# Patient Record
Sex: FEMALE | Race: OTHER | Employment: OTHER | URBAN - METROPOLITAN AREA
[De-identification: names, ages, dates, MRNs, and addresses within clinical notes are randomized per-mention and may not be internally consistent; named-entity substitution may affect disease eponyms.]

---

## 2017-02-14 ENCOUNTER — TELEPHONE (OUTPATIENT)
Dept: OBGYN | Age: 62
End: 2017-02-14

## 2017-02-14 RX ORDER — FLUCONAZOLE 150 MG/1
150 TABLET ORAL ONCE
Qty: 2 TABLET | Refills: 0 | Status: SHIPPED | OUTPATIENT
Start: 2017-02-14 | End: 2017-02-14

## 2017-03-20 ENCOUNTER — OFFICE VISIT (OUTPATIENT)
Dept: OBGYN | Age: 62
End: 2017-03-20

## 2017-03-20 VITALS
HEIGHT: 67 IN | WEIGHT: 243 LBS | BODY MASS INDEX: 38.14 KG/M2 | SYSTOLIC BLOOD PRESSURE: 130 MMHG | DIASTOLIC BLOOD PRESSURE: 86 MMHG

## 2017-03-20 DIAGNOSIS — N76.0 ACUTE VAGINITIS: ICD-10-CM

## 2017-03-20 DIAGNOSIS — N89.8 VAGINAL DISCHARGE: Primary | ICD-10-CM

## 2017-03-20 PROCEDURE — 1036F TOBACCO NON-USER: CPT | Performed by: OBSTETRICS & GYNECOLOGY

## 2017-03-20 PROCEDURE — G8484 FLU IMMUNIZE NO ADMIN: HCPCS | Performed by: OBSTETRICS & GYNECOLOGY

## 2017-03-20 PROCEDURE — 3014F SCREEN MAMMO DOC REV: CPT | Performed by: OBSTETRICS & GYNECOLOGY

## 2017-03-20 PROCEDURE — G8427 DOCREV CUR MEDS BY ELIG CLIN: HCPCS | Performed by: OBSTETRICS & GYNECOLOGY

## 2017-03-20 PROCEDURE — 3017F COLORECTAL CA SCREEN DOC REV: CPT | Performed by: OBSTETRICS & GYNECOLOGY

## 2017-03-20 PROCEDURE — G8417 CALC BMI ABV UP PARAM F/U: HCPCS | Performed by: OBSTETRICS & GYNECOLOGY

## 2017-03-20 PROCEDURE — 99214 OFFICE O/P EST MOD 30 MIN: CPT | Performed by: OBSTETRICS & GYNECOLOGY

## 2017-03-20 RX ORDER — NYSTATIN 100000 [USP'U]/G
POWDER TOPICAL
Qty: 60 G | Refills: 6 | Status: SHIPPED | OUTPATIENT
Start: 2017-03-20 | End: 2017-07-05

## 2017-03-20 RX ORDER — METFORMIN HYDROCHLORIDE 500 MG/1
TABLET, EXTENDED RELEASE ORAL
Refills: 6 | Status: ON HOLD | COMMUNITY
Start: 2017-02-28 | End: 2021-08-19

## 2017-04-17 ENCOUNTER — TELEPHONE (OUTPATIENT)
Dept: OBGYN | Age: 62
End: 2017-04-17

## 2017-04-21 ENCOUNTER — TELEPHONE (OUTPATIENT)
Dept: OBGYN | Age: 62
End: 2017-04-21

## 2017-04-24 ENCOUNTER — TELEPHONE (OUTPATIENT)
Dept: OBGYN | Age: 62
End: 2017-04-24

## 2017-04-24 RX ORDER — CLOBETASOL PROPIONATE 0.5 MG/G
OINTMENT TOPICAL
Qty: 1 TUBE | Refills: 2 | Status: SHIPPED | OUTPATIENT
Start: 2017-04-24 | End: 2017-07-17 | Stop reason: SDUPTHER

## 2017-04-28 ENCOUNTER — TELEPHONE (OUTPATIENT)
Dept: OBGYN | Age: 62
End: 2017-04-28

## 2017-07-05 ENCOUNTER — OFFICE VISIT (OUTPATIENT)
Dept: OBGYN | Age: 62
End: 2017-07-05

## 2017-07-05 VITALS
DIASTOLIC BLOOD PRESSURE: 80 MMHG | SYSTOLIC BLOOD PRESSURE: 132 MMHG | WEIGHT: 240 LBS | BODY MASS INDEX: 37.67 KG/M2 | HEIGHT: 67 IN

## 2017-07-05 DIAGNOSIS — R21 RASH/SKIN ERUPTION: Primary | ICD-10-CM

## 2017-07-05 DIAGNOSIS — B37.2 YEAST INFECTION OF THE SKIN: ICD-10-CM

## 2017-07-05 PROCEDURE — 99214 OFFICE O/P EST MOD 30 MIN: CPT | Performed by: OBSTETRICS & GYNECOLOGY

## 2017-07-05 PROCEDURE — G8417 CALC BMI ABV UP PARAM F/U: HCPCS | Performed by: OBSTETRICS & GYNECOLOGY

## 2017-07-05 PROCEDURE — 1036F TOBACCO NON-USER: CPT | Performed by: OBSTETRICS & GYNECOLOGY

## 2017-07-05 PROCEDURE — 3017F COLORECTAL CA SCREEN DOC REV: CPT | Performed by: OBSTETRICS & GYNECOLOGY

## 2017-07-05 PROCEDURE — G8427 DOCREV CUR MEDS BY ELIG CLIN: HCPCS | Performed by: OBSTETRICS & GYNECOLOGY

## 2017-07-05 PROCEDURE — 3014F SCREEN MAMMO DOC REV: CPT | Performed by: OBSTETRICS & GYNECOLOGY

## 2017-07-17 RX ORDER — CLOBETASOL PROPIONATE 0.5 MG/G
OINTMENT TOPICAL
Qty: 60 G | Refills: 6 | Status: ON HOLD | OUTPATIENT
Start: 2017-07-17 | End: 2021-08-19

## 2017-11-27 ENCOUNTER — TELEPHONE (OUTPATIENT)
Dept: OBGYN | Age: 62
End: 2017-11-27

## 2017-11-27 DIAGNOSIS — Z12.39 BREAST CANCER SCREENING: Primary | ICD-10-CM

## 2017-12-01 ENCOUNTER — HOSPITAL ENCOUNTER (OUTPATIENT)
Dept: WOMENS IMAGING | Age: 62
Discharge: HOME | End: 2017-12-01
Payer: COMMERCIAL

## 2017-12-01 DIAGNOSIS — Z12.39 BREAST CANCER SCREENING: ICD-10-CM

## 2017-12-01 PROCEDURE — G0202 SCR MAMMO BI INCL CAD: HCPCS

## 2018-08-15 ENCOUNTER — TELEPHONE (OUTPATIENT)
Dept: OBGYN CLINIC | Age: 63
End: 2018-08-15

## 2018-08-15 DIAGNOSIS — Z12.39 BREAST CANCER SCREENING: Primary | ICD-10-CM

## 2019-05-22 ENCOUNTER — TELEPHONE (OUTPATIENT)
Dept: OBGYN CLINIC | Age: 64
End: 2019-05-22

## 2019-05-22 DIAGNOSIS — Z12.39 BREAST CANCER SCREENING: Primary | ICD-10-CM

## 2019-05-22 NOTE — TELEPHONE ENCOUNTER
Pt is asking for a mammogram order to be put in her chart  She said she is 70 and was told she does not need paps any longer

## 2019-06-05 ENCOUNTER — HOSPITAL ENCOUNTER (OUTPATIENT)
Dept: WOMENS IMAGING | Age: 64
Discharge: HOME | End: 2019-06-07
Payer: COMMERCIAL

## 2019-06-05 DIAGNOSIS — Z12.39 BREAST CANCER SCREENING: ICD-10-CM

## 2019-06-05 PROCEDURE — 77067 SCR MAMMO BI INCL CAD: CPT

## 2021-03-18 DIAGNOSIS — Z01.818 PRE-OP TESTING: Primary | ICD-10-CM

## 2021-03-19 ENCOUNTER — HOSPITAL ENCOUNTER (OUTPATIENT)
Dept: WOMENS IMAGING | Age: 66
Discharge: HOME | End: 2021-03-21
Payer: MEDICARE

## 2021-03-19 VITALS — BODY MASS INDEX: 37.59 KG/M2 | HEIGHT: 67 IN

## 2021-03-19 DIAGNOSIS — Z12.31 ENCOUNTER FOR SCREENING MAMMOGRAM FOR BREAST CANCER: ICD-10-CM

## 2021-03-19 DIAGNOSIS — Z00.00 ROUTINE GENERAL MEDICAL EXAMINATION AT A HEALTH CARE FACILITY: ICD-10-CM

## 2021-03-19 PROCEDURE — 77067 SCR MAMMO BI INCL CAD: CPT

## 2021-03-31 ENCOUNTER — HOSPITAL ENCOUNTER (OUTPATIENT)
Dept: ULTRASOUND IMAGING | Age: 66
Discharge: HOME | End: 2021-04-02
Payer: MEDICARE

## 2021-03-31 ENCOUNTER — HOSPITAL ENCOUNTER (OUTPATIENT)
Dept: WOMENS IMAGING | Age: 66
Discharge: HOME | End: 2021-04-02
Payer: MEDICARE

## 2021-03-31 DIAGNOSIS — R92.8 ABNORMAL MAMMOGRAM OF BOTH BREASTS: ICD-10-CM

## 2021-03-31 PROCEDURE — G0279 TOMOSYNTHESIS, MAMMO: HCPCS

## 2021-03-31 PROCEDURE — 76642 ULTRASOUND BREAST LIMITED: CPT

## 2021-08-02 RX ORDER — POLYETHYLENE GLYCOL 3350, SODIUM CHLORIDE, SODIUM BICARBONATE, POTASSIUM CHLORIDE 420; 11.2; 5.72; 1.48 G/4L; G/4L; G/4L; G/4L
4000 POWDER, FOR SOLUTION ORAL ONCE
Qty: 4000 ML | Refills: 0 | Status: SHIPPED | OUTPATIENT
Start: 2021-08-02 | End: 2021-08-02

## 2021-08-13 ENCOUNTER — NURSE ONLY (OUTPATIENT)
Dept: GASTROENTEROLOGY | Age: 66
End: 2021-08-13

## 2021-08-13 DIAGNOSIS — Z01.818 PREOP TESTING: Primary | ICD-10-CM

## 2021-08-13 NOTE — PROGRESS NOTES
2021    Criselda Fuller (:  1955) is a 77 y.o. female, here requesting COVID-19 testing    History of Present Illness  none    There were no vitals filed for this visit. ASSESSMENT  Screening for COVID-19/ Viral disease    PLAN  POCT influenza testing Not Tested Here for preop testing for gastro procedure   COVID-19 sample collected and submitted  Patient given detailed CDC instructions contained within After Visit Summary       An  electronic signature was used to authenticate this note.     --Svetlana Norman on 2021 at 10:14 AM

## 2021-08-19 ENCOUNTER — ANCILLARY PROCEDURE (OUTPATIENT)
Dept: ENDOSCOPY | Age: 66
End: 2021-08-19
Attending: SPECIALIST
Payer: MEDICARE

## 2021-08-19 ENCOUNTER — HOSPITAL ENCOUNTER (OUTPATIENT)
Age: 66
Setting detail: OUTPATIENT SURGERY
Discharge: HOME | End: 2021-08-19
Attending: SPECIALIST | Admitting: SPECIALIST
Payer: MEDICARE

## 2021-08-19 ENCOUNTER — ANESTHESIA (OUTPATIENT)
Dept: ENDOSCOPY | Age: 66
End: 2021-08-19
Payer: MEDICARE

## 2021-08-19 ENCOUNTER — ANESTHESIA EVENT (OUTPATIENT)
Dept: ENDOSCOPY | Age: 66
End: 2021-08-19
Payer: MEDICARE

## 2021-08-19 VITALS
RESPIRATION RATE: 16 BRPM | HEIGHT: 67 IN | TEMPERATURE: 97.5 F | DIASTOLIC BLOOD PRESSURE: 59 MMHG | BODY MASS INDEX: 37.35 KG/M2 | SYSTOLIC BLOOD PRESSURE: 123 MMHG | HEART RATE: 69 BPM | WEIGHT: 238 LBS | OXYGEN SATURATION: 98 %

## 2021-08-19 VITALS
SYSTOLIC BLOOD PRESSURE: 108 MMHG | RESPIRATION RATE: 9 BRPM | DIASTOLIC BLOOD PRESSURE: 54 MMHG | OXYGEN SATURATION: 100 %

## 2021-08-19 LAB
GLUCOSE BLD-MCNC: 135 MG/DL (ref 60–115)
PERFORMED ON: ABNORMAL

## 2021-08-19 PROCEDURE — 2580000003 HC RX 258: Performed by: NURSE ANESTHETIST, CERTIFIED REGISTERED

## 2021-08-19 PROCEDURE — 3609027000 HC COLONOSCOPY: Performed by: SPECIALIST

## 2021-08-19 PROCEDURE — 2709999900 HC NON-CHARGEABLE SUPPLY: Performed by: SPECIALIST

## 2021-08-19 PROCEDURE — 6370000000 HC RX 637 (ALT 250 FOR IP): Performed by: SPECIALIST

## 2021-08-19 PROCEDURE — 45385 COLONOSCOPY W/LESION REMOVAL: CPT | Performed by: SPECIALIST

## 2021-08-19 PROCEDURE — 7100000010 HC PHASE II RECOVERY - FIRST 15 MIN: Performed by: SPECIALIST

## 2021-08-19 PROCEDURE — 2580000003 HC RX 258: Performed by: SPECIALIST

## 2021-08-19 PROCEDURE — 45380 COLONOSCOPY AND BIOPSY: CPT | Performed by: SPECIALIST

## 2021-08-19 PROCEDURE — 3700000000 HC ANESTHESIA ATTENDED CARE: Performed by: SPECIALIST

## 2021-08-19 PROCEDURE — 3700000001 HC ADD 15 MINUTES (ANESTHESIA): Performed by: SPECIALIST

## 2021-08-19 PROCEDURE — 88305 TISSUE EXAM BY PATHOLOGIST: CPT

## 2021-08-19 PROCEDURE — 2500000003 HC RX 250 WO HCPCS: Performed by: NURSE ANESTHETIST, CERTIFIED REGISTERED

## 2021-08-19 RX ORDER — MAGNESIUM HYDROXIDE 1200 MG/15ML
LIQUID ORAL PRN
Status: DISCONTINUED | OUTPATIENT
Start: 2021-08-19 | End: 2021-08-19 | Stop reason: ALTCHOICE

## 2021-08-19 RX ORDER — UBIDECARENONE 75 MG
50 CAPSULE ORAL DAILY
COMMUNITY

## 2021-08-19 RX ORDER — PROPOFOL 10 MG/ML
INJECTION, EMULSION INTRAVENOUS CONTINUOUS PRN
Status: DISCONTINUED | OUTPATIENT
Start: 2021-08-19 | End: 2021-08-19 | Stop reason: SDUPTHER

## 2021-08-19 RX ORDER — SODIUM CHLORIDE 9 MG/ML
INJECTION, SOLUTION INTRAVENOUS
Status: COMPLETED
Start: 2021-08-19 | End: 2021-08-19

## 2021-08-19 RX ORDER — SODIUM CHLORIDE 9 MG/ML
INJECTION, SOLUTION INTRAVENOUS CONTINUOUS PRN
Status: DISCONTINUED | OUTPATIENT
Start: 2021-08-19 | End: 2021-08-19 | Stop reason: SDUPTHER

## 2021-08-19 RX ORDER — ONDANSETRON 2 MG/ML
4 INJECTION INTRAMUSCULAR; INTRAVENOUS
Status: DISCONTINUED | OUTPATIENT
Start: 2021-08-19 | End: 2021-08-19 | Stop reason: HOSPADM

## 2021-08-19 RX ORDER — 0.9 % SODIUM CHLORIDE 0.9 %
500 INTRAVENOUS SOLUTION INTRAVENOUS ONCE
Status: COMPLETED | OUTPATIENT
Start: 2021-08-19 | End: 2021-08-19

## 2021-08-19 RX ORDER — LIDOCAINE HYDROCHLORIDE 20 MG/ML
INJECTION, SOLUTION INFILTRATION; PERINEURAL PRN
Status: DISCONTINUED | OUTPATIENT
Start: 2021-08-19 | End: 2021-08-19 | Stop reason: SDUPTHER

## 2021-08-19 RX ADMIN — PROPOFOL 140 MCG/KG/MIN: 10 INJECTION, EMULSION INTRAVENOUS at 11:38

## 2021-08-19 RX ADMIN — SODIUM CHLORIDE 500 ML: 9 INJECTION, SOLUTION INTRAVENOUS at 09:29

## 2021-08-19 RX ADMIN — SODIUM CHLORIDE: 9 INJECTION, SOLUTION INTRAVENOUS at 11:31

## 2021-08-19 RX ADMIN — LIDOCAINE HYDROCHLORIDE 40 MG: 20 INJECTION, SOLUTION INFILTRATION; PERINEURAL at 11:38

## 2021-08-19 ASSESSMENT — PAIN SCALES - GENERAL
PAINLEVEL_OUTOF10: 0
PAINLEVEL_OUTOF10: 0

## 2021-08-19 NOTE — H&P
Patient Name: Criselda Fuller  : 1955  MRN: [id number]  DATE: 21      ENDOSCOPY  History and Physical    Procedure:    [x] Diagnostic Colonoscopy       [] Screening Colonoscopy  [] EGD      [] ERCP      [] EUS       [] Other    [x] Previous office notes/History and Physical reviewed from the patients chart. Please see EMR for further details of HPI. I have examined the patient's status immediately prior to the procedure and:      Indications/HPI:    []Abdominal Pain  []Cancer- GI/Lung  []Fhx of colon CA/polyps  []History of Polyps  []Hernandezs   []Melena  []Abnormal Imaging  []Dysphagia    []Persistent Pneumonia  []Anemia  []Food Impaction  [x]History of Polyps  []GI Bleed  []Pulmonary nodule/Mass  []Change in bowel habits []Heartburn/Reflux  []Rectal Bleed (BRBPR)  []Chest Pain - Non Cardiac []Heme (+) Stoo  l[]Ulcers  []Constipation  []Hemoptysis   []Varices  []Diarrhea  []Hypoxemia  []Nausea/Vomiting  []Screening   []Crohns/Colitis  []Other:     Anesthesia:   [x] MAC [] Moderate Sedation   [] General   [] None     ROS: 12 pt Review of Symptoms was negative unless mentioned above    Medications:   Prior to Admission medications    Medication Sig Start Date End Date Taking? Authorizing Provider   vitamin B-12 (CYANOCOBALAMIN) 100 MCG tablet Take 50 mcg by mouth daily   Yes Historical Provider, MD   vitamin D (CHOLECALCIFEROL) 1000 UNIT TABS tablet Take 1,000 Int'l Units by mouth daily. Yes Historical Provider, MD       Allergies:    Allergies   Allergen Reactions    Amoxicillin     Amoxicillin-Pot Clavulanate     Aspirin     Cephalexin     Codeine     Darvocet [Propoxyphene N-Acetaminophen]     Helidac [Metronid-Tetracyc-Bis Subsal]     Meclizine     Metoclopramide     Nizatidine     Trimox [Amoxicillin Trihydrate]     Zithromax [Azithromycin Dihydrate]         History of allergic reaction to anesthesia:  No    Past Medical History:  Past Medical History:   Diagnosis Date    [facility] (acromioclavicular) joint bone spurs     spine, R ankle    Arthritis     Bulging lumbar disc     Colon polyp     Diabetes mellitus (HCC)     Disc degeneration, lumbar     DJD (degenerative joint disease)     knees    Esophageal polyp 2011    Fibromyalgia     Megaloblastic anemia due to decreased intake of vitamin B12     Ovarian cyst, right     Tendonitis     R ankle    Vitamin D deficiency        Past Surgical History:  Past Surgical History:   Procedure Laterality Date    ABSCESS DRAINAGE      rectum    COLONOSCOPY  8/1/14,8/09    polypectomy tracy    DILATION AND CURETTAGE      EYE SURGERY      EYE SURGERY      FULGURATION MINOR BLADDER LESION (W OR W/O BIOPSY)      FULGURATION MINOR BLADDER LESION (W OR W/O BIOPSY)      UPPER GASTROINTESTINAL ENDOSCOPY  11/11,    bx polypectomy geovannysmick       Social History:  Social History     Tobacco Use    Smoking status: Former Smoker     Types: Cigarettes    Smokeless tobacco: Never Used   Vaping Use    Vaping Use: Never used   Substance Use Topics    Alcohol use: No     Alcohol/week: 0.0 standard drinks    Drug use: No       Vital Signs:   Vitals:    08/19/21 0928   BP: (!) 203/89   Pulse: 79   Resp: 18   Temp: 97.5 °F (36.4 °C)   SpO2: 99%        Physical Exam:  Cardiac:  [x]WNL  []Comments:  Pulmonary:  [x]WNL   []Comments:   Neuro/Mental Status:  [x]WNL  []Comments:  Abdominal:  [x]WNL    []Comments:  Other:   []WNL  []Comments:    Informed Consent:  The risks and benefits of the procedure have been discussed with either the patient or if they cannot consent, their representative. Assessment:  Patient examined and appropriate for planned sedation and procedure. Plan:  Proceed with planned sedation and procedure as above.     Vonda Randle MD  11:31 AM

## 2021-08-19 NOTE — ANESTHESIA POSTPROCEDURE EVALUATION
Department of Anesthesiology  Postprocedure Note    Patient: Yvonne Rhodes  MRN: [id number]  YOB: 1955  Date of evaluation: 8/19/2021  Time:  12:06 PM     Procedure Summary     Date: 08/19/21 Room / Location: [facility] OR 01 / [facility]    Anesthesia Start: 1134 Anesthesia Stop:     Procedure: COLORECTAL CANCER SCREENING, HIGH RISK (N/A ) Diagnosis: (History of colon polyps Z86.010)    Surgeons: Mona Vigil MD Responsible Provider: ALEX Dickinson CRNA    Anesthesia Type: MAC ASA Status: 2          Anesthesia Type: No value filed. Vanessa Phase I: Vanessa Score: 10    Vanessa Phase II:      Last vitals: Reviewed and per EMR flowsheets.        Anesthesia Post Evaluation    Patient location during evaluation: PACU  Patient participation: complete - patient participated  Level of consciousness: awake and alert  Pain score: 0  Airway patency: patent  Nausea & Vomiting: no nausea and no vomiting  Complications: no  Cardiovascular status: blood pressure returned to baseline and hemodynamically stable  Respiratory status: acceptable  Hydration status: euvolemic

## 2021-08-19 NOTE — ANESTHESIA PRE PROCEDURE
MINOR BLADDER LESION (W OR W/O BIOPSY)      UPPER GASTROINTESTINAL ENDOSCOPY  11/11,    bx polypectomy tracy       Social History:    Social History     Tobacco Use    Smoking status: Former Smoker     Types: Cigarettes    Smokeless tobacco: Never Used   Substance Use Topics    Alcohol use: No     Alcohol/week: 0.0 standard drinks                                Counseling given: Not Answered      Vital Signs (Current):   Vitals:    08/19/21 0928   BP: (!) 203/89   Pulse: 79   Resp: 18   Temp: 36.4 °C (97.5 °F)   TempSrc: Temporal   SpO2: 99%   Weight: 238 lb (108 kg)   Height: 5' 7\" (1.702 m)                                              BP Readings from Last 3 Encounters:   08/19/21 (!) 203/89   07/05/17 132/80   03/20/17 130/86       NPO Status: Time of last liquid consumption: 0515                        Time of last solid consumption: 1700                        Date of last liquid consumption: 08/19/21                        Date of last solid food consumption: 08/17/21    BMI:   Wt Readings from Last 3 Encounters:   08/19/21 238 lb (108 kg)   07/05/17 240 lb (108.9 kg)   03/20/17 243 lb (110.2 kg)     Body mass index is 37.28 kg/m².     CBC: No results found for: WBC, RBC, HGB, HCT, MCV, RDW, PLT    CMP: No results found for: NA, K, CL, CO2, BUN, CREATININE, GFRAA, AGRATIO, LABGLOM, GLUCOSE, PROT, CALCIUM, BILITOT, ALKPHOS, AST, ALT    POC Tests:   Recent Labs     08/19/21  0932   POCGLU 135*       Coags: No results found for: PROTIME, INR, APTT    HCG (If Applicable): No results found for: PREGTESTUR, PREGSERUM, HCG, HCGQUANT     ABGs: No results found for: PHART, PO2ART, SJW5QDS, AMV7ZDB, BEART, L1FJSCEV     Type & Screen (If Applicable):  No results found for: LABABO, LABRH    Drug/Infectious Status (If Applicable):  No results found for: HIV, HEPCAB    COVID-19 Screening (If Applicable):   Lab Results   Component Value Date    COVID19 Not Detected 08/13/2021           Anesthesia Evaluation  Patient summary reviewed and Nursing notes reviewed  Airway: Mallampati: II  TM distance: >3 FB   Neck ROM: full  Mouth opening: > = 3 FB Dental: normal exam         Pulmonary:Negative Pulmonary ROS and normal exam                               Cardiovascular:Negative CV ROS                      Neuro/Psych:   (+) neuromuscular disease:,             GI/Hepatic/Renal: Neg GI/Hepatic/Renal ROS            Endo/Other:    (+) Diabetes, . Abdominal:             Vascular: Other Findings:             Anesthesia Plan      MAC     ASA 2             Anesthetic plan and risks discussed with patient. Plan discussed with CRNA.                   ALEX Dougherty - CRNA   8/19/2021

## 2022-08-11 ENCOUNTER — HOSPITAL ENCOUNTER (OUTPATIENT)
Dept: ULTRASOUND IMAGING | Age: 67
Discharge: HOME | End: 2022-08-13
Payer: MEDICARE

## 2022-08-11 ENCOUNTER — HOSPITAL ENCOUNTER (OUTPATIENT)
Dept: WOMENS IMAGING | Age: 67
Discharge: HOME | End: 2022-08-13
Payer: MEDICARE

## 2022-08-11 VITALS — HEIGHT: 67 IN | BODY MASS INDEX: 37.28 KG/M2

## 2022-08-11 DIAGNOSIS — R92.8 ABNORMAL MAMMOGRAM: ICD-10-CM

## 2022-08-11 PROCEDURE — 76642 ULTRASOUND BREAST LIMITED: CPT

## 2022-08-11 PROCEDURE — G0279 TOMOSYNTHESIS, MAMMO: HCPCS

## 2023-02-03 ENCOUNTER — HOSPITAL ENCOUNTER (OUTPATIENT)
Dept: WOMENS IMAGING | Age: 68
End: 2023-02-03
Payer: MEDICARE

## 2023-02-03 ENCOUNTER — HOSPITAL ENCOUNTER (OUTPATIENT)
Dept: ULTRASOUND IMAGING | Age: 68
End: 2023-02-03
Payer: MEDICARE

## 2023-02-03 VITALS — BODY MASS INDEX: 37.28 KG/M2 | HEIGHT: 67 IN

## 2023-02-03 DIAGNOSIS — R92.8 ABNORMAL MAMMOGRAM: ICD-10-CM

## 2023-02-03 PROCEDURE — 76642 ULTRASOUND BREAST LIMITED: CPT

## 2023-02-03 PROCEDURE — G0279 TOMOSYNTHESIS, MAMMO: HCPCS

## 2023-02-13 ENCOUNTER — OFFICE VISIT (OUTPATIENT)
Dept: SURGERY | Age: 68
End: 2023-02-13
Payer: MEDICARE

## 2023-02-13 VITALS
SYSTOLIC BLOOD PRESSURE: 148 MMHG | TEMPERATURE: 97 F | HEART RATE: 95 BPM | OXYGEN SATURATION: 99 % | DIASTOLIC BLOOD PRESSURE: 72 MMHG | WEIGHT: 244 LBS | RESPIRATION RATE: 16 BRPM | BODY MASS INDEX: 38.3 KG/M2 | HEIGHT: 67 IN

## 2023-02-13 DIAGNOSIS — E66.9 OBESITY, CLASS II, BMI 35-39.9: ICD-10-CM

## 2023-02-13 DIAGNOSIS — R92.8 ABNORMAL MAMMOGRAM OF RIGHT BREAST: Primary | ICD-10-CM

## 2023-02-13 DIAGNOSIS — N63.11 MASS OF UPPER OUTER QUADRANT OF RIGHT BREAST: ICD-10-CM

## 2023-02-13 PROBLEM — J30.9 ALLERGIC RHINITIS: Status: ACTIVE | Noted: 2023-02-13

## 2023-02-13 PROBLEM — R73.9 HYPERGLYCEMIA: Status: ACTIVE | Noted: 2023-02-13

## 2023-02-13 PROBLEM — B35.4 TINEA CORPORIS: Status: ACTIVE | Noted: 2023-02-13

## 2023-02-13 PROBLEM — K80.50 BILIARY COLIC: Status: ACTIVE | Noted: 2023-02-13

## 2023-02-13 PROBLEM — E53.8 COBALAMIN DEFICIENCY: Status: ACTIVE | Noted: 2023-02-13

## 2023-02-13 PROBLEM — M79.609 PAIN IN LIMB: Status: ACTIVE | Noted: 2017-03-03

## 2023-02-13 PROBLEM — E55.9 VITAMIN D DEFICIENCY: Status: ACTIVE | Noted: 2023-02-13

## 2023-02-13 PROBLEM — F34.1 PRIMARY DYSTHYMIA: Status: ACTIVE | Noted: 2023-02-13
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
## 2023-02-13 PROBLEM — S46.919A STRAIN OF SHOULDER: Status: ACTIVE | Noted: 2023-02-13

## 2023-02-13 PROBLEM — L20.9 ATOPIC DERMATITIS: Status: ACTIVE | Noted: 2023-02-13

## 2023-02-13 PROBLEM — S69.90XA INJURY OF WRIST: Status: ACTIVE | Noted: 2023-02-13

## 2023-02-13 PROBLEM — M77.30 CALCANEAL SPUR: Status: ACTIVE | Noted: 2017-03-03

## 2023-02-13 PROBLEM — M19.079 PRIMARY LOCALIZED OSTEOARTHROSIS OF ANKLE AND FOOT: Status: ACTIVE | Noted: 2017-03-03

## 2023-02-13 PROBLEM — E11.9 TYPE 2 DIABETES MELLITUS (HCC): Status: ACTIVE | Noted: 2023-02-13

## 2023-02-13 PROBLEM — M85.80 OSTEOPENIA: Status: ACTIVE | Noted: 2023-02-13

## 2023-02-13 PROBLEM — H25.813 COMBINED FORMS OF AGE-RELATED CATARACT OF BOTH EYES: Status: ACTIVE | Noted: 2021-05-12

## 2023-02-13 PROBLEM — E78.5 HYPERLIPIDEMIA: Status: ACTIVE | Noted: 2023-02-13

## 2023-02-13 PROBLEM — R53.1 WEAKNESS: Status: ACTIVE | Noted: 2023-02-13

## 2023-02-13 PROBLEM — E66.812 OBESITY, CLASS II, BMI 35-39.9: Status: ACTIVE | Noted: 2023-02-13

## 2023-02-13 PROBLEM — S00.83XA CONTUSION OF FACE: Status: ACTIVE | Noted: 2023-02-13

## 2023-02-13 PROCEDURE — 19083 BX BREAST 1ST LESION US IMAG: CPT | Performed by: SURGERY

## 2023-02-13 RX ORDER — COVID-19 ANTIGEN TEST
1 KIT MISCELLANEOUS EVERY 12 HOURS PRN
COMMUNITY
Start: 2021-07-22

## 2023-02-13 RX ORDER — PRAVASTATIN SODIUM 10 MG
1 TABLET ORAL DAILY
COMMUNITY
Start: 2022-12-02

## 2023-02-13 RX ORDER — CLOBETASOL PROPIONATE 0.5 MG/G
1 CREAM TOPICAL DAILY PRN
COMMUNITY
Start: 2019-10-28

## 2023-02-13 RX ORDER — MECLIZINE HCL 12.5 MG/1
25 TABLET ORAL 3 TIMES DAILY PRN
COMMUNITY

## 2023-02-13 RX ORDER — CETIRIZINE HYDROCHLORIDE 10 MG/1
1 TABLET ORAL DAILY PRN
COMMUNITY
Start: 2020-06-24

## 2023-02-13 RX ORDER — LIDOCAINE HYDROCHLORIDE AND EPINEPHRINE 15; 5 MG/ML; UG/ML
8 INJECTION, SOLUTION EPIDURAL ONCE
Status: COMPLETED | OUTPATIENT
Start: 2023-02-13 | End: 2023-02-13

## 2023-02-13 RX ADMIN — LIDOCAINE HYDROCHLORIDE AND EPINEPHRINE 8 ML: 15; 5 INJECTION, SOLUTION EPIDURAL at 10:52

## 2023-02-13 RX ADMIN — Medication 2 MEQ: at 10:52

## 2023-02-13 ASSESSMENT — ENCOUNTER SYMPTOMS
CHEST TIGHTNESS: 0
VOMITING: 0
SORE THROAT: 0
SHORTNESS OF BREATH: 0
ABDOMINAL PAIN: 0
COLOR CHANGE: 0
COUGH: 0
NAUSEA: 0

## 2023-02-13 NOTE — PROGRESS NOTES
NEW BREAST PATIENT         SERVICE DATE: 2/13/23  SERVICE TIME:  10:17 AM EST    REFERRED BY:  Reyes Salts, MD    REASON FOR TODAY'S VISIT:    Chief Complaint   Patient presents with    New Patient    Abnormal Mammogram    Abnormal Ultrasound     BIRADS 4 right breast,does self breast exams and cannot feel any changes, denies breast pain, nipple drainage or kin changes bilateral breasts      CHAPERONE WAS OFFERED, PATIENT RESPONDED: no    HISTORY AND CHIEF COMPLAINT:  Deya Fong is a 69 y.o.  female who is here for a New Patient, Abnormal Mammogram, and Abnormal Ultrasound (BIRADS 4 right breast,does self breast exams and cannot feel any changes, denies breast pain, nipple drainage or kin changes bilateral breasts)  She gets regular mammograms, and looking at this area for some time      BREAST HISTORY  Her past breast history (prior to this encounter) is as follows: Abnormal mammogram:   Yes, Right Breast BIRADS 4  Abnormal Breast US:  Yes, Right Breast BIRADS 4  Breast biopsy:    No  Breast cysts:    No  Breast surgery:    No  Breast cancer              No  History of Breastfeeding: No   Currently Breastfeeding: No      RISK FACTORS FOR BREAST CANCER:  Family History of Breast Cancer: Yes, significant for Maternal Grandmother age of DX unknown .   History of ovarian cancer: no  Ashkenazi Ancestry: no  Age at the birth of first child:  2 miscarriages  Age at the onset of menses: 13  Age at menopause: 52   Hormonal therapy: no  Postmenopausal obesity: yes, BMI 38.22    BRA SIZE: 48B    Past Medical History:   Diagnosis Date    AC (acromioclavicular) joint bone spurs     spine, R ankle    Adenomatous polyp of colon     Arthritis     Bulging lumbar disc     Colon polyp     Diabetes mellitus (HCC)     Disc degeneration, lumbar     DJD (degenerative joint disease)     knees    Esophageal polyp 2011    Fibromyalgia     Megaloblastic anemia due to decreased intake of vitamin B12     Ovarian cyst, right     Tendonitis     R ankle    Vitamin D deficiency      Past Surgical History:   Procedure Laterality Date    ABSCESS DRAINAGE      rectum    COLONOSCOPY  8/1/14,8/09    polypectomy tracy    COLONOSCOPY N/A 8/19/2021    COLORECTAL CANCER SCREENING, HIGH RISK with polypectomies  performed by Karyn Lopes MD at [address]       FULGUNORRIS MINOR BLADDER LESION (W OR W/O BIOPSY)      FULGURATION MINOR BLADDER LESION (W OR W/O BIOPSY)      UPPER GASTROINTESTINAL ENDOSCOPY  11/11,    bx polypectomy tracy     Family History   Problem Relation Age of Onset    Cancer Maternal Grandmother         uterine cancer, breast cancer, lung cancer    Breast Cancer Maternal Grandmother     Dementia Father      Social History     Socioeconomic History    Marital status:      Spouse name: Not on file    Number of children: Not on file    Years of education: Not on file    Highest education level: Not on file   Occupational History    Occupation: [employer]    Tobacco Use    Smoking status: Former     Types: Cigarettes    Smokeless tobacco: Never   Vaping Use    Vaping Use: Never used   Substance and Sexual Activity    Alcohol use: Not Currently    Drug use: No    Sexual activity: Not Currently   Other Topics Concern    Not on file   Social History Narrative    Not on file     Social Determinants of Health     Financial Resource Strain: Not on file   Food Insecurity: Not on file   Transportation Needs: Not on file   Physical Activity: Not on file   Stress: Not on file   Social Connections: Not on file   Intimate Partner Violence: Not on file   Housing Stability: Not on file       Review of Systems   Constitutional:  Negative for activity change, appetite change, chills, diaphoresis, fatigue, fever and unexpected weight change. HENT:  Negative for congestion, ear pain, hearing loss, mouth sores, nosebleeds and sore throat.     Respiratory: Negative for cough, chest tightness and shortness of breath. Cardiovascular:  Negative for chest pain, palpitations and leg swelling. Gastrointestinal:  Negative for abdominal pain, nausea and vomiting. Endocrine: Negative for cold intolerance, heat intolerance, polydipsia, polyphagia and polyuria. Genitourinary:  Negative for difficulty urinating, menstrual problem and vaginal bleeding. Musculoskeletal:  Negative for neck pain and neck stiffness. Skin:  Negative for color change, pallor, rash and wound. Allergic/Immunologic: Negative for environmental allergies and immunocompromised state. Neurological:  Negative for weakness. Hematological:  Does not bruise/bleed easily. Psychiatric/Behavioral:  Negative for agitation, confusion, sleep disturbance and suicidal ideas. The patient is not nervous/anxious. Have you ever tested positive for AIDS? no  Have you ever tested positive for Hepatitis? no    ANTICOAGULANT MEDICATIONS:  ASA    SOCIAL HISTORY   Marital status:   Occupation:  retired   Tobacco use: Emiliano Garza  reports that she has quit smoking. Her smoking use included cigarettes. She has never used smokeless tobacco.  Alcohol use: Emiliano Garza  reports that she does not currently use alcohol. Drug use: Emiliano Garza  reports no history of drug use. Caffeine intake: 0 cups of caffeinated coffee per day(s)  Exercise:  not active    Vitals:    02/13/23 1005 02/13/23 1017   BP: (!) 148/74 (!) 148/72   Pulse: 95    Resp: 16    Temp: 97 °F (36.1 °C)    SpO2: 99%    Weight: 244 lb (110.7 kg)    Height: 5' 7\" (1.702 m)         Physical Exam  Vitals reviewed. Constitutional:       Appearance: Normal appearance. She is well-developed. HENT:      Head: Normocephalic and atraumatic. Nose: Nose normal.   Eyes:      Conjunctiva/sclera: Conjunctivae normal.      Right eye: No hemorrhage. Left eye: No hemorrhage. Cardiovascular:      Rate and Rhythm: Normal rate.    Pulmonary: Effort: Pulmonary effort is normal. No respiratory distress. Chest:   Breasts:     Breasts are symmetrical.      Right: Mass (1.5 cm mobile smooth mass) present. No inverted nipple, nipple discharge, skin change or tenderness. Left: No inverted nipple, mass, nipple discharge, skin change or tenderness. Musculoskeletal:         General: Normal range of motion. Cervical back: Normal range of motion and neck supple. Comments: Normal Range of motion in upper and lower extremities. Lymphadenopathy:      Cervical: No cervical adenopathy. Right cervical: No superficial, deep or posterior cervical adenopathy. Left cervical: No superficial, deep or posterior cervical adenopathy. Upper Body:      Right upper body: No supraclavicular, axillary or pectoral adenopathy. Left upper body: No supraclavicular, axillary or pectoral adenopathy. Skin:     General: Skin is warm and dry. Findings: No abrasion, bruising, erythema or lesion. Neurological:      Mental Status: She is alert and oriented to person, place, and time. She is not disoriented. Psychiatric:         Speech: Speech normal.         Behavior: Behavior normal. Behavior is cooperative. Thought Content: Thought content normal.         Judgment: Judgment normal.     RADIOGRAPHIC FINDINGS:    US BREAST LIMITED RIGHT    Result Date: 2/3/2023  EXAMINATION: DIAGNOSTIC DIGITAL RIGHT BREAST MAMMOGRAM WITH TOMOSYNTHESIS; TARGETED ULTRASOUND OF THE RIGHT BREAST 2/3/2023 9:10 am; 2/3/2023 9:59 am TECHNIQUE: Diagnostic mammography of the right breast was performed with tomosynthesis. 2D standard and 3D tomosynthesis combination imaging performed through the right breast.  Computer aided detection was utilized in the interpretation of this exam.; Targeted ultrasound of the right breast was performed.  COMPARISON: Multiple, most recent 2022 HISTORY: 20-year-old asymptomatic female presenting for follow-up of right breast probably benign mass. FINDINGS: Mammogram: Density: The breasts are almost entirely fatty. The mass within the right outer breast is more prominent compared to prior examination. Additionally, the margins do not appear to be circumscribed. This was further evaluated with right breast ultrasound. Ultrasound: There is a round non circumscribed hypoechoic mass in the right breast 9 o'clock 4 cm from the nipple that measures 0.5 x 0.5 x 0.4 cm. Follow-up is recommended with ultrasound-guided biopsy. This was previously labeled at the 7 o'clock axis, but further images were acquired in the 7 o'clock axis and there is no mass identified in that area. There is no right axillary lymphadenopathy. There is a suspicious mass in the right breast.  Follow-up is recommended with ultrasound-guided biopsy peer BIRADS: BIRADS - CATEGORY 4 Suspicious Abnormality. Biopsy should be considered at this time. OVERALL ASSESSMENT - SUSPICIOUS A letter of notification will be sent to the patient regarding the results. My findings and recommendations were discussed with the patient at the time of service. A representative from the radiology department will be contacting your office and assisting the patient in getting appropriate follow-up. [facility] NEIL DIGITAL DIAGNOSTIC UNILATERAL RIGHT    Result Date: 2/3/2023  EXAMINATION: DIAGNOSTIC DIGITAL RIGHT BREAST MAMMOGRAM WITH TOMOSYNTHESIS; TARGETED ULTRASOUND OF THE RIGHT BREAST 2/3/2023 9:10 am; 2/3/2023 9:59 am TECHNIQUE: Diagnostic mammography of the right breast was performed with tomosynthesis. 2D standard and 3D tomosynthesis combination imaging performed through the right breast.  Computer aided detection was utilized in the interpretation of this exam.; Targeted ultrasound of the right breast was performed. COMPARISON: Multiple, most recent 2022 HISTORY: 75-year-old asymptomatic female presenting for follow-up of right breast probably benign mass. FINDINGS: Mammogram: Density:  The breasts are almost entirely fatty. The mass within the right outer breast is more prominent compared to prior examination. Additionally, the margins do not appear to be circumscribed. This was further evaluated with right breast ultrasound. Ultrasound: There is a round non circumscribed hypoechoic mass in the right breast 9 o'clock 4 cm from the nipple that measures 0.5 x 0.5 x 0.4 cm. Follow-up is recommended with ultrasound-guided biopsy. This was previously labeled at the 7 o'clock axis, but further images were acquired in the 7 o'clock axis and there is no mass identified in that area. There is no right axillary lymphadenopathy. There is a suspicious mass in the right breast.  Follow-up is recommended with ultrasound-guided biopsy peer BIRADS: BIRADS - CATEGORY 4 Suspicious Abnormality. Biopsy should be considered at this time. OVERALL ASSESSMENT - SUSPICIOUS A letter of notification will be sent to the patient regarding the results. My findings and recommendations were discussed with the patient at the time of service. A representative from the radiology department will be contacting your office and assisting the patient in getting appropriate follow-up. ASSESSMENT         IMPRESSION :      ICD-10-CM    1. Abnormal mammogram of right breast  R92.8       2. Mass of upper outer quadrant of right breast  N63.11       3. Obesity, Class II, BMI 35-39.9  E66.9            PLAN:    We discussed the clinical exam and the imaging findings. I recommended an ultrasound guided core needle biopsy and possible clip placement. She was told that 95% of the time we can obtain a diagnosis. If the results are non-diagnostic she may require a more extensive biopsy. If more tissue is needed for diagnosis, the lesion might require removal of the lesion in the Operating Room.   The patient agreed to undergo the ultrasound guided fine needle aspiration and biopsy in the office setting understanding the risks of bleeding and infection. See ultrasound report. Ambulatory Procedure Time Out 2023   Correct Patient Yes   Correct Procedure Yes   Correct Site/Side Yes   Correct Site(s) Marked Yes   Informed Consent Signed Yes   Allergies Verified Yes   Staff Present & Credential: bakari gill rn        Ultrasound Guided Right Breast Core Needle Biopsy and Clip Placement    PATIENT:  Estefany Almanza     DATE: 2023    :      1955     INDICATION:  Right Mammogram Abnormality, Ultrasound Abnormality, Breast Mass, and Palpable area of Concern    LOCATION:   9 o'clock 4 cm from nipple    DESCRIPTION:    A timeout was performed immediately prior to the start of the Ultrasound Guided Right Breast Core Needle Biopsy and Clip Placement procedure and included the correct patient (two identifiers), correct procedure and correct site(s). Procedure consent and allergies were also verified. The patient understood the risks and benefits of the procedure and consented on the day of her visit. The ultrasound probe was placed over the suspicious lesion. The skin was prepped with chlorhexidine. 13 cc of lidocaine with epi/bicarb mixture was used to anesthetize the skin and breast.  An 11 scalpel was used to make a small quarter inch incision. 2 14-gauge Achieve core needle biopsies were performed, laterally to medially. The samples were placed in formalin. A clip was placed under ultrasound guidance. Hemostasis was obtained. The wound was cleaned. Steri-strips applied. Five minutes of pressure was held. The patient tolerated the procedure well.        IMPRESSION:  Successful Ultrasound Guided Right Breast Core Needle Biopsy and Clip Placement    Category 4    Dictated by:  Andre Adams MD, FACS 2023       I spent a total of 45 minutes on the date of the service which included preparing to see the patient, face-to-face patient care, completing clinical documentation, obtaining and/or reviewing separately obtained history, performing a medically appropriate examination, counseling and educating the patient/family/caregiver, ordering medications, tests, or procedures, communicating with other HCPs (not separately reported), independently interpreting results (not separately reported), communicating results to the patient/family/caregiver and care coordination (not separately reported). Discussion regarding natural history and potential progression of breast changes, timing of surveillance visits, timing and type of surveillance imaging, life-style modification, exercise, and limiting alcohol intake were performed today. I personally and independently saw and examined patient and reviewed all pertinent laboratory data and imaging studies. I have reviewed and agree with the history and review of systems as documented in the above note. This document is generated, in part, by voice recognition software and thus syntax and grammatical errors are possible. Giulia Daniels MD    CC: Patricia Steven MD    The chief complaint, extensive medical and family history, and review of systems were asked and reviewed with the patient by me. I also reviewed the note in detail. This note was partially generated using Dragon voice recognition system, and there may be some incorrect words, spellings, punctuation that were not noticed in checking the note before saving.

## 2023-02-14 ENCOUNTER — TELEPHONE (OUTPATIENT)
Dept: OBGYN CLINIC | Age: 68
End: 2023-02-14

## 2023-02-14 NOTE — TELEPHONE ENCOUNTER
I called the patient post right breast core biopsy. The patient just wanted to re-review the post biopsy incision instructions. The patient verbalized that she has not had any pain and the dressing remained dry and intact. The patient verbalized understanding of the post biopsy incision care instructions and has no further questions at this time. The patient is aware to call the office with any concerns.

## 2023-02-14 NOTE — TELEPHONE ENCOUNTER
Calling as requested for post-op as requested. She has a couple questions for [employer].

## 2023-02-20 ENCOUNTER — TELEPHONE (OUTPATIENT)
Dept: OBGYN CLINIC | Age: 68
End: 2023-02-20

## 2023-02-27 ENCOUNTER — OFFICE VISIT (OUTPATIENT)
Dept: SURGERY | Age: 68
End: 2023-02-27
Payer: MEDICARE

## 2023-02-27 ENCOUNTER — PREP FOR PROCEDURE (OUTPATIENT)
Dept: SURGERY | Age: 68
End: 2023-02-27

## 2023-02-27 VITALS
DIASTOLIC BLOOD PRESSURE: 80 MMHG | BODY MASS INDEX: 38.77 KG/M2 | RESPIRATION RATE: 16 BRPM | OXYGEN SATURATION: 99 % | HEART RATE: 83 BPM | WEIGHT: 247 LBS | TEMPERATURE: 97.3 F | HEIGHT: 67 IN | SYSTOLIC BLOOD PRESSURE: 136 MMHG

## 2023-02-27 DIAGNOSIS — Z17.0 CARCINOMA OF UPPER-OUTER QUADRANT OF RIGHT BREAST IN FEMALE, ESTROGEN RECEPTOR POSITIVE (HCC): Primary | ICD-10-CM

## 2023-02-27 DIAGNOSIS — C50.411 CARCINOMA OF UPPER-OUTER QUADRANT OF RIGHT BREAST IN FEMALE, ESTROGEN RECEPTOR POSITIVE (HCC): Primary | ICD-10-CM

## 2023-02-27 DIAGNOSIS — E66.9 OBESITY, CLASS II, BMI 35-39.9: ICD-10-CM

## 2023-02-27 DIAGNOSIS — C50.411 CARCINOMA OF UPPER-OUTER QUADRANT OF RIGHT BREAST IN FEMALE, ESTROGEN RECEPTOR POSITIVE (HCC): ICD-10-CM

## 2023-02-27 DIAGNOSIS — Z17.0 CARCINOMA OF UPPER-OUTER QUADRANT OF RIGHT BREAST IN FEMALE, ESTROGEN RECEPTOR POSITIVE (HCC): ICD-10-CM

## 2023-02-27 PROCEDURE — G8399 PT W/DXA RESULTS DOCUMENT: HCPCS | Performed by: SURGERY

## 2023-02-27 PROCEDURE — 1090F PRES/ABSN URINE INCON ASSESS: CPT | Performed by: SURGERY

## 2023-02-27 PROCEDURE — 3017F COLORECTAL CA SCREEN DOC REV: CPT | Performed by: SURGERY

## 2023-02-27 PROCEDURE — G8417 CALC BMI ABV UP PARAM F/U: HCPCS | Performed by: SURGERY

## 2023-02-27 PROCEDURE — G8427 DOCREV CUR MEDS BY ELIG CLIN: HCPCS | Performed by: SURGERY

## 2023-02-27 PROCEDURE — 1123F ACP DISCUSS/DSCN MKR DOCD: CPT | Performed by: SURGERY

## 2023-02-27 PROCEDURE — 1036F TOBACCO NON-USER: CPT | Performed by: SURGERY

## 2023-02-27 PROCEDURE — G8484 FLU IMMUNIZE NO ADMIN: HCPCS | Performed by: SURGERY

## 2023-02-27 PROCEDURE — 99215 OFFICE O/P EST HI 40 MIN: CPT | Performed by: SURGERY

## 2023-02-27 PROCEDURE — G2212 PROLONG OUTPT/OFFICE VIS: HCPCS | Performed by: SURGERY

## 2023-02-27 RX ORDER — SODIUM CHLORIDE 9 MG/ML
INJECTION, SOLUTION INTRAVENOUS PRN
OUTPATIENT
Start: 2023-02-27

## 2023-02-27 RX ORDER — SODIUM CHLORIDE 0.9 % (FLUSH) 0.9 %
5-40 SYRINGE (ML) INJECTION EVERY 12 HOURS SCHEDULED
OUTPATIENT
Start: 2023-02-27

## 2023-02-27 RX ORDER — SODIUM CHLORIDE 0.9 % (FLUSH) 0.9 %
5-40 SYRINGE (ML) INJECTION PRN
OUTPATIENT
Start: 2023-02-27

## 2023-02-27 NOTE — PROGRESS NOTES
CANCER TALK    PATIENT:  Sukhdeep Diaz    DATE:     2/27/23    SURGICAL DISCUSSION:    Vitals:    02/27/23 1150   BP: 136/80   Pulse: 83   Resp: 16   Temp: 97.3 °F (36.3 °C)   SpO2: 99%   Weight: 247 lb (112 kg)   Height: 5' 7\" (1.702 m)        Nba Coates is a 69y.o. year old  female who presents for a discussion right breast cancer. We underwent a description of the pathology of her tumor, the clinical stage, her treatment options of breast conservation versus simple mastectomy and sentinel lymph node biopsy. These were all discussed with her. The patient is aware that by having a sentinel lymph node if the sentinel lymph node is negative on frozen section and the final pathology is positive she will need to return to the Operating Room. She is also aware there is a 5% chance that the sentinel lymph node may not be identified at surgery and that she may need to have a completion axillary dissection. There is a 3-5% chance of a false/negative finding on sentinel lymph node biopsy. The indications for chemotherapy and radiation therapy were also discussed. The patient has undergone explanation of the complications of the procedures which are including but not limited to bleeding, infection, nerve injury and lymphedema. The variation in lymphedema rates between sentinel lymph node biopsy and the completion axillary dissection were discussed. The patient is aware that if she meets all the criteria of having clear margins that the survival and local recurrence rate for mastectomy and breast conservation are the same. The potential risk of local recurrence is 5-6%. She is aware that if her margins return positive on the lumpectomy specimen that she would need to have either a re-excision for margins or a completion mastectomy. Multiple questions were answered and the patient wanted to schedule a right breast lumpectomy and sentinel lymph node biopsy at the next available time.       All treatment recommendations are per NCCN guidelines. We discussed cultural and personal values in her medical treatment options. Barriers to care were discussed. We discussed pain after surgical treatment and options for treatment including OTC medication, ice therapy, and rarely prescription narcotics. Referrals to physical therapy for prehab, NP for presurgical consideration / exercise conversations and  are given to all presurgery. GENETIC COUNSELING RISK ASSESSMENT, DISCUSSION, AND SUGGESTED FOLLOW UP:    We reviewed the natural history and genetic etiology of sporadic, familial and hereditary cancer syndromes. The patient's personal and family history is potentially suggestive of: Hereditary Breast and Ovarian Cancer Syndrome. The patient meets NCCN HBOC testing criteria based on her personal and family history. Her personal history of breast cancer. We discussed that identification of a hereditary cancer syndrome may help her care providers tailor the patients medical management. If a mutation indicating Hereditary Breast and Ovarian Cancer Syndrome is detected in this case, the [address] [place] [address] recommendations would include increased cancer surveillance and prophylactic surgery options. If a mutation is detected, the patient will be referred back to the referring provider and to any additional appropriate care providers to discuss the relevant options. Inheritance of hereditary cancer syndromes was discussed with the patient. If a mutation is not found in the patient, this will decrease the likelihood of Hereditary Breast and Ovarian Cancer Syndrome as the explanation for her personal history. Cancer surveillance options would be discussed for the patient according to the appropriate standard [facility] guidelines, with consideration of their personal and family history risk factors.  In this case, the patient will be referred back to their care providers for discussions of management. The patient was offered [facility] Hereditary Cancer test, BRCA 1 and BRCA 2 with 38 genes for Multi-Cancer. After considering the risks, benefits, and limitations, the patient chose to pursue and provided informed consent for the following testing:  [facility] Hereditary Cancer test, BRCA 1 and BRCA 2 with 38 genes for Multi-Cancer. IMPRESSION:    Cancer Staging  Carcinoma of upper-outer quadrant of right breast in female, estrogen receptor positive ([facility] [address]  Staging form: Breast, AJCC 8th Edition  - Clinical: Stage IA (cT1, cN0, cM0, G2, ER+, WY+, HER2-) - Signed by Malro Funez MD on 2/27/2023        Diagnosis Orders   1. Carcinoma of upper-outer quadrant of right breast in female, estrogen receptor positive ([facility] [address]  [facility] Breast STAT (11+40)           Consultations to    Plastic Surgery Yes, But Patient declined  Radiation/Oncology Yes   Medical/Oncology  Yes  Genetic Counseling Yes  Fertility issues  Not Applicable    Yes  Psychology Yes, But Patient declined    Nutrition counseling given:  Recommend an active lifestyle, healthy diet, limited alcohol intake, achieve and maintain a healthy BMI to optimize breast cancer outcomes    Dictated by:  Sherry Frias MD 2/27/23    Total face to face time was 80 minutes today with greater than 50% spent on counseling the patient or coordinating her care, reviewing records prior to visit, history, physical exam, reviewing imaging. Discussion regarding natural history and potential progression of breast changes, timing of surveillance visits, timing and type of surveillance imaging, life-style modification, exercise, and limiting alcohol intake were performed today.      Cc: Zack Colindres MD     This note was partially generated using Dragon voice recognition system, and there may be some incorrect words, spellings, punctuation that were not noticed in checking the note before saving.

## 2023-02-27 NOTE — PATIENT INSTRUCTIONS
Patient Education        Breast Cancer (BRCA) Gene Testing: Care Instructions  Overview     BRCA1 and BRCA2 are genes that help control normal cell growth. Sometimes, people inherit changes in one of these genes. These changes are called mutations. If you inherit a mutation in a BRCA (say \"Rito\") gene, you have a greater risk of breast and ovarian cancers as well as some other cancers, such as prostate and pancreatic cancers. BRCA gene changes aren't common. If you are concerned that you may have a BRCA gene change, talk with your doctor. You can have genetic testing to find out if you have the BRCA mutation. A test may look just for BRCA gene changes. Or you may have a multigene panel test that also looks for other genes that can raise your cancer risk. Follow-up care is a key part of your treatment and safety. Be sure to make and go to all appointments, and call your doctor if you are having problems. It's also a good idea to know your test results and keep a list of the medicines you take. Why is the test done? A BRCA blood test is done to learn if you have BRCA gene changes. You may feel better if the test shows that you don't have a BRCA mutation. If the test shows that you do have a BRCA mutation, you may be able to make some decisions that could reduce your cancer risk. What happens after a breast cancer gene (BRCA) test?  The results of a BRCA gene test can help you find out how high your cancer risk is. If it is high, you might decide to take steps to lower your risk. There are several things you might do, such as:  Have checkups and tests more often. Have surgery to remove your breasts. Have surgery to remove your ovaries. Take medicines that may help prevent breast cancer. What are the risks of the test?  A negative test may give you a false sense of security. So you may not have the regular tests that help find cancer at an early stage.  But a negative BRCA test does not mean that you will never have breast or ovarian cancer.  A positive test result may cause anxiety or depression. A positive BRCA test does not mean that you will definitely get breast or ovarian cancer.  What can you do to reduce the risk of breast cancer?  Your risk for breast cancer increases as you get older. There is no known way to prevent breast cancer. But with some cancers, finding them early can increase your chances of successful treatment.  Here are some steps you can take to help reduce your risk:  Get familiar with the look and feel of your breasts. This will help you notice any changes. Call your doctor if you notice a change.  Have regular breast exams by your doctor or nurse. Ask your doctor how often you should get them.  Have regular mammograms. A mammogram is a picture of your breast tissue. It can find changes in your breast before you can feel them. Talk to your doctor about when to get this test.  You can also help take care of yourself and reduce your risk of cancer if you:  Stay at a healthy weight.  Eat a healthy, low-fat diet.  Get some exercise every day. If you don't usually exercise, walking is a good way to start.  Don't smoke. If you need help quitting, talk to your doctor about stop-smoking programs and medicines. These can increase your chances of quitting for good.  If you drink alcohol, limit how much you drink. Any amount of alcohol may increase your risk for some types of cancer.  Breastfeed. There is some evidence that breastfeeding may lower the risk of breast cancer. The benefit seems to be greatest in those who have  for longer than 12 months or who  several children.  BRCA gene changes  People who do a gene test and find out that they have a BRCA gene change have some options to manage their cancer risk.  For female breast cancer: If you haven't yet had cancer, you may want to think about starting breast cancer screening at a younger age, taking medicine, and having  preventive surgery. For male breast cancer: You may want to think about doing breast self-exams and having clinical breast exams. (And it's a good idea to have prostate cancer screenings too.)  If you have a BRCA gene change, talk with your doctor about how you can manage your cancer risk. Where can you learn more? Go to http://www.woods.com/ and enter U252 to learn more about \"Breast Cancer (BRCA) Gene Testing: Care Instructions. \"  Current as of: August 2, 2022               Content Version: 13.5  © 7859-7609 [employer]. Care instructions adapted under license by [facility] ([facility]). If you have questions about a medical condition or this instruction, always ask your healthcare professional. [address] any warranty or liability for your use of this information. Patient Education        Preventing Lymphedema After Treatment for Breast Cancer: Care Instructions  Your Care Instructions     Lymphedema is a buildup of fluid in the soft tissues of the body. It can happen in the arm after breast cancer surgery to remove lymph nodes. If there are few or no lymph nodes, fluid can build up in the arm. It can also happen if the lymph system in an arm has been damaged. Infection, tumors, and scar tissue from radiation therapy to the armpit area also can cause fluid to build up. You may be able to avoid lymphedema or keep it under control by following the tips below. Make sure that you take good care of the skin on your arm and hand. Your skin acts as a barrier to keep out bacteria and prevent infection. It is also important not to overuse the muscles in the arm. And don't expose your arm to very hot or cold temperatures. Lymphedema can happen soon after breast cancer treatment. Or it may happen many years later. It may affect only part of your arm or hand. In some cases, it affects all of the arm. Make sure to follow these precautions even after you finish treatment. Do not ignore tightness or swelling in or around your arm or hand. You are less likely to have long-term problems if you get these symptoms treated right away. Follow-up care is a key part of your treatment and safety. Be sure to make and go to all appointments, and call your doctor if you are having problems. It's also a good idea to know your test results and keep a list of the medicines you take. How can you care for yourself at home? Skin care    Keep your arm, hand, and armpit clean. Use a mild soap that does not dry out your skin. Moisturize your skin often. Take good care of the skin around your fingernails. Do not bite or cut your cuticles. Ask your doctor how to handle any cuts, scratches, insect bites, or other injuries you may get. Use sunscreen and insect repellent outdoors to protect your skin from sunburn and insect bites. Use an electric razor if you shave your armpit. It's less likely to cut or irritate your skin. Activity    Don't wear clothing or jewelry that is tight on your arm or hand. Your doctor may advise you not to wear a watch or rings on the affected hand. Wear gloves when you do activities that could hurt the skin on your fingers or hand. Wear them when you garden, do yard work, wash dishes, and clean with chemicals. Use oven mitts when you handle hot food. Do not have blood drawn from the arm on the side of the lymph node surgery. Do not get injections (shots) or have an IV put in the affected arm. Do not allow a blood pressure cuff to be placed on that arm. If you are in the hospital, make sure you tell your nurse and other hospital staff about your condition. Do not expose your arm to very hot or very cold temperatures. For example, don't use hot tubs, saunas, or steam rooms. Don't use a heating pad or cold pack on that arm or shoulder. Rest your arm often when you do repeated movements, such as vacuum, scrub, or mop.      Try to use your other arm to carry heavy things, such as grocery bags. If you carry a briefcase or a purse, avoid shoulder straps and carry it on your good arm. Ask your doctor about wearing a compression sleeve and glove (gauntlet). Your doctor may want you to wear these when you exercise or when you fly in an airplane. They can help keep fluid from pooling in your arm and hand. Exercise    Ask your doctor about exercises for your arm and hand. Your doctor may recommend that you see a physical therapist. This person can teach you how to do self-massage to move fluid out of your arm. Check with your doctor before you start exercises that use the arm. This includes tennis, rowing, or weight lifting. Your doctor can help you find an activity level that's right for you. When should you call for help? Call your doctor now or seek immediate medical care if:    You have signs of infection, such as: Increased pain, swelling, warmth, or redness. Red streaks leading from the area. Pus draining from the area. A fever. Watch closely for changes in your health, and be sure to contact your doctor if:    You have new or worse symptoms from lymphedema. You do not get better as expected. Where can you learn more? Go to http://www.woods.com/ and enter G546 to learn more about \"Preventing Lymphedema After Treatment for Breast Cancer: Care Instructions. \"  Current as of: May 4, 2022               Content Version: 13.5  © 2006-2022 [employer]. Care instructions adapted under license by [facility] ([facility]). If you have questions about a medical condition or this instruction, always ask your healthcare professional. [place] [address] any warranty or liability for your use of this information. Patient Education        Learning About Breast Cancer Treatments  Your Care Instructions     Breast cancer means abnormal cells grow out of control in one or both breasts.  These cancer cells can spread from the breast to nearby lymph nodes and other tissues. They can also spread to other parts of the body. The type and stage of cancer you have is based on:  Where in the breast the cancer started. The genetics of those cancer cells. How far cancer has spread within the breast, to nearby tissues, or to other organs. What the cancer cells look like under a microscope. Whether there is cancer in the nearby lymph nodes. Tests that help find the stage of your cancer can help choose the right treatment for you. These tests can include a breast biopsy, lymph node biopsy, blood tests, and X-rays. You may need other tests as well, such as a bone, CT, or MRI scan. Whether you have more tests depends on your symptoms and the stage of the cancer. When you find out that you have cancer, you may feel many emotions and may need some help coping. Seek out family, friends, and counselors for support. You also can do things at home to make yourself feel better while you go through treatment. Call the [employer] ([phone]) or visit its website at [employer] for more information. How is breast cancer treated? Your doctor may combine treatments. This is a common way to treat breast cancer. Treatment depends on what type and stage of cancer you have. You may have:  Surgery to remove the cancer. Radiation. This uses high-dose X-rays to kill cancer cells and shrink tumors. Chemotherapy. This uses medicine to kill cancer cells. Hormone therapy. This uses medicines such as tamoxifen. It limits the effect of the hormone estrogen. This hormone can help some types of breast cancer cells to grow. Targeted therapy. This uses medicines such as trastuzumab (Herceptin) to help keep cancer from growing or spreading. Immunotherapy. This uses medicines like pembrolizumab to help your immune system fight cancer. What surgeries are done for breast cancer?   Surgery is a key part of treatment for breast cancer. The main types of surgeries are:  Breast-conserving surgery, such as lumpectomy. It removes the cancer in the breast and just enough tissue to make sure that all of the cancer was removed. Surgery to remove the breast. This includes: Total mastectomy. This removes the whole breast.  Nipple-sparing mastectomy. This removes the whole breast but leaves the nipple. Modified radical mastectomy. This removes the whole breast and the lymph nodes under the arm (axillary lymph nodes). Radical mastectomy. This removes the whole breast, the chest muscles, and all the lymph nodes under the arm. If lymph nodes under the arm are removed, they are looked at under the microscope to check for cancer. There are two types of lymph node removal:  [place] lymph node biopsy removes the lymph node that is the first to receive lymph fluid from the tumor. If cancer has spread from the breast to the lymph nodes, cancer cells most often show up in the sentinel node first.  Axillary lymph node dissection removes most of the lymph nodes in the armpit. The type of surgery you have depends on the size, location, and type of the cancer. It also depends on your overall health and personal preferences. Even if your doctor removes all the cancer that can be seen at the time of your surgery, you may still need more treatment. You may get radiation, chemotherapy, or hormone therapy after surgery to try to kill any cancer cells that may be left. No matter what kind of surgery you have, you will get information about why you are having it, what its risks are, how to prepare, and what to do after surgery. Follow-up care is a key part of your treatment and safety. Be sure to make and go to all appointments, and call your doctor if you are having problems. It's also a good idea to know your test results and keep a list of the medicines you take. Where can you learn more?   Go to http://www.woods.com/ and enter X810 to learn more about \"Learning About Breast Cancer Treatments. \"  Current as of: May 4, 2022               Content Version: 13.5  © 2006-2022 [employer]. Care instructions adapted under license by [facility] ([facility]). If you have questions about a medical condition or this instruction, always ask your healthcare professional. [address] any warranty or liability for your use of this information. Patient Education        [place] Node Biopsy for Breast Cancer: Before Your Procedure  What is a sentinel node biopsy for breast cancer? A sentinel node biopsy is a type of procedure. It checks to see if breast cancer has spread to certain lymph nodes in your armpit. These are called sentinel lymph nodes. First the doctor injects a blue dye or a radioactive material into your breast. You may get both. These flow through the lymph system to help the doctor find the correct lymph nodes. Then the doctor makes a small cut to remove your sentinel lymph nodes. Sometimes the doctor removes other lymph nodes too, if it looks like the cancer has spread. The cut is called an incision. It leaves a scar that usually fades with time. The dye leaves a blue tyrone on your breast. It will fade in a few weeks. If the test shows that your cancer has spread to your lymph nodes, you and your doctor will discuss what you can do. Your doctor may remove more lymph nodes. Or you may decide to use chemotherapy or radiation. You will probably go home the same day. Most people can go back to work and their usual routine in 2 to 7 days. This type of biopsy often is done at the same time as other breast surgeries. If this is the case, you will get information about those too. How do you prepare for the procedure? Procedures can be stressful. This information will help you understand what you can expect. And it will help you safely prepare for your procedure.   Preparing for the procedure    Be sure you have someone to take you home. Anesthesia and pain medicine will make it unsafe for you to drive or get home on your own. Understand exactly what procedure is planned, along with the risks, benefits, and other options. Tell your doctor ALL the medicines, vitamins, supplements, and herbal remedies you take. Some may increase the risk of problems during your procedure. Your doctor will tell you if you should stop taking any of them before the procedure and how soon to do it. If you take a medicine that prevents blood clots, your doctor may tell you to stop taking it before your procedure. Or your doctor may tell you to keep taking it. (These medicines include aspirin and other blood thinners.) Make sure that you understand exactly what your doctor wants you to do. Make sure your doctor and the hospital have a copy of your advance directive. If you don't have one, you may want to prepare one. It lets others know your health care wishes. It's a good thing to have before any type of surgery or procedure. What happens on the day of the procedure? Follow the instructions exactly about when to stop eating and drinking. If you don't, your procedure may be canceled. If your doctor told you to take your medicines on the day of the procedure, take them with only a sip of water. Take a bath or shower before you come in for your procedure. Do not apply lotions, perfumes, deodorants, or nail polish. Take off all jewelry and piercings. And take out contact lenses, if you wear them. At the hospital or surgery center   Bring a picture ID. You will be kept comfortable and safe by your anesthesia provider. The anesthesia may make you sleep. Or you may get medicine that relaxes you or puts you in a light sleep. The area being worked on will be numb. The procedure will take about 1 hour. When should you call your doctor? You have questions or concerns. You don't understand how to prepare for your procedure. You become ill before the procedure (such as fever, flu, or a cold). You need to reschedule or have changed your mind about having the procedure. Where can you learn more? Go to http://www.woods.com/ and enter U700 to learn more about \"[place] Node Biopsy for Breast Cancer: Before Your Procedure. \"  Current as of: January 20, 2022               Content Version: 13.5  © 2006-2022 [employer]. Care instructions adapted under license by [facility] ([facility]). If you have questions about a medical condition or this instruction, always ask your healthcare professional. [place] [address] any warranty or liability for your use of this information. Patient Education        Lumpectomy: Before Your Surgery  What is a lumpectomy? Breast-conserving surgery (lumpectomy) removes cancer from the breast. Your doctor will make a small cut (incision) and take out the cancer. The whole breast won't be removed. The doctor will try to also take a small amount of normal tissue around the cancer. This is known as \"getting clear margins. \" Some people will need another surgery to be sure the margins are clear. The doctor may also check the nearby lymph nodes during the surgery. After surgery, you will probably go home the same day. Most people can go back to work or their normal routine in 1 to 3 weeks. This depends on how you feel. It also depends on the type of work you do and whether you need more treatment. This may include radiation or chemotherapy. Most people who have this surgery for cancer also get radiation treatment. The surgery will leave scars. Sometimes it leaves a dent in the breast too. Most women will look normal in a bra. But your breasts may not match in size or shape after surgery. This depends on the size of your breasts. It also depends on how much tissue was removed.   When you find out that you have cancer, you may feel many emotions and may need some help coping. Seek out family, friends, and counselors for support. You also can do things at home to make yourself feel better while you go through treatment. Call the [facility] ([phone]) or visit its website at [employer] [employer] for more information. How do you prepare for surgery? Surgery can be stressful. This information will help you understand what you can expect. And it will help you safely prepare for surgery. Preparing for surgery    Be sure you have someone to take you home. Anesthesia and pain medicine will make it unsafe for you to drive or get home on your own. Understand exactly what surgery is planned, along with the risks, benefits, and other options. If you take a medicine that prevents blood clots, your doctor may tell you to stop taking it before your surgery. Or your doctor may tell you to keep taking it. (These medicines include aspirin and other blood thinners.) Make sure that you understand exactly what your doctor wants you to do. Tell your doctor ALL the medicines, vitamins, supplements, and herbal remedies you take. Some may increase the risk of problems during your surgery. Your doctor will tell you if you should stop taking any of them before the surgery and how soon to do it. Make sure your doctor and the hospital have a copy of your advance directive. If you don't have one, you may want to prepare one. It lets others know your health care wishes. It's a good thing to have before any type of surgery or procedure. What happens on the day of surgery? Follow the instructions exactly about when to stop eating and drinking. If you don't, your surgery may be canceled. If your doctor told you to take your medicines on the day of surgery, take them with only a sip of water. Take a bath or shower before you come in for your surgery. Do not apply lotions, perfumes, deodorants, or nail polish. Do not shave the surgical site yourself.      Take off all jewelry and piercings. And take out contact lenses, if you wear them. Bring a comfortable, supportive bra with you. You will need to wear this all the time, even during the night, for the first week after surgery. At the hospital or surgery center   Bring a picture ID. The area for surgery is often marked to make sure there are no errors. If your doctor can't feel the lump, a needle can be put in the suspicious area. This may be done during a mammogram just before surgery. The needle will guide your doctor. You will be kept comfortable and safe by your anesthesia provider. The anesthesia may make you sleep. Or it may just numb the area being worked on. The surgery will take about 1 hour or longer, depending on the size of the lump. When should you call your doctor? You have questions or concerns. You don't understand how to prepare for your surgery. You become ill before the surgery (such as fever, flu, or a cold). You need to reschedule or have changed your mind about having the surgery. Where can you learn more? Go to http://www.woods.com/ and enter Z252 to learn more about \"Lumpectomy: Before Your Surgery. \"  Current as of: May 4, 2022               Content Version: 13.5  © 2006-2022 [employer]. Care instructions adapted under license by [facility] ([facility]). If you have questions about a medical condition or this instruction, always ask your healthcare professional. [place] [address] any warranty or liability for your use of this information.

## 2023-03-07 ENCOUNTER — SOCIAL WORK (OUTPATIENT)
Dept: RADIATION ONCOLOGY | Age: 68
End: 2023-03-07

## 2023-03-07 ENCOUNTER — HOSPITAL ENCOUNTER (OUTPATIENT)
Dept: OCCUPATIONAL THERAPY | Age: 68
Setting detail: THERAPIES SERIES
Discharge: HOME | End: 2023-03-07
Payer: MEDICARE

## 2023-03-07 ENCOUNTER — NURSE ONLY (OUTPATIENT)
Dept: RADIATION ONCOLOGY | Age: 68
End: 2023-03-07

## 2023-03-07 PROCEDURE — 97165 OT EVAL LOW COMPLEX 30 MIN: CPT

## 2023-03-07 NOTE — PROGRESS NOTES
SW met with pt as she was seen for pre-Breast surgery education and support. Pt states that she is to have her surgery on 3/16/2023. Pt reports that she lives at home with her . She smiles while describing him, stating that he is her primary support, and he will care for her well in her convalescence. Pt states the couple have no children, as she had two miscarriages early in their marriage. In speaking of support, pt identified that she has experienced considerable and significant losses recently. She states that her mother  of dementia at the end of . She then listed multiple close friends and family who have recently , particularly two friends with cancer, and this leaves her feeling sad, but coping. Pt states both she and her  are retired. She states she had been a  position at [employer] before she left that job to care for her mother. Supportive services at the cancer center were described, and pt was provided with information for the [employer] through [facility]\"". SW contact information was provided, and pt was invited to seek support should she feel need.

## 2023-03-07 NOTE — PROGRESS NOTES
Occupational Therapy Lymphedema Prehab Screen    Date: 3/7/2023  Patient Name: Lenora Coyne        MRN: [id number]  Account: [de-identified]   : 1955  (69 y.o.)    [x]   Patient's date of birth was confirmed    Chart Review:  Diagnosis: There were no encounter diagnoses. R lumpectomy  Past Medical History:   Diagnosis Date    AC (acromioclavicular) joint bone spurs     spine, R ankle    Adenomatous polyp of colon     Arthritis     Bulging lumbar disc     Colon polyp     Diabetes mellitus (HCC)     Disc degeneration, lumbar     DJD (degenerative joint disease)     knees    Esophageal polyp     Fibromyalgia     Megaloblastic anemia due to decreased intake of vitamin B12     Ovarian cyst, right     Tendonitis     R ankle    Vitamin D deficiency      Past Surgical History:   Procedure Laterality Date    ABSCESS DRAINAGE      rectum    COLONOSCOPY  14,    polypectomy jarmosmick    COLONOSCOPY N/A 2021    COLORECTAL CANCER SCREENING, HIGH RISK with polypectomies  performed by Darnell Booker MD at [address] [place] [address] Dr      FULGURATION MINOR BLADDER LESION (W OR W/O BIOPSY)      FULGURATION MINOR BLADDER LESION (W OR W/O BIOPSY)      UPPER GASTROINTESTINAL ENDOSCOPY  ,    bx polypectomy tracy       Strength:   WFL BUES    ROM:    WNL BUES    Observation:   Mild pain R shoulder. Hx of falls on that side. Patient is R hand dominant. Circumferential Measurements    Location Rt UE (cm)   Date: 3/7/2023 Lt UE (cm)   Date:   3/7/2023   3rd DIP/ PIP 5.1/6.2 5/6   10 cm from distal 3rd finger 23 21   15 cm 23.5 21   20 cm 17 27   30 cm 24.5 23.5   40 cm 30 29   50 cm 31 30.5   60 cm 39 39                Brief Fatigue Inventory   Throughout our lives, most of us have times when we feel very tired or fatigued. Have you felt unusually tired or fatigued in the last week?  No   Scale: 0 (No Fatigue)  <<<<>>>>  10 (As Bad as You Can Imagine)   1. How would you rate your fatigue (weariness, tiredness) right NOW? 0   2. How would you rate your USUAL level of fatigue during the past 24 hours? 0   3. How would you rate your WORST level of fatigue during the past 24 hours? 0   Scale: 0 (Does Not Interfere) <<<<>>>> 10 (Completely Interferes)   4. How would you rate how your fatigue has interfered with your GENERAL ACTIVITY in the past 24 hours? 0   5. How would you rate how your fatigue has interfered with your MOOD in the past 24 hours? 0   6. How would you rate how your fatigue has interfered with your WALKING ABILITY in the past 24 hours? 0   7. How would you rate how your fatigue has interfered with your NORMAL WORK (both outside and inside the home) in the past 24 hours? 0   8. How would you rate how your fatigue has interfered with your RELATIONS WITH OTHER PEOPLE in the past 24 hours? 0   9. How would you rate how your fatigue has interfered with your ENJOYMENT OF LIFE in the past 24 hours?  0   Total Score (sum of points for all 9 items/9): 0   Levels of Fatigue:  0 = None  1 -3 = Mild  4 - 6 = Moderate  7 - 10 = Severe None   *Adapted from MD Bergman 41 Fatigue Inventory 1997*   QuickDASH  Open a tight or new jar: No Difficulty  Do heavy household chores (e.g., wash walls, floors): No Difficulty  Leeanne a shopping bag or briefcase: No Difficulty  Wash your back: No Difficulty  Use a knife to cut food: No Difficulty  Recreational activities in which you take some force or impact through your arm, shoulder, or hand (e.g., golf, hammering, tennis, etc.): No Difficulty  During the past week, to what extent has your arm, shoulder or hand problem interfered with your normal social activities with family, friends, neighbors or groups?: Not At All  During the past week, were you limited in your work or other regular daily activities as a result of your arm, shoulder or hand problem?: Not Limited At All  Arm, shoulder or hand pain: Mild  Tingling (pins and needles) in your arm, shoulder or hand: None  During the past week, how much difficulty have you had sleeping because of the pain in your arm, shoulder or hand?: No Difficulty  QuickDASH Total Score: 12  QuickDASH Disability/Symptom Score : 2.27 %  QUICKDASH Disability Index: 0%  QUICKDASH CMS Modifier: 509 [address]    Instructed pt. in lymphedema signs and symptoms and provided written hand out. Yes    Instructed pt. in ROM HEP for prevention and reduction of lymphedema symptoms and provided written handout. Yes    Pt. demo good understanding of information provided. YES___X__  NO_____ Comments:    Plan:  Follow up with physician per physician orders. Therapy Time:   OT Individual Minutes  Time In: 1410  Time Out: 1665  Minutes: 32    Electronically signed by:     JACI Anna/KENNY,

## 2023-03-07 NOTE — PROGRESS NOTES
Patient teaching completed for their scheduled right breast lumpectomy and sentinel lymph node biopsy . I reviewed Surgical instructions with the Patient, including the following information, you will receive a phone call from the surgery schedulers the day prior to the surgery, usually between 3-5 pm, to let you know what time to report to surgery. A map of [facility] was given to the Patient with instructions on where to park and go the day of surgery. The Patient was instructed to Not Eat or drink anything after midnight, before their surgery. Approved medications, which you have discussed with your Doctor, can be taken the morning of surgery, with sips of water. All blood thinners, oral anticoagulants, Aspirin, NSAIDS, should be stopped at least ten days prior to surgery. Please follow your Doctors instructions. Do not chew gum, take cough drops or eat hard candy the morning of your surgery. Do not wear any jewelry. Remove all body piercings prior to arriving for surgery. Leave all valuable items at home. Avoid hairspray, make-up, deodorant, lotions, nail polish or acrylic nails. Bring all personal care items, toiletries, and a loose fitting shirt that buttons or zips in the front to wear when discharged. Dont forget to bring your cell phone . Bring your cases for dentures and glasses, do not wear contact lenses. You will need an adult to drive you home after your surgery or when you are discharged from the hospital.No driving for one week after Lumpectomy. The Surgeon will call you with your pathology results in about 7-10 days. Do not lift greater than 10 pounds or use the affected arm to reach upwards after surgery, until the Doctor clears you post operatively. You may be asked to wear a surgical bra. If it is recommended that you wear one,it will be given to you after your surgery. Wear the surgical bra  or ace wrap for the first 48 hours after surgery.  After surgery, you will need to take care of your incision as it heals. Either stitches, staples,tape strips ( steri-strips),or surgical glue were used to close your incision. You will need to keep the area clean, change the dressing according to the wound care instructions that were given to you and observe for s/s of infection at each dressing change. You may notice soreness, tenderness, tingling, numbness and itching around your incision. There may also be mild oozing and bruising, and a small lump may form. This is normal and no cause for concern. Placing an ice bag on the site,it may help with any swelling or discomfort. After 48 hours you may, remove the outer gauze bandage. You can take a shower. Allow the soap and water to just run over the incision, do not scrub the incision, gently pat the area dry. If steri strips or surgical glue are covering the incision,do not remove, the surgeon will address the steri strips or glue at your post operative appointment. Redress the wound with gauze/Band-Aid as needed. Take Tylenol or Motrin for pain. If you notice any of the following signs of an infection, such as, a yellow or green discharge that is increasing, a change in the odor of the discharge/drainage, a change in the size of the incision,redness or hardening of the area surrounding the incision or if it is  hot to the touch, a fever, or excessive bleeding that has soaked through the dressing, notify the surgeon as soon as possible. I educated the Patient on MONICA drains . I explained that they may have an external drainage device after surgery. I reviewed how to empty and record the drainage output on the log sheet they were given. The Patient is aware to call Dr. Dillon Chow, if they develop a fever over 101? F, increased drainage (more than 240 cc 8 ounces per drain over one 24-hour period), or increased pain not controlled by the pain medication. I explained that the fluid output should gradually decrease.  The color, although not vital, might change from red to red-orange, to straw color. If it is thick, pus -like, and / or changes to red,to please call Dr. Rosetta Emerson office. The ideal time to remove the drains is when the output is less than 30 cc per 24-hours for 2 days consecutively. It can take up to two weeks for that to happen. Dr. Grady Cardenas will typically remove the drains if it has been more than two weeks. Patient is aware to bring the drainage log to the post operative appointment. If the drainage record indicates that there is still too much fluid draining to have the drain removed, Dr. Grady Cardenas will determine when you should return to the office. Patient is aware to empty, measure and to record the drainage at least three times daily, using the cc side of the measuring cup. They were given a lanyard and it was explained that the drains should be pinned to the Bra, clothing or the lanyard so the drain will not be pulled out of the body. I reviewed the procedure for emptying the drain as follows, get all the supplies and work in a clean area: measuring cup(s), drain sheet, pen . You and/or your assistant must wash and dry your hands. Unpin the drain(s). Open the stopper out of the bulb and empty the fluid into the measuring cup. Educated the Patient on the importance of milking/ stripping the tubing three times a day and it was practiced on a MONICA drain. Patient was then taught to squeeze the drainage bulb while firmly pushing the stopper back into place. Then to, re-pin the drain to their bra/clothing/lanyard. Then make sure to record the amount of drainage on the log. Empty the cup into the toilet or sink, then rinse and store it for future use. Begin recording the drainage the day you are discharged from the hospital.Pre-Operative Lymphedema arm measurements completed by OT. I educated the Patient on Lymphedema and how to protect the affected limb to avoid injuries. I reviewed post operative exercises. The Patient is aware to not begin exercising until the surgeon has approved exercising post operatively. Your postop appointment is on 03/29/2023. The Patient verbalized understanding of all of the surgical instructions and has no further questions at this time.

## 2023-03-08 ENCOUNTER — TELEPHONE (OUTPATIENT)
Dept: OBGYN CLINIC | Age: 68
End: 2023-03-08

## 2023-03-09 ENCOUNTER — HOSPITAL ENCOUNTER (OUTPATIENT)
Dept: PREADMISSION TESTING | Age: 68
Discharge: HOME | End: 2023-03-13
Payer: MEDICARE

## 2023-03-09 ENCOUNTER — TELEPHONE (OUTPATIENT)
Dept: SURGERY | Age: 68
End: 2023-03-09

## 2023-03-09 VITALS
HEART RATE: 77 BPM | SYSTOLIC BLOOD PRESSURE: 150 MMHG | RESPIRATION RATE: 16 BRPM | TEMPERATURE: 98 F | OXYGEN SATURATION: 99 % | DIASTOLIC BLOOD PRESSURE: 67 MMHG | HEIGHT: 67 IN | BODY MASS INDEX: 38.83 KG/M2 | WEIGHT: 247.4 LBS

## 2023-03-09 LAB
ANION GAP SERPL CALCULATED.3IONS-SCNC: 9 MEQ/L (ref 9–15)
BUN BLDV-MCNC: 11 MG/DL (ref 8–23)
CALCIUM SERPL-MCNC: 9.5 MG/DL (ref 8.5–9.9)
CHLORIDE BLD-SCNC: 99 MEQ/L (ref 95–107)
CO2: 27 MEQ/L (ref 20–31)
CREAT SERPL-MCNC: 0.72 MG/DL (ref 0.5–0.9)
GFR SERPL CREATININE-BSD FRML MDRD: >60 ML/MIN/{1.73_M2}
GLUCOSE BLD-MCNC: 211 MG/DL (ref 70–99)
HBA1C MFR BLD: 8.1 % (ref 4.8–5.9)
HCT VFR BLD CALC: 43.6 % (ref 37–47)
HEMOGLOBIN: 14.5 G/DL (ref 12–16)
MCH RBC QN AUTO: 32.4 PG (ref 27–31.3)
MCHC RBC AUTO-ENTMCNC: 33.2 % (ref 33–37)
MCV RBC AUTO: 97.5 FL (ref 79.4–94.8)
PDW BLD-RTO: 13.7 % (ref 11.5–14.5)
PLATELET # BLD: 131 K/UL (ref 130–400)
POTASSIUM SERPL-SCNC: 4 MEQ/L (ref 3.4–4.9)
RBC # BLD: 4.48 M/UL (ref 4.2–5.4)
SODIUM BLD-SCNC: 135 MEQ/L (ref 135–144)
WBC # BLD: 4.6 K/UL (ref 4.8–10.8)

## 2023-03-09 PROCEDURE — 86900 BLOOD TYPING SEROLOGIC ABO: CPT

## 2023-03-09 PROCEDURE — 83036 HEMOGLOBIN GLYCOSYLATED A1C: CPT

## 2023-03-09 PROCEDURE — 80048 BASIC METABOLIC PNL TOTAL CA: CPT

## 2023-03-09 PROCEDURE — 86850 RBC ANTIBODY SCREEN: CPT

## 2023-03-09 PROCEDURE — 85027 COMPLETE CBC AUTOMATED: CPT

## 2023-03-09 PROCEDURE — 86901 BLOOD TYPING SEROLOGIC RH(D): CPT

## 2023-03-09 RX ORDER — ORAL SEMAGLUTIDE 14 MG/1
TABLET ORAL
COMMUNITY

## 2023-03-09 ASSESSMENT — ENCOUNTER SYMPTOMS
CONSTIPATION: 0
PHOTOPHOBIA: 0
EYE REDNESS: 0
ABDOMINAL PAIN: 0
BACK PAIN: 1
WHEEZING: 0
ABDOMINAL DISTENTION: 0
SORE THROAT: 0
TROUBLE SWALLOWING: 0
EYE DISCHARGE: 0
DIARRHEA: 0
RHINORRHEA: 0
VOMITING: 0
EYE ITCHING: 0
SHORTNESS OF BREATH: 0
NAUSEA: 0
BLOOD IN STOOL: 0
ALLERGIC/IMMUNOLOGIC NEGATIVE: 1
COUGH: 0
CHEST TIGHTNESS: 0
SINUS PRESSURE: 0
EYE PAIN: 0
SINUS PAIN: 0

## 2023-03-09 NOTE — TELEPHONE ENCOUNTER
I informed the patient of her negative STAT panel of ten genes, genetic testing results. The patient is aware that the remaining 30 genes have not finalized at this time. The patient verbalized understanding and has no questions at this time.

## 2023-03-09 NOTE — H&P
Preoperative Consultation      Name: Sharee Sylvester  YOB: 1955  Date of Service: 3/9/2023  PCP: Westley Esteban MD    CHIEF COMPLAINT:  right breast cancer    HISTORY OF PRESENT ILLNESS:      The patient is a 69 y.o. female with significant past medical history of right breast cancer, DM2, HPL, arthritis, who presents for a preoperative consultation at the request of surgeon, Dr. Wendy Puga, who plans on performing right breast lumpectomy and sentinel lymph node biopsy on 3/16/23 at [facility] [place]. Pt reports she has been getting scheduled mammograms every 6 months monitoring a spot on her right breast for \"a while\". She reports she had a scheduled mammogram in Feb 2023 and it came back abnormal. She reports she had abnormal ultrasound and biopsy and was diagnosed with right breast cancer. She reports she can not feel any changes in her right or left breasts. She denies any breast pain in bilateral breasts or nipple drainage. She reports she has full ROM of bilateral upper extremities. She reports she has family hx of breast cancer in her maternal grandmother.      Smoking hx: former smoker, quit 1970, 0.5 pk/day for 5 years    Known Anesthesia Problems: History post op n/v  Bleeding Risk: No recent or remote history of abnormal bleeding  Patient Objection to Receiving Blood Products: No  Personal of FH of DVT/PE: No    Medical/Cardiac Clearance: No    Past Medical History:        Diagnosis Date    AC (acromioclavicular) joint bone spurs     spine, R ankle    Adenomatous polyp of colon     Arthritis     Bulging lumbar disc     Cancer (HCC)     right breast cancer    Colon polyp     Diabetes mellitus (HCC)     Disc degeneration, lumbar     DJD (degenerative joint disease)     knees    Esophageal polyp 2011    Fibromyalgia     Hyperlipidemia     Megaloblastic anemia due to decreased intake of vitamin B12     Ovarian cyst, right     PONV (postoperative nausea and vomiting)     Tendonitis     R ankle    Vitamin D deficiency      Past Surgical History:    Past Surgical History:   Procedure Laterality Date    ABSCESS DRAINAGE      rectum    BREAST BIOPSY      right breast 2/23    COLONOSCOPY  8/1/14,8/09    polypectomy tracy    COLONOSCOPY N/A 08/19/2021    COLORECTAL CANCER SCREENING, HIGH RISK with polypectomies  performed by Alfredo Chavez MD at [address] [place] [address]      bilateral cataract surgery    FULGURATION MINOR BLADDER LESION (W OR W/O BIOPSY)      FULGURATION MINOR BLADDER LESION (W OR W/O BIOPSY)      SKIN BIOPSY      stomach and back of neck-negative for cancer    UPPER GASTROINTESTINAL ENDOSCOPY  11/2011    bx polypectomy tracy       Allergies:    Amoxicillin, Amoxicillin-pot clavulanate, Aspirin, Cephalexin, Codeine, Corticosteroids, Darvocet [propoxyphene n-acetaminophen], Helidac [metronid-tetracyc-bis subsal], Meclizine, Metoclopramide, Nizatidine, Sitagliptin, Trimox [amoxicillin trihydrate], Zithromax [azithromycin dihydrate], Adhesive tape, Metformin, Miconazole-zinc oxide-petrolat, Nickel, and Penicillins    Medications Prior to Admission:    Current Outpatient Medications   Medication Sig Dispense Refill    NONFORMULARY Consuelo (OTC used for vertigo)      Cyanocobalamin (VITAMIN B-12 PO) Take by mouth      Semaglutide (RYBELSUS) 14 MG TABS Take by mouth      pravastatin (PRAVACHOL) 10 MG tablet Take 1 tablet by mouth daily      Naproxen Sodium 220 MG CAPS Take 1 tablet by mouth every 12 hours as needed      clobetasol prop emollient base 0.05 % CREA Apply 1 Dose topically daily as needed      cetirizine (ZYRTEC) 10 MG tablet Take 1 tablet by mouth daily as needed      Famotidine (PEPCID PO) Take 1 tablet by mouth as needed      vitamin D (CHOLECALCIFEROL) 1000 UNIT TABS tablet Take 1,000 Int'l Units by mouth daily. No current facility-administered medications for this encounter.        Social History:   Social History     Socioeconomic History    Marital status:      Spouse name: Not on file    Number of children: Not on file    Years of education: Not on file    Highest education level: Not on file   Occupational History    Occupation:     Tobacco Use    Smoking status: Former     Packs/day: 0.50     Years: 5.00     Pack years: 2.50     Types: Cigarettes     Quit date:      Years since quittin.2    Smokeless tobacco: Never   Vaping Use    Vaping Use: Never used   Substance and Sexual Activity    Alcohol use: Not Currently    Drug use: No    Sexual activity: Not Currently   Other Topics Concern    Not on file   Social History Narrative    Not on file     Social Determinants of Health     Financial Resource Strain: Not on file   Food Insecurity: Not on file   Transportation Needs: Not on file   Physical Activity: Not on file   Stress: Not on file   Social Connections: Not on file   Intimate Partner Violence: Not on file   Housing Stability: Not on file       Family History:       Problem Relation Age of Onset    Dementia Mother     Thyroid Disease Mother     Dementia Father     Thyroid Disease Sister     High Blood Pressure Brother     Cancer Maternal Grandmother         uterine cancer, breast cancer, lung cancer    Breast Cancer Maternal Grandmother        Review of Systems   Constitutional:  Negative for appetite change, chills, diaphoresis, fatigue, fever and unexpected weight change. HENT:  Positive for nosebleeds (2 days ago mild nose bleed) and sneezing (allergies). Negative for congestion, ear discharge, ear pain, hearing loss, mouth sores, postnasal drip, rhinorrhea, sinus pressure, sinus pain, sore throat, tinnitus and trouble swallowing. Eyes:  Negative for photophobia, pain, discharge, redness, itching and visual disturbance. Glasses   Respiratory:  Negative for cough, chest tightness, shortness of breath and wheezing.     Cardiovascular:  Negative for chest pain, palpitations and leg swelling. Gastrointestinal:  Negative for abdominal distention, abdominal pain, blood in stool, constipation, diarrhea, nausea and vomiting. Endocrine: Negative. Genitourinary:  Negative for decreased urine volume, difficulty urinating, dysuria, frequency, hematuria and urgency. Musculoskeletal:  Positive for arthralgias, back pain (intermittent chronic back pain) and gait problem (intermittent use of a cane when knees are hurting, denies any current falls). Negative for myalgias, neck pain and neck stiffness. Skin:  Negative for rash and wound. Allergic/Immunologic: Negative. Neurological:  Negative for dizziness, tremors, seizures, syncope, weakness, light-headedness, numbness and headaches. Hematological: Negative. Psychiatric/Behavioral:          Hx depression/anxiety     Vitals:  BP (!) 150/67   Pulse 77   Temp 98 °F (36.7 °C) (Temporal)   Resp 16   Ht 5' 6.54\" (1.69 m)   Wt 247 lb 6.4 oz (112.2 kg)   SpO2 99%   BMI 39.29 kg/m²       Physical Exam  Constitutional:       General: She is not in acute distress. Appearance: Normal appearance. She is not ill-appearing, toxic-appearing or diaphoretic. HENT:      Head: Normocephalic and atraumatic. Right Ear: Tympanic membrane, ear canal and external ear normal. There is no impacted cerumen. Left Ear: Tympanic membrane, ear canal and external ear normal. There is no impacted cerumen. Nose: Nose normal. No congestion or rhinorrhea. Mouth/Throat:      Mouth: Mucous membranes are moist.      Pharynx: Oropharynx is clear. No oropharyngeal exudate or posterior oropharyngeal erythema. Eyes:      General: No scleral icterus. Right eye: No discharge. Left eye: No discharge. Extraocular Movements: Extraocular movements intact. Conjunctiva/sclera: Conjunctivae normal.      Pupils: Pupils are equal, round, and reactive to light.    Cardiovascular:      Rate and Rhythm: Normal rate and regular rhythm. Pulses: Normal pulses. Heart sounds: Normal heart sounds. No murmur heard. Pulmonary:      Effort: Pulmonary effort is normal. No respiratory distress. Breath sounds: Normal breath sounds. No wheezing. Comments: Full breast exam deferred to Dr. Bello Malagon  Abdominal:      General: Bowel sounds are normal. There is no distension. Palpations: Abdomen is soft. Tenderness: There is no abdominal tenderness. There is no guarding. Genitourinary:     Comments: Deferred  Musculoskeletal:         General: No swelling. Normal range of motion. Cervical back: Normal range of motion. No rigidity. Right lower leg: No edema. Left lower leg: No edema. Lymphadenopathy:      Cervical: No cervical adenopathy. Skin:     General: Skin is warm and dry. Capillary Refill: Capillary refill takes less than 2 seconds. Coloration: Skin is not jaundiced. Findings: No bruising, erythema or rash. Neurological:      General: No focal deficit present. Mental Status: She is alert and oriented to person, place, and time. Motor: No weakness. Gait: Gait normal.   Psychiatric:         Mood and Affect: Mood normal.         Behavior: Behavior normal.         Thought Content:  Thought content normal.         Judgment: Judgment normal.       Assessment:  69 y.o. patient with   Patient Active Problem List   Diagnosis    Adenomatous polyp of colon    Adenomatous polyp of ascending colon    Adenomatous polyp of transverse colon    Weakness    Vitamin D deficiency    Type 2 diabetes mellitus (HCC)    Tinea corporis    Strain of shoulder    Primary localized osteoarthrosis of ankle and foot    Primary dysthymia    Pain in limb    Osteopenia    Obesity (BMI 35.0-39.9 without comorbidity)    Injury of wrist    Hyperlipidemia    Hyperglycemia    Contusion of face    Combined forms of age-related cataract of both eyes    Cobalamin deficiency    Calcaneal spur Biliary colic    Atopic dermatitis    Allergic rhinitis    Abnormal mammogram    Accidental fall    Abnormal mammogram of right breast    Mass of upper outer quadrant of right breast    Obesity, Class II, BMI 35-39.9    Carcinoma of upper-outer quadrant of right breast in female, estrogen receptor positive ([facility] [address]      with planned surgery as above.     Plan:  Preoperative workup as follows: PAT, CBC, BMP, hemoglobin A1C, T&S  2.   Scheduled for: right breast lumpectomy and sentinel lymph node biopsy on 3/16/23    ALEX Lawrence - CNP  3/9/2023  2:51 PM

## 2023-03-09 NOTE — TELEPHONE ENCOUNTER
I was calling to inform the patient of her negative STAT genetic testing panel. LMOM for the patient to return a call to the office.

## 2023-03-09 NOTE — TELEPHONE ENCOUNTER
----- Message from Damon Zhao MD sent at 3/8/2023  9:37 AM EST -----  Regarding: call with [facility] genetics    ----- Message -----  From: [address] [place] [address]  Sent: 3/6/2023   9:51 PM EST  To: Damon Zhao MD

## 2023-03-10 LAB
ABO/RH: NORMAL
ANTIBODY SCREEN: NORMAL

## 2023-03-16 ENCOUNTER — ANESTHESIA EVENT (OUTPATIENT)
Dept: OPERATING ROOM | Age: 68
End: 2023-03-16
Payer: MEDICARE

## 2023-03-16 ENCOUNTER — HOSPITAL ENCOUNTER (OUTPATIENT)
Dept: NUCLEAR MEDICINE | Age: 68
Discharge: HOME | End: 2023-03-18
Attending: SURGERY
Payer: MEDICARE

## 2023-03-16 ENCOUNTER — ANESTHESIA (OUTPATIENT)
Dept: OPERATING ROOM | Age: 68
End: 2023-03-16
Payer: MEDICARE

## 2023-03-16 ENCOUNTER — HOSPITAL ENCOUNTER (OUTPATIENT)
Age: 68
Setting detail: OUTPATIENT SURGERY
Discharge: HOME | End: 2023-03-16
Attending: SURGERY | Admitting: SURGERY
Payer: MEDICARE

## 2023-03-16 VITALS
TEMPERATURE: 97.1 F | SYSTOLIC BLOOD PRESSURE: 132 MMHG | HEART RATE: 83 BPM | RESPIRATION RATE: 16 BRPM | OXYGEN SATURATION: 92 % | DIASTOLIC BLOOD PRESSURE: 61 MMHG

## 2023-03-16 DIAGNOSIS — C50.411 MALIGNANT NEOPLASM OF UPPER-OUTER QUADRANT OF RIGHT FEMALE BREAST (HCC): ICD-10-CM

## 2023-03-16 DIAGNOSIS — C50.411 CARCINOMA OF UPPER-OUTER QUADRANT OF RIGHT BREAST IN FEMALE, ESTROGEN RECEPTOR POSITIVE (HCC): ICD-10-CM

## 2023-03-16 DIAGNOSIS — Z17.0 CARCINOMA OF UPPER-OUTER QUADRANT OF RIGHT BREAST IN FEMALE, ESTROGEN RECEPTOR POSITIVE (HCC): ICD-10-CM

## 2023-03-16 LAB
GLUCOSE BLD-MCNC: 149 MG/DL (ref 70–99)
GLUCOSE BLD-MCNC: 176 MG/DL (ref 70–99)
Lab: NEGATIVE
Lab: NORMAL
PERFORMED ON: ABNORMAL
PERFORMED ON: ABNORMAL

## 2023-03-16 PROCEDURE — 6360000002 HC RX W HCPCS: Performed by: SURGERY

## 2023-03-16 PROCEDURE — 88305 TISSUE EXAM BY PATHOLOGIST: CPT

## 2023-03-16 PROCEDURE — 7100000001 HC PACU RECOVERY - ADDTL 15 MIN: Performed by: SURGERY

## 2023-03-16 PROCEDURE — 2580000003 HC RX 258: Performed by: SURGERY

## 2023-03-16 PROCEDURE — 7100000010 HC PHASE II RECOVERY - FIRST 15 MIN: Performed by: SURGERY

## 2023-03-16 PROCEDURE — 6360000002 HC RX W HCPCS: Performed by: ANESTHESIOLOGY

## 2023-03-16 PROCEDURE — 38900 IO MAP OF SENT LYMPH NODE: CPT | Performed by: SURGERY

## 2023-03-16 PROCEDURE — 3700000000 HC ANESTHESIA ATTENDED CARE: Performed by: SURGERY

## 2023-03-16 PROCEDURE — A9520 TC99 TILMANOCEPT DIAG 0.5MCI: HCPCS | Performed by: SURGERY

## 2023-03-16 PROCEDURE — 38525 BIOPSY/REMOVAL LYMPH NODES: CPT | Performed by: SURGERY

## 2023-03-16 PROCEDURE — 64450 NJX AA&/STRD OTHER PN/BRANCH: CPT | Performed by: ANESTHESIOLOGY

## 2023-03-16 PROCEDURE — 6370000000 HC RX 637 (ALT 250 FOR IP): Performed by: ANESTHESIOLOGY

## 2023-03-16 PROCEDURE — 76998 US GUIDE INTRAOP: CPT | Performed by: SURGERY

## 2023-03-16 PROCEDURE — 2709999900 HC NON-CHARGEABLE SUPPLY: Performed by: SURGERY

## 2023-03-16 PROCEDURE — 38792 RA TRACER ID OF SENTINL NODE: CPT

## 2023-03-16 PROCEDURE — 3600000004 HC SURGERY LEVEL 4 BASE: Performed by: SURGERY

## 2023-03-16 PROCEDURE — 2500000003 HC RX 250 WO HCPCS: Performed by: ANESTHESIOLOGY

## 2023-03-16 PROCEDURE — 88307 TISSUE EXAM BY PATHOLOGIST: CPT

## 2023-03-16 PROCEDURE — 7100000011 HC PHASE II RECOVERY - ADDTL 15 MIN: Performed by: SURGERY

## 2023-03-16 PROCEDURE — 14000 TIS TRNFR TRUNK 10 SQ CM/<: CPT | Performed by: SURGERY

## 2023-03-16 PROCEDURE — 19301 PARTIAL MASTECTOMY: CPT | Performed by: SURGERY

## 2023-03-16 PROCEDURE — 88342 IMHCHEM/IMCYTCHM 1ST ANTB: CPT

## 2023-03-16 PROCEDURE — 3700000001 HC ADD 15 MINUTES (ANESTHESIA): Performed by: SURGERY

## 2023-03-16 PROCEDURE — 7100000000 HC PACU RECOVERY - FIRST 15 MIN: Performed by: SURGERY

## 2023-03-16 PROCEDURE — 3600000014 HC SURGERY LEVEL 4 ADDTL 15MIN: Performed by: SURGERY

## 2023-03-16 PROCEDURE — 6360000002 HC RX W HCPCS: Performed by: NURSE ANESTHETIST, CERTIFIED REGISTERED

## 2023-03-16 PROCEDURE — 2500000003 HC RX 250 WO HCPCS: Performed by: NURSE ANESTHETIST, CERTIFIED REGISTERED

## 2023-03-16 PROCEDURE — 3430000000 HC RX DIAGNOSTIC RADIOPHARMACEUTICAL: Performed by: SURGERY

## 2023-03-16 RX ORDER — SODIUM CHLORIDE 0.9 % (FLUSH) 0.9 %
5-40 SYRINGE (ML) INJECTION PRN
Status: DISCONTINUED | OUTPATIENT
Start: 2023-03-16 | End: 2023-03-16 | Stop reason: HOSPADM

## 2023-03-16 RX ORDER — ONDANSETRON 2 MG/ML
4 INJECTION INTRAMUSCULAR; INTRAVENOUS
Status: COMPLETED | OUTPATIENT
Start: 2023-03-16 | End: 2023-03-16

## 2023-03-16 RX ORDER — MAGNESIUM HYDROXIDE 1200 MG/15ML
LIQUID ORAL PRN
Status: DISCONTINUED | OUTPATIENT
Start: 2023-03-16 | End: 2023-03-16 | Stop reason: ALTCHOICE

## 2023-03-16 RX ORDER — MEPERIDINE HYDROCHLORIDE 25 MG/ML
12.5 INJECTION INTRAMUSCULAR; INTRAVENOUS; SUBCUTANEOUS EVERY 5 MIN PRN
Status: DISCONTINUED | OUTPATIENT
Start: 2023-03-16 | End: 2023-03-16 | Stop reason: HOSPADM

## 2023-03-16 RX ORDER — DEXAMETHASONE SODIUM PHOSPHATE 4 MG/ML
INJECTION, SOLUTION INTRA-ARTICULAR; INTRALESIONAL; INTRAMUSCULAR; INTRAVENOUS; SOFT TISSUE PRN
Status: DISCONTINUED | OUTPATIENT
Start: 2023-03-16 | End: 2023-03-16 | Stop reason: SDUPTHER

## 2023-03-16 RX ORDER — PROPOFOL 10 MG/ML
INJECTION, EMULSION INTRAVENOUS PRN
Status: DISCONTINUED | OUTPATIENT
Start: 2023-03-16 | End: 2023-03-16 | Stop reason: SDUPTHER

## 2023-03-16 RX ORDER — ONDANSETRON 2 MG/ML
INJECTION INTRAMUSCULAR; INTRAVENOUS PRN
Status: DISCONTINUED | OUTPATIENT
Start: 2023-03-16 | End: 2023-03-16 | Stop reason: SDUPTHER

## 2023-03-16 RX ORDER — FENTANYL CITRATE 0.05 MG/ML
50 INJECTION, SOLUTION INTRAMUSCULAR; INTRAVENOUS EVERY 5 MIN PRN
Status: COMPLETED | OUTPATIENT
Start: 2023-03-16 | End: 2023-03-16

## 2023-03-16 RX ORDER — FENTANYL CITRATE 0.05 MG/ML
25 INJECTION, SOLUTION INTRAMUSCULAR; INTRAVENOUS EVERY 5 MIN PRN
Status: COMPLETED | OUTPATIENT
Start: 2023-03-16 | End: 2023-03-16

## 2023-03-16 RX ORDER — BUPIVACAINE HYDROCHLORIDE AND EPINEPHRINE 5; 5 MG/ML; UG/ML
INJECTION, SOLUTION EPIDURAL; INTRACAUDAL; PERINEURAL PRN
Status: DISCONTINUED | OUTPATIENT
Start: 2023-03-16 | End: 2023-03-16 | Stop reason: SDUPTHER

## 2023-03-16 RX ORDER — SODIUM CHLORIDE 9 MG/ML
INJECTION, SOLUTION INTRAVENOUS PRN
Status: DISCONTINUED | OUTPATIENT
Start: 2023-03-16 | End: 2023-03-16 | Stop reason: HOSPADM

## 2023-03-16 RX ORDER — MIDAZOLAM HYDROCHLORIDE 1 MG/ML
INJECTION INTRAMUSCULAR; INTRAVENOUS PRN
Status: DISCONTINUED | OUTPATIENT
Start: 2023-03-16 | End: 2023-03-16 | Stop reason: SDUPTHER

## 2023-03-16 RX ORDER — SODIUM CHLORIDE 0.9 % (FLUSH) 0.9 %
5-40 SYRINGE (ML) INJECTION EVERY 12 HOURS SCHEDULED
Status: DISCONTINUED | OUTPATIENT
Start: 2023-03-16 | End: 2023-03-16 | Stop reason: HOSPADM

## 2023-03-16 RX ORDER — SCOLOPAMINE TRANSDERMAL SYSTEM 1 MG/1
1 PATCH, EXTENDED RELEASE TRANSDERMAL ONCE
Status: DISCONTINUED | OUTPATIENT
Start: 2023-03-16 | End: 2023-03-16 | Stop reason: HOSPADM

## 2023-03-16 RX ORDER — LIDOCAINE HYDROCHLORIDE 10 MG/ML
INJECTION, SOLUTION EPIDURAL; INFILTRATION; INTRACAUDAL; PERINEURAL PRN
Status: DISCONTINUED | OUTPATIENT
Start: 2023-03-16 | End: 2023-03-16 | Stop reason: SDUPTHER

## 2023-03-16 RX ORDER — PROCHLORPERAZINE EDISYLATE 5 MG/ML
5 INJECTION INTRAMUSCULAR; INTRAVENOUS
Status: DISCONTINUED | OUTPATIENT
Start: 2023-03-16 | End: 2023-03-16 | Stop reason: HOSPADM

## 2023-03-16 RX ADMIN — MIDAZOLAM HYDROCHLORIDE 2 MG: 1 INJECTION, SOLUTION INTRAMUSCULAR; INTRAVENOUS at 10:09

## 2023-03-16 RX ADMIN — PHENYLEPHRINE HYDROCHLORIDE 100 MCG: 10 INJECTION INTRAVENOUS at 11:55

## 2023-03-16 RX ADMIN — VANCOMYCIN HYDROCHLORIDE 1500 MG: 1.5 INJECTION, POWDER, LYOPHILIZED, FOR SOLUTION INTRAVENOUS at 09:35

## 2023-03-16 RX ADMIN — ONDANSETRON 4 MG: 2 INJECTION INTRAMUSCULAR; INTRAVENOUS at 13:43

## 2023-03-16 RX ADMIN — TILMANOCEPT 1.9 MILLICURIE: KIT at 10:35

## 2023-03-16 RX ADMIN — PHENYLEPHRINE HYDROCHLORIDE 100 MCG: 10 INJECTION INTRAVENOUS at 12:01

## 2023-03-16 RX ADMIN — SODIUM CHLORIDE 1000 ML: 9 INJECTION, SOLUTION INTRAVENOUS at 09:36

## 2023-03-16 RX ADMIN — SODIUM CHLORIDE: 9 INJECTION, SOLUTION INTRAVENOUS at 12:00

## 2023-03-16 RX ADMIN — PROPOFOL 200 MG: 10 INJECTION, EMULSION INTRAVENOUS at 11:05

## 2023-03-16 RX ADMIN — FENTANYL CITRATE 25 MCG: 0.05 INJECTION, SOLUTION INTRAMUSCULAR; INTRAVENOUS at 13:27

## 2023-03-16 RX ADMIN — LIDOCAINE HYDROCHLORIDE 40 MG: 10 INJECTION, SOLUTION EPIDURAL; INFILTRATION; INTRACAUDAL; PERINEURAL at 11:05

## 2023-03-16 RX ADMIN — FENTANYL CITRATE 50 MCG: 0.05 INJECTION, SOLUTION INTRAMUSCULAR; INTRAVENOUS at 12:46

## 2023-03-16 RX ADMIN — BUPIVACAINE HYDROCHLORIDE AND EPINEPHRINE BITARTRATE 30 ML: 5; .005 INJECTION, SOLUTION EPIDURAL; INTRACAUDAL; PERINEURAL at 10:13

## 2023-03-16 RX ADMIN — PHENYLEPHRINE HYDROCHLORIDE 100 MCG: 10 INJECTION INTRAVENOUS at 12:05

## 2023-03-16 RX ADMIN — FENTANYL CITRATE 50 MCG: 0.05 INJECTION, SOLUTION INTRAMUSCULAR; INTRAVENOUS at 13:04

## 2023-03-16 RX ADMIN — ONDANSETRON 4 MG: 2 INJECTION INTRAMUSCULAR; INTRAVENOUS at 11:59

## 2023-03-16 RX ADMIN — DEXAMETHASONE SODIUM PHOSPHATE 8 MG: 4 INJECTION, SOLUTION INTRAMUSCULAR; INTRAVENOUS at 11:20

## 2023-03-16 RX ADMIN — FENTANYL CITRATE 25 MCG: 0.05 INJECTION, SOLUTION INTRAMUSCULAR; INTRAVENOUS at 13:17

## 2023-03-16 ASSESSMENT — PAIN SCALES - GENERAL
PAINLEVEL_OUTOF10: 4
PAINLEVEL_OUTOF10: 5
PAINLEVEL_OUTOF10: 5
PAINLEVEL_OUTOF10: 0
PAINLEVEL_OUTOF10: 4

## 2023-03-16 ASSESSMENT — PAIN DESCRIPTION - DESCRIPTORS
DESCRIPTORS: BURNING
DESCRIPTORS: BURNING

## 2023-03-16 ASSESSMENT — PAIN DESCRIPTION - LOCATION
LOCATION: CHEST
LOCATION: CHEST

## 2023-03-16 ASSESSMENT — PAIN DESCRIPTION - FREQUENCY: FREQUENCY: CONTINUOUS

## 2023-03-16 ASSESSMENT — PAIN DESCRIPTION - PAIN TYPE: TYPE: SURGICAL PAIN

## 2023-03-16 ASSESSMENT — PAIN - FUNCTIONAL ASSESSMENT: PAIN_FUNCTIONAL_ASSESSMENT: PREVENTS OR INTERFERES SOME ACTIVE ACTIVITIES AND ADLS

## 2023-03-16 ASSESSMENT — PAIN DESCRIPTION - ORIENTATION
ORIENTATION: RIGHT
ORIENTATION: RIGHT

## 2023-03-16 ASSESSMENT — PAIN DESCRIPTION - ONSET: ONSET: ON-GOING

## 2023-03-16 NOTE — H&P
UPDATED HISTORY AND PHYSICAL EXAMINATION    SERVICE DATE:  3/16/2023   SERVICE TIME:  7:48 AM    PHYSICAL EXAM MUST BE COMPLETED ON ADMISSION    The History and Physical (completed in the past 30 days) has been reviewed and the patient has been examined. The contents accurately reflect the patient's condition with the following additions or revisions since the H&P was completed. Examination indicates no changes. This H&P can be found in the Epic. The patient was counseled at length about the risks of naresh Covid-19 during their perioperative period and any recovery window from their procedure. The patient was made aware that naresh Covid-19  may worsen their prognosis for recovering from their procedure  and lend to a higher morbidity and/or mortality risk. All material risks, benefits, and reasonable alternatives including postponing the procedure were discussed. The patient does wish to proceed with the procedure at this time.     SIGNATURE: Vale Kraus MD PATIENT NAME: Junior March   DATE: 3/16/23 MRN: [id number]   TIME: 7:48 AM EDT PHONE: [phone]

## 2023-03-16 NOTE — ANESTHESIA POSTPROCEDURE EVALUATION
Department of Anesthesiology  Postprocedure Note    Patient: Rocio Garcia  MRN: [id number]  YOB: 1955  Date of evaluation: 3/16/2023      Procedure Summary     Date: 03/16/23 Room / Location: [address]    Anesthesia Start: 1101 Anesthesia Stop: 2931    Procedure: RIGHT BREAST LUMPECTOMY & SENTINEL LYMPH NODE BIOPSY (Right: Breast) Diagnosis:       Malignant neoplasm of upper-outer quadrant of right female breast ([address]      (Malignant neoplasm of upper-outer quadrant of right female breast ([address] Meghann Marie)    Surgeons: Josefa Ramos MD Responsible Provider: Renny Blanton MD    Anesthesia Type: general, regional ASA Status: 2          Anesthesia Type: No value filed.     Vanessa Phase I: Vanessa Score: 10    Vanessa Phase II:        Anesthesia Post Evaluation    Patient location during evaluation: PACU  Patient participation: complete - patient participated  Level of consciousness: awake  Pain score: 0  Airway patency: patent  Nausea & Vomiting: no nausea and no vomiting  Complications: no  Cardiovascular status: hemodynamically stable  Respiratory status: acceptable  Hydration status: stable

## 2023-03-16 NOTE — ANESTHESIA PROCEDURE NOTES
Peripheral Block    Patient location during procedure: pre-op  Reason for block: post-op pain management and at surgeon's request  Start time: 3/16/2023 10:10 AM  End time: 3/16/2023 10:16 AM  Staffing  Performed: anesthesiologist   Anesthesiologist: Maranda Griffith MD  Preanesthetic Checklist  Completed: patient identified, IV checked, site marked, risks and benefits discussed, surgical/procedural consents, equipment checked, pre-op evaluation, timeout performed, anesthesia consent given, oxygen available and monitors applied/VS acknowledged  Peripheral Block   Patient position: supine  Prep: ChloraPrep  Provider prep: mask and sterile gloves (Sterile probe cover)  Patient monitoring: cardiac monitor, continuous pulse ox, frequent blood pressure checks and IV access  Block type: PECS I and PECS II  Laterality: right  Injection technique: single-shot  Guidance: ultrasound guided  Local infiltration: bupivacaine  Infiltration strength: 0.5 %  Local infiltration: bupivacaine  Dose: 30 mL    Needle   Needle type: combined needle/nerve stimulator   Needle gauge: 21 G  Needle localization: anatomical landmarks and ultrasound guidance  Needle length: 10 cm  Assessment   Injection assessment: negative aspiration for heme, no paresthesia on injection and local visualized surrounding nerve on ultrasound  Paresthesia pain: immediately resolved  Slow fractionated injection: yes  Hemodynamics: stable  Real-time US image taken/store: yes    Additional Notes  Ultrasound image printed and saved in patient chart.     Sterile probe cover used

## 2023-03-16 NOTE — DISCHARGE INSTRUCTIONS
Shower in 48 hours. Resume home meds. May drive in 1 week. Keep bra for 24 hours. Remove if uncomfortable. May take Motrin after 24 hours    Lumpectomy: What to Expect at [facility]     Breast-conserving surgery (lumpectomy) removes the cancer and just enough tissue to get all the cancer. For 1 or 2 days after the surgery, you will probably feel tired and have some pain. The skin around the cut (incision) may feel firm, swollen, and tender, and be bruised. Tenderness should go away in about 2 or 3 days, and the bruising within 2 weeks. Firmness and swelling may last for 3 to 6 months. You may feel a soft lump in your breast that gradually turns hard. This is the incision healing. It is not cancer. Women should wear a well-fitted and supportive bra, even during the night, for 1 week. You will probably be able to go back to work or your normal routine in 1 to 3 weeks after the surgery. This may depend on whether you have more treatment. Your doctor may have removed some lymph nodes in your armpit to see if the cancer has spread. If so, you may feel either numbness or tingling (\"pins and needles\") in your armpit or on the inside of your upper arm. This should improve over the next several weeks. Some people have numbness for a longer time. When you find out that you have cancer, you may feel many emotions and may need some help coping. Seek out family, friends, and counselors for support. You also can do things at home to make yourself feel better while you go through treatment. Call the [facility] ([phone]) or visit its website at [address] NONO for more information. This care sheet gives you a general idea about how long it will take for you to recover. But each person recovers at a different pace. Follow the steps below to get better as quickly as possible. How can you care for yourself at home? Activity    Rest when you feel tired. Getting enough sleep will help you recover. You may want to sleep on the side that has not been operated on. A woman may want to use a pillow to support the affected breast while lying on her side. Avoid strenuous activities, such as biking, jogging, weightlifting, or aerobic exercise, for 1 month or until your doctor says it is okay. This may include housework, such as washing windows, especially if you have to use the arm next to the affected breast.     Most people can return to their normal activities within 2 weeks. Try to walk each day. Start out by walking a little more than you did the day before. Bit by bit, increase the amount you walk. Walking boosts blood flow and helps prevent pneumonia and constipation. For 1 to 2 weeks, avoid lifting anything over 10 to 15 pounds or that would make you strain. This may include heavy grocery bags and milk containers, a heavy briefcase or backpack, cat litter or dog food bags, a vacuum , or a child. You may drive when you are no longer taking pain medicine and can use your arm without pain. Talk to your doctor about when to start driving, especially if you are having radiation treatments. You will probably be able to go back to work or your normal routine in 1 to 3 weeks. It may be longer, depending on the type of work you do and whether you are having radiation or chemotherapy. You may shower 24 to 48 hours after surgery, if your doctor okays it. Pat the incision dry. Do not take a bath for the first 2 weeks, or until your doctor tells you it is okay. Diet    You can eat your normal diet. If your stomach is upset, try bland, low-fat foods like plain rice, broiled chicken, toast, and yogurt. You may notice that your bowel movements are not regular right after your surgery. This is common. Try to avoid constipation and straining with bowel movements. You may want to take a fiber supplement every day.  If you have not had a bowel movement after a couple of days, ask your doctor about taking a mild laxative. Medicines    Your doctor will tell you if and when you can restart your medicines. He or she will also give you instructions about taking any new medicines. If you take aspirin or some other blood thinner, ask your doctor if and when to start taking it again. Make sure that you understand exactly what your doctor wants you to do. Take pain medicines exactly as directed. Your doctor may have given you a medicine to numb the area inside and around your cut (incision). The numbness will last from 6 to 12 hours. If you went home right after the surgery, you may want to take pain medicine before this wears off. If the doctor gave you a prescription medicine for pain, take it as prescribed. If you are not taking a prescription pain medicine, ask your doctor if you can take an over-the-counter medicine. If your doctor prescribed antibiotics, take them as directed. Do not stop taking them just because you feel better. You need to take the full course of antibiotics. If you think your pain medicine is making you sick to your stomach: Take your medicine after meals (unless your doctor has told you not to). Ask your doctor for a different pain medicine. Incision care    If you have strips of tape on the cut the doctor made (incision), leave the tape on for a week or until it falls off. When you can shower, wash the area daily with warm, soapy water and pat it dry. Follow-up care is a key part of your treatment and safety. Be sure to make and go to all appointments, and call your doctor if you are having problems. It's also a good idea to know your test results and keep a list of the medicines you take. When should you call for help? Call 911 anytime you think you may need emergency care. For example, call if:    You passed out (lost consciousness). You have chest pain, are short of breath, or cough up blood.    Call your doctor now or seek immediate medical care if:    You are sick to your stomach or cannot drink fluids. You cannot pass stools or gas. You have pain that does not get better after you take your pain medicine. You have loose stitches, or your incision comes open. Bright red blood has soaked through the bandage over your incision. You have signs of a blood clot in your leg (called a deep vein thrombosis), such as:  Pain in your calf, back of the knee, thigh, or groin. Redness or swelling in your leg. You have signs of infection, such as: Increased pain, swelling, warmth, or redness. Red streaks leading from the incision. Pus draining from the incision. A fever. Watch closely for changes in your health, and be sure to contact your doctor if:    You have any problems. You have new or worse swelling or pain in your arm. Where can you learn more? Go to https://ApplikapeMingleboxeweb.Snappy shuttle. org and sign in to your Compliance 360 account. Enter D222 in the Derceto box to learn more about \"Lumpectomy: What to Expect at Home. \"     If you do not have an account, please click on the \"Sign Up Now\" link. Current as of: December 17, 2020               Content Version: 12.8  © 2006-2021 Healthwise, [employer]. Care instructions adapted under license by [facility] ([facility]). If you have questions about a medical condition or this instruction, always ask your healthcare professional. [place] [address] any warranty or liability for your use of this information. [place] Node Biopsy for Breast Cancer: What to Expect at Home  Your Recovery  After a sentinel node biopsy, many people have no side effects. Some people have pain or bruising at the cut (incision) and feel tired. Your breast and underarm area may be slightly swollen. This may last a few days. You should feel close to normal in a few days. The incision the doctor made usually heals in about 2 weeks. The scar usually fades with time.   Some people have a buildup of fluid in the area where the lymph nodes were removed. This is known as seroma. This goes away on its own, or your doctor can drain it. When you had this test, your doctor injected blue dye or radioactive material (or both) into your breast. The blue dye may give your breast a bluish color and turn your urine green for about 24 hours. The radioactive material leaves the body on its own in 24 to 48 hours. A sentinel node biopsy may be done at the same time as other breast surgeries. If this is the case, how you recover will be different. This care sheet gives you a general idea about how long it will take for you to recover. But each person recovers at a different pace. Follow the steps below to get better as quickly as possible. How can you care for yourself at home? Activity    Rest when you feel tired. Getting enough sleep will help you recover. Try to walk each day. Start by walking a little more than you did the day before. Bit by bit, increase the amount you walk. Walking boosts blood flow and helps prevent pneumonia and constipation. You may drive when you are no longer taking pain medicine and you feel up to it. You can lift things when you feel comfortable doing so. Most women return to work and their normal routines in 2 to 7 days. You may shower 24 to 48 hours after surgery, if your doctor okays it. Pat the incision dry. Do not take a bath for the first 2 weeks, or until your doctor tells you it is okay. Avoid activity or exercise that may put stress on the cut. This includes washing windows, vacuuming, or gardening with the affected arm. Diet    You can eat your normal diet. If your stomach is upset, try bland, low-fat foods like plain rice, broiled chicken, toast, and yogurt. You may notice that your bowels are not regular right after your surgery. This is common. Try to avoid constipation and straining with bowel movements.  Take a fiber supplement such as Citrucel or Metamucil every day. If you have not had a bowel movement after a couple of days, take a mild laxative. Medicines    Your doctor will tell you if and when you can restart your medicines. He or she will also give you instructions about taking any new medicines. If you take aspirin or some other blood thinner, ask your doctor if and when to start taking it again. Make sure that you understand exactly what your doctor wants you to do. Take pain medicines exactly as directed. If the doctor gave you a prescription medicine for pain, take it as prescribed. If you are not taking a prescription pain medicine, take an over-the-counter medicine such as acetaminophen (Tylenol), ibuprofen (Advil, Motrin), or naproxen (Aleve). Read and follow all instructions on the label. Do not take two or more pain medicines at the same time unless the doctor told you to. Many pain medicines have acetaminophen, which is Tylenol. Too much acetaminophen (Tylenol) can be harmful. If your doctor prescribed antibiotics, take them as directed. Do not stop taking them just because you feel better. You need to take the full course of antibiotics. If you think your pain medicine is making you sick to your stomach: Take your medicine after meals (unless your doctor has told you not to). Ask your doctor for a different pain medicine. Incision care    If you have strips of tape on the cut (incision) the doctor made, leave the tape on for about 1 week or until it falls off. After you can shower, wash the area daily with warm, soapy water and pat it dry. Follow-up care is a key part of your treatment and safety. Be sure to make and go to all appointments, and call your doctor if you are having problems. It's also a good idea to know your test results and keep a list of the medicines you take. When should you call for help? Call 911 anytime you think you may need emergency care.  For example, call if:    You passed out (lost consciousness). You have chest pain, are short of breath, or cough up blood. Call your doctor now or seek immediate medical care if:    You have pain that does not get better after you take pain medicine. You cannot pass stools or gas. You are sick to your stomach or cannot drink fluids. You have signs of a blood clot in your leg (called a deep vein thrombosis), such as:  Pain in your calf, back of the knee, thigh, or groin. Redness or swelling in your leg. You have signs of infection, such as: Increased pain, swelling, warmth, or redness. Red streaks leading from the incision. Pus draining from the incision. A fever. You have loose stitches, or your incision comes open. Bright red blood has soaked through the bandage over your incision. Watch closely for changes in your health, and be sure to contact your doctor if:    You have any problems. You have new or worse swelling or pain in your arm. Where can you learn more? Go to https://InTuun Systems.Chilltime. org and sign in to your Optinuity account. Enter 937 1194 in the Flogs.com box to learn more about \"[place] Node Biopsy for Breast Cancer: What to Expect at Home. \"     If you do not have an account, please click on the \"Sign Up Now\" link. Current as of: December 17, 2020               Content Version: 12.8  © 6638-2364 Healthwise, Incorporated. Care instructions adapted under license by [facility] ([facility]). If you have questions about a medical condition or this instruction, always ask your healthcare professional. [place] [address] any warranty or liability for your use of this information.

## 2023-03-16 NOTE — ANESTHESIA PRE PROCEDURE
Department of Anesthesiology  Preprocedure Note       Name:  Iwona Mejia   Age:  69 y.o.  :  1955                                          MRN:  [id number]         Date:  3/16/2023      Surgeon: Nelli Ortiz):  Malika Zaidi MD    Procedure: Procedure(s):  RIGHT BREAST LUMPECTOMY & SENTINEL LYMPH NODE BIOPSY    Medications prior to admission:   Prior to Admission medications    Medication Sig Start Date End Date Taking? Authorizing Provider   NONFORMULARY Baby Leah (OTC used for vertigo)    Historical Provider, MD   Cyanocobalamin (VITAMIN B-12 PO) Take by mouth    Historical Provider, MD   Semaglutide (RYBELSUS) 14 MG TABS Take by mouth    Historical Provider, MD   pravastatin (PRAVACHOL) 10 MG tablet Take 1 tablet by mouth daily 22   Historical Provider, MD   Naproxen Sodium 220 MG CAPS Take 1 tablet by mouth every 12 hours as needed 21   Historical Provider, MD   clobetasol prop emollient base 0.05 % CREA Apply 1 Dose topically daily as needed 10/28/19   Historical Provider, MD   cetirizine (ZYRTEC) 10 MG tablet Take 1 tablet by mouth daily as needed 20   Historical Provider, MD   Famotidine (PEPCID PO) Take 1 tablet by mouth as needed    Historical Provider, MD   vitamin D (CHOLECALCIFEROL) 1000 UNIT TABS tablet Take 1,000 Int'l Units by mouth daily.       Historical Provider, MD       Current medications:    Current Facility-Administered Medications   Medication Dose Route Frequency Provider Last Rate Last Admin    sodium chloride flush 0.9 % injection 5-40 mL  5-40 mL IntraVENous 2 times per day Malika Zaidi MD        sodium chloride flush 0.9 % injection 5-40 mL  5-40 mL IntraVENous PRN Malika Zaidi MD        0.9 % sodium chloride infusion   IntraVENous PRN Malika Zaidi  mL/hr at 23 0936 1,000 mL at 23 0936    vancomycin (VANCOCIN) 1,500 mg in sodium chloride 0.9 % 500 mL IVPB (ADDAVIAL)  1,500 mg IntraVENous On Call to [address] [place] [address], .3 mL/hr at 23 0935 1,500 mg at 03/16/23 0935     Facility-Administered Medications Ordered in Other Encounters   Medication Dose Route Frequency Provider Last Rate Last Admin    technetium Tc 99m tilmanocept (LYMPHOSEEK) injection 2 millicurie  2 millicurie IntraDERmal ONCE PRN Maria Iniguez MD           Allergies: Allergies   Allergen Reactions    Amoxicillin     Amoxicillin-Pot Clavulanate     Aspirin     Cephalexin     Codeine     Corticosteroids Other (See Comments)     Has issues with Injection only    Darvocet [Propoxyphene N-Acetaminophen]     Helidac [Metronid-Tetracyc-Bis Subsal]     Meclizine     Metoclopramide     Nizatidine     Sitagliptin Itching    Trimox [Amoxicillin Trihydrate]     Zithromax [Azithromycin Dihydrate]     Adhesive Tape Rash    Metformin Rash    Miconazole-Zinc Oxide-Petrolat Rash    Nickel Rash    Other Itching and Rash     Ace wrap/ Ace bandage    Penicillins Nausea And Vomiting       Problem List:    Patient Active Problem List   Diagnosis Code    Adenomatous polyp of colon D12.6    Adenomatous polyp of ascending colon D12.2    Adenomatous polyp of transverse colon D12.3    Weakness R53.1    Vitamin D deficiency E55.9    Type 2 diabetes mellitus (HCC) E11.9    Tinea corporis B35.4    Strain of shoulder S46.919A    Primary localized osteoarthrosis of ankle and foot M19.079    Primary dysthymia F34.1    Pain in limb M79.609    Osteopenia M85.80    Obesity (BMI 35.0-39.9 without comorbidity) E66.9    Injury of wrist S69.90XA    Hyperlipidemia E78.5    Hyperglycemia R73.9    Contusion of face S00.83XA    Combined forms of age-related cataract of both eyes H25.813    Cobalamin deficiency E53.8    Calcaneal spur C75.51    Biliary colic P80.52    Atopic dermatitis L20.9    Allergic rhinitis J30.9    Abnormal mammogram R92.8    Accidental fall W19. Katheryne Prader    Abnormal mammogram of right breast R92.8    Mass of upper outer quadrant of right breast N63.11    Obesity, Class II, BMI 35-39.9 E66.9    Carcinoma of upper-outer quadrant of right breast in female, estrogen receptor positive ([facility] [address] C50.411, Z17.0       Past Medical History:        Diagnosis Date    AC (acromioclavicular) joint bone spurs     spine, R ankle    Adenomatous polyp of colon     Arthritis     Bulging lumbar disc     Cancer (HCC)     right breast cancer    Colon polyp     Diabetes mellitus ([facility] [address]     Disc degeneration, lumbar     DJD (degenerative joint disease)     knees    Esophageal polyp     Fibromyalgia     Hyperlipidemia     Megaloblastic anemia due to decreased intake of vitamin B12     Ovarian cyst, right     PONV (postoperative nausea and vomiting)     Tendonitis     R ankle    Vitamin D deficiency        Past Surgical History:        Procedure Laterality Date    ABSCESS DRAINAGE      rectum    BREAST BIOPSY      right breast     COLONOSCOPY  14,    polypectomy tracy    COLONOSCOPY N/A 2021    COLORECTAL CANCER SCREENING, HIGH RISK with polypectomies  performed by Linda Gracia MD at [address] [place] [address]      bilateral cataract surgery    FULGURATION MINOR BLADDER LESION (W OR W/O BIOPSY)      FULGURATION MINOR BLADDER LESION (W OR W/O BIOPSY)      SKIN BIOPSY      stomach and back of neck-negative for cancer    UPPER GASTROINTESTINAL ENDOSCOPY  2011    bx polypectomy tracy       Social History:    Social History     Tobacco Use    Smoking status: Former     Packs/day: 0.50     Years: 5.00     Pack years: 2.50     Types: Cigarettes     Quit date: 1970     Years since quittin.2    Smokeless tobacco: Never   Substance Use Topics    Alcohol use: Not Currently                                Counseling given: Not Answered      Vital Signs (Current):   Vitals:    23 0727 23 0745   BP: (!) 191/82 (!) 165/72   Pulse: 85    Resp: 16    Temp: 96.9 °F (36.1 °C) TempSrc: Temporal    SpO2: 98%                                               BP Readings from Last 3 Encounters:   03/16/23 (!) 165/72   03/09/23 (!) 150/67   02/27/23 136/80       NPO Status: Time of last liquid consumption: 1830                        Time of last solid consumption: 1700                        Date of last liquid consumption: 03/15/23                        Date of last solid food consumption: 03/15/23    BMI:   Wt Readings from Last 3 Encounters:   03/09/23 247 lb 6.4 oz (112.2 kg)   02/27/23 247 lb (112 kg)   02/13/23 244 lb (110.7 kg)     There is no height or weight on file to calculate BMI.    CBC:   Lab Results   Component Value Date/Time    WBC 4.6 03/09/2023 03:01 PM    RBC 4.48 03/09/2023 03:01 PM    HGB 14.5 03/09/2023 03:01 PM    HCT 43.6 03/09/2023 03:01 PM    MCV 97.5 03/09/2023 03:01 PM    RDW 13.7 03/09/2023 03:01 PM     03/09/2023 03:01 PM       CMP:   Lab Results   Component Value Date/Time     03/09/2023 03:01 PM    K 4.0 03/09/2023 03:01 PM    CL 99 03/09/2023 03:01 PM    CO2 27 03/09/2023 03:01 PM    BUN 11 03/09/2023 03:01 PM    CREATININE 0.72 03/09/2023 03:01 PM    LABGLOM >60.0 03/09/2023 03:01 PM    GLUCOSE 211 03/09/2023 03:01 PM    CALCIUM 9.5 03/09/2023 03:01 PM       POC Tests:   Recent Labs     03/16/23  0749   POCGLU 176*       Coags: No results found for: PROTIME, INR, APTT    HCG (If Applicable): No results found for: PREGTESTUR, PREGSERUM, HCG, HCGQUANT     ABGs: No results found for: PHART, PO2ART, PGM8WGZ, QFX8VFA, BEART, I3GMAJST     Type & Screen (If Applicable):  No results found for: LABABO, LABRH    Drug/Infectious Status (If Applicable):  No results found for: HIV, HEPCAB    COVID-19 Screening (If Applicable):   Lab Results   Component Value Date/Time    COVID19 Not Detected 08/13/2021 03:52 PM           Anesthesia Evaluation     history of anesthetic complications: PONV.   Airway: Mallampati: II  TM distance: >3 FB   Neck ROM: full  Mouth opening: > = 3 FB   Dental:          Pulmonary:Negative Pulmonary ROS breath sounds clear to auscultation                             Cardiovascular:Negative CV ROS            Rhythm: regular                      Neuro/Psych:   (+) neuromuscular disease:, psychiatric history:            GI/Hepatic/Renal:   (+) GERD: well controlled, morbid obesity          Endo/Other:    (+) DiabetesType II DM, , malignancy/cancer. Abdominal:             Vascular: negative vascular ROS. Other Findings:           Anesthesia Plan      general and regional     ASA 2     (LMA  US guided PECS I & II  PONV prophylaxis, SCOP patch)  Induction: intravenous. MIPS: Prophylactic antiemetics administered. Anesthetic plan and risks discussed with patient. Use of blood products discussed with patient whom consented to blood products.    Plan discussed with CRNA and surgical team.    Attending anesthesiologist reviewed and agrees with Preprocedure content      Post-op pain plan if not by surgeon: single peripheral nerve block            Evander Kocher, MD   3/16/2023

## 2023-03-17 ENCOUNTER — TELEPHONE (OUTPATIENT)
Dept: SURGERY | Age: 68
End: 2023-03-17

## 2023-03-17 NOTE — TELEPHONE ENCOUNTER
I called the patient post Right Breast Lumpectomy and SLNB. The patient verbalized that she had nausea and vomiting yesterday after surgery because of the pain medication. The patient verbalized that the nausea is better today and she has not had any emesis today. The patient verbalized that she is eating broth today and taking fluids well. The patient denies pain and verbalized that the surgical bra is dry and intact. The patient has no questions at this time. I verified the patient's post operative appointment date/time/location.

## 2023-03-20 NOTE — OP NOTE
flap was preserved. The tissue that was mobilized was 3.5 cm and the pedicule was sutured in place with a 3-0 vicryl. The wound bed was irrigated, inspected for hemostasis, closed with interrupted 3-0 Vicryl and running 4-0 Monocryl suture, covered by 4 x 4's, paper tape, and a surgical bra. The patient tolerated the procedure well and was transferred to the recovery room in stable condition. I performed this procedure with a surgical assistant. CRISTAL OPERATIVE STANDARDS    Operation performed with curative intent: Yes  Tracer(s) used to identify sentinel nodes in the upfront surgery (nonneoadjuvant) setting (select all that apply) : Dye and Radioactive tracer  Tracer(s) used to identify sentinel nodes in the neoadjuvant setting (select all that apply): Not Applicable  All nodes (colored or noncolored) present at the end of a dye-filled lymphatic channel were removed: Yes  All significantly radioactive nodes were removed:  Yes  All palpably suspicious nodes were removed: Yes  Biopsy-proven positive nodes marked with clips prior to chemotherapy were identified and removed: Not Applicable    SIGNATURE: Rocio Dowling MD PATIENT NAME: Allie Clayton   DATE: 3/20/23 MRN: [id number]   TIME: 8:13 AM EDT PHONE: [phone]

## 2023-03-21 ENCOUNTER — TELEPHONE (OUTPATIENT)
Dept: SURGERY | Age: 68
End: 2023-03-21

## 2023-03-21 NOTE — TELEPHONE ENCOUNTER
----- Message from Evin Aguilera MD sent at 3/20/2023  8:17 AM EDT -----  Regarding: call with [facility] genetics    ----- Message -----  From: [address] [place] [address]  Sent: 3/6/2023   9:51 PM EDT  To: Evin Aguilera MD

## 2023-03-21 NOTE — TELEPHONE ENCOUNTER
Patient was informed that her genetic test results are negative for a deleterious mutation. A negative test result does not mean risk for breast cancer is not elevated. Her risk may still be elevated and continue the current treatment / follow up plan as previously recommended by Dr. Howell Phoenix. She will receive a copy of the test results in the mail. In the information packet, there is information to contact a genetic counselor. This is free of charge and Dr. Howell Phoenix highly recommends all her patients to contact them. Please call the office at [phone] with any questions.

## 2023-03-23 ENCOUNTER — TELEPHONE (OUTPATIENT)
Dept: SURGERY | Age: 68
End: 2023-03-23

## 2023-03-23 NOTE — TELEPHONE ENCOUNTER
----- Message from Romina Brunson MD sent at 3/23/2023  8:26 AM EDT -----  Regarding: call with clear margins    ----- Message -----  From: Gerry Gurrola Incoming Lab Results From Soft  Sent: 3/22/2023   5:31 PM EDT  To: Romina Brunson MD

## 2023-03-23 NOTE — TELEPHONE ENCOUNTER
I informed the patient of her Right Breast Lumpectomy and SLNB clear margins. The patient verbalized understanding of her clear surgical margins. The patient has no questions or concerns at this time. I verified the patient's post operative appointment date/time/location.

## 2023-03-29 ENCOUNTER — OFFICE VISIT (OUTPATIENT)
Dept: SURGERY | Age: 68
End: 2023-03-29

## 2023-03-29 VITALS
BODY MASS INDEX: 38.92 KG/M2 | HEART RATE: 87 BPM | RESPIRATION RATE: 16 BRPM | WEIGHT: 248 LBS | OXYGEN SATURATION: 99 % | HEIGHT: 67 IN | TEMPERATURE: 97.1 F | SYSTOLIC BLOOD PRESSURE: 148 MMHG | DIASTOLIC BLOOD PRESSURE: 72 MMHG

## 2023-03-29 DIAGNOSIS — C50.411 CARCINOMA OF UPPER-OUTER QUADRANT OF RIGHT BREAST IN FEMALE, ESTROGEN RECEPTOR POSITIVE (HCC): ICD-10-CM

## 2023-03-29 DIAGNOSIS — N64.9 BREAST DISORDER: Primary | ICD-10-CM

## 2023-03-29 DIAGNOSIS — Z17.0 CARCINOMA OF UPPER-OUTER QUADRANT OF RIGHT BREAST IN FEMALE, ESTROGEN RECEPTOR POSITIVE (HCC): ICD-10-CM

## 2023-03-29 PROCEDURE — 99024 POSTOP FOLLOW-UP VISIT: CPT | Performed by: SURGERY

## 2023-03-29 NOTE — PATIENT INSTRUCTIONS
Patient Education        Preventing Lymphedema After Treatment for Breast Cancer: Care Instructions  Your Care Instructions     Lymphedema is a buildup of fluid in the soft tissues of the body. It can happen in the arm after breast cancer surgery to remove lymph nodes. If there are few or no lymph nodes, fluid can build up in the arm. It can also happen if the lymph system in an arm has been damaged. Infection, tumors, and scar tissue from radiation therapy to the armpit area also can cause fluid to build up. You may be able to avoid lymphedema or keep it under control by following the tips below. Make sure that you take good care of the skin on your arm and hand. Your skin acts as a barrier to keep out bacteria and prevent infection. It is also important not to overuse the muscles in the arm. And don't expose your arm to very hot or cold temperatures. Lymphedema can happen soon after breast cancer treatment. Or it may happen many years later. It may affect only part of your arm or hand. In some cases, it affects all of the arm. Make sure to follow these precautions even after you finish treatment. Do not ignore tightness or swelling in or around your arm or hand. You are less likely to have long-term problems if you get these symptoms treated right away. Follow-up care is a key part of your treatment and safety. Be sure to make and go to all appointments, and call your doctor if you are having problems. It's also a good idea to know your test results and keep a list of the medicines you take. How can you care for yourself at home? Skin care    Keep your arm, hand, and armpit clean. Use a mild soap that does not dry out your skin. Moisturize your skin often. Take good care of the skin around your fingernails. Do not bite or cut your cuticles. Ask your doctor how to handle any cuts, scratches, insect bites, or other injuries you may get.      Use sunscreen and insect repellent outdoors to protect your

## 2023-03-29 NOTE — PROGRESS NOTES
POSTOPERATIVE NOTE    PATIENT:  Matias Rosales     DATE:     3/29/2023     TIME: 12:59 PM EDT     HISTORY AND CHIEF COMPLAINT:    Matias Rosales  is a 69y.o. year old  Female  status post  right lumpectomy and sln    The pathology showed clear margins, neg sln. PHYSICAL EXAMINATION:         Vitals:    03/29/23 1244 03/29/23 1253   BP: (!) 150/70 (!) 148/72   Site: Left Upper Arm Left Upper Arm   Pulse: 87    Resp: 16    Temp: 97.1 °F (36.2 °C)    SpO2: 99%    Weight: 248 lb (112.5 kg)    Height: 5' 6.5\" (1.689 m)         Matias Rosales  is a 69y.o. year old  Female in no acute distress, alert and oriented x 3. Incision: clean, dry, intact    IMPRESSION:    Status post right lumpectomy and sln    PLAN:    Patient is doing well. Recommend clinical breast exams in the breast surgical suite in 6 month(s)  Mammography in 6 month(s)  Recommend an active lifestyle, healthy diet, limited alcohol intake, achieve and maintain a healthy BMI to optimize breast cancer outcomes / decrease risk of breast cancer. Referral to or Follow up with Medical Oncology  Referral to or Follow up with Radiation Oncology  Follow up with PCP regarding all medical problems  She says BP is normal at home.  She is nervous    Dictated by:  Alexandria Lara MD 3/29/2023 12:59 PM EDT

## 2023-04-17 ENCOUNTER — HOSPITAL ENCOUNTER (OUTPATIENT)
Dept: RADIATION ONCOLOGY | Age: 68
Discharge: HOME | End: 2023-04-17
Payer: MEDICARE

## 2023-04-17 VITALS
HEART RATE: 84 BPM | HEIGHT: 67 IN | RESPIRATION RATE: 18 BRPM | WEIGHT: 244.6 LBS | SYSTOLIC BLOOD PRESSURE: 145 MMHG | TEMPERATURE: 97.5 F | DIASTOLIC BLOOD PRESSURE: 63 MMHG | BODY MASS INDEX: 38.39 KG/M2 | OXYGEN SATURATION: 98 %

## 2023-04-17 DIAGNOSIS — C50.411 CARCINOMA OF UPPER-OUTER QUADRANT OF RIGHT BREAST IN FEMALE, ESTROGEN RECEPTOR POSITIVE (HCC): Primary | ICD-10-CM

## 2023-04-17 DIAGNOSIS — Z17.0 CARCINOMA OF UPPER-OUTER QUADRANT OF RIGHT BREAST IN FEMALE, ESTROGEN RECEPTOR POSITIVE (HCC): Primary | ICD-10-CM

## 2023-04-17 PROCEDURE — 99212 OFFICE O/P EST SF 10 MIN: CPT | Performed by: RADIOLOGY

## 2023-04-17 PROCEDURE — 99497 ADVNCD CARE PLAN 30 MIN: CPT | Performed by: RADIOLOGY

## 2023-04-17 PROCEDURE — 99205 OFFICE O/P NEW HI 60 MIN: CPT | Performed by: RADIOLOGY

## 2023-04-17 RX ORDER — SENNOSIDES 8.6 MG
650 CAPSULE ORAL EVERY 8 HOURS PRN
COMMUNITY

## 2023-04-17 NOTE — PROGRESS NOTES
SW met with pt as she was seen for consultation in radiation oncology for treatment of her breast cancer. Pt is known to this SW, having met for pre-breast surgery education and support on 3/7/2023. Pt states her distress is 7/10 today, particularly as she remains saddened by the death of her mother in late , and a very close friend recently  of cancer. The latter death, she reports, is not only distressing in terms of her grief, but also her death by cancer so close to pt's diagnosis, adds a layer of fear for pt. SW provided empathic reflection and emotional support surrounding these issues. As mentioned, pt continues to live at home with her , whom she describes as very supportive. She is retired from [employer], these last few years, and she expressed she will transport herself to treatment as needed. Currently, she states she is a bit apprehensive about treatment as she remains very sore post surgery, and she is hoping to better understand the purpose/need for radiation. She expressed willingness to discuss this with Dr. Chong Espinal and her hopefulness to learn more. Pt was reminded of supportive services at the cancer center. SW contact information was again provided, and pt invited to contact SW with further questions or concerns. SW will continue to follow should pt have radiation treatment.

## 2023-04-26 ENCOUNTER — APPOINTMENT (OUTPATIENT)
Dept: RADIATION ONCOLOGY | Age: 68
End: 2023-04-26
Payer: MEDICARE

## 2023-04-26 DIAGNOSIS — Z17.0 CARCINOMA OF UPPER-OUTER QUADRANT OF RIGHT BREAST IN FEMALE, ESTROGEN RECEPTOR POSITIVE (HCC): Primary | ICD-10-CM

## 2023-04-26 DIAGNOSIS — C50.411 CARCINOMA OF UPPER-OUTER QUADRANT OF RIGHT BREAST IN FEMALE, ESTROGEN RECEPTOR POSITIVE (HCC): Primary | ICD-10-CM

## 2023-04-26 PROCEDURE — 77334 RADIATION TREATMENT AID(S): CPT | Performed by: RADIOLOGY

## 2023-04-26 PROCEDURE — 77290 THER RAD SIMULAJ FIELD CPLX: CPT | Performed by: RADIOLOGY

## 2023-04-26 PROCEDURE — 77263 THER RADIOLOGY TX PLNG CPLX: CPT | Performed by: RADIOLOGY

## 2023-04-26 PROCEDURE — 77332 RADIATION TREATMENT AID(S): CPT | Performed by: RADIOLOGY

## 2023-04-26 NOTE — PROGRESS NOTES
Teaching & Instructions - Breast  General  Simulation  Initial Treatment  Self-Care Info  Nutrition  Social Service    Site-Specific  Side Effects  Fatigue Mgmt  Lymphedema Mgmt  Pain Mgmt  Skin Care      Educational Handouts  Radiation Therapy & You  Site Specific Instructions  Radiation Oncology Dept Information  CBMS Program    Patient eager to learn, verbalized understanding of verbal education and handout

## 2023-05-01 PROCEDURE — 77300 RADIATION THERAPY DOSE PLAN: CPT | Performed by: RADIOLOGY

## 2023-05-01 PROCEDURE — 77334 RADIATION TREATMENT AID(S): CPT | Performed by: RADIOLOGY

## 2023-05-01 PROCEDURE — 77295 3-D RADIOTHERAPY PLAN: CPT | Performed by: RADIOLOGY

## 2023-05-01 RX ORDER — MOMETASONE FUROATE 1 MG/G
CREAM TOPICAL
Qty: 50 G | Refills: 3 | Status: SHIPPED | OUTPATIENT
Start: 2023-05-01

## 2023-05-02 ENCOUNTER — HOSPITAL ENCOUNTER (OUTPATIENT)
Dept: RADIATION ONCOLOGY | Age: 68
Discharge: HOME | End: 2023-05-02
Payer: MEDICARE

## 2023-05-02 PROCEDURE — 77280 THER RAD SIMULAJ FIELD SMPL: CPT | Performed by: RADIOLOGY

## 2023-05-03 ENCOUNTER — HOSPITAL ENCOUNTER (OUTPATIENT)
Dept: RADIATION ONCOLOGY | Age: 68
Discharge: HOME | End: 2023-05-03
Payer: MEDICARE

## 2023-05-03 PROCEDURE — 77387 GUIDANCE FOR RADJ TX DLVR: CPT | Performed by: RADIOLOGY

## 2023-05-03 PROCEDURE — 77412 RADIATION TX DELIVERY LVL 3: CPT | Performed by: RADIOLOGY

## 2023-05-04 ENCOUNTER — HOSPITAL ENCOUNTER (OUTPATIENT)
Dept: RADIATION ONCOLOGY | Age: 68
Discharge: HOME | End: 2023-05-04
Payer: MEDICARE

## 2023-05-04 VITALS
DIASTOLIC BLOOD PRESSURE: 72 MMHG | TEMPERATURE: 97.7 F | RESPIRATION RATE: 18 BRPM | WEIGHT: 245 LBS | OXYGEN SATURATION: 98 % | BODY MASS INDEX: 38.95 KG/M2 | HEART RATE: 84 BPM | SYSTOLIC BLOOD PRESSURE: 164 MMHG

## 2023-05-04 DIAGNOSIS — Z17.0 CARCINOMA OF UPPER-OUTER QUADRANT OF RIGHT BREAST IN FEMALE, ESTROGEN RECEPTOR POSITIVE (HCC): Primary | ICD-10-CM

## 2023-05-04 DIAGNOSIS — C50.411 CARCINOMA OF UPPER-OUTER QUADRANT OF RIGHT BREAST IN FEMALE, ESTROGEN RECEPTOR POSITIVE (HCC): Primary | ICD-10-CM

## 2023-05-04 PROBLEM — E78.5 HLD (HYPERLIPIDEMIA): Status: ACTIVE | Noted: 2023-05-04

## 2023-05-04 PROBLEM — S96.912A LEFT ANKLE STRAIN: Status: ACTIVE | Noted: 2023-05-04

## 2023-05-04 PROBLEM — E55.9 MILD VITAMIN D DEFICIENCY: Status: ACTIVE | Noted: 2023-05-04

## 2023-05-04 PROBLEM — E11.9 DM2 (DIABETES MELLITUS, TYPE 2) (MULTI): Status: ACTIVE | Noted: 2023-05-04

## 2023-05-04 PROBLEM — B35.4 TINEA CORPORIS: Status: ACTIVE | Noted: 2023-05-04

## 2023-05-04 PROBLEM — E66.812 OBESITY, CLASS II, BMI 35-39.9: Status: ACTIVE | Noted: 2023-05-04

## 2023-05-04 PROBLEM — R53.81 PHYSICAL DEBILITY: Status: ACTIVE | Noted: 2023-05-04

## 2023-05-04 PROBLEM — L20.9 ATOPIC DERMATITIS: Status: ACTIVE | Noted: 2023-05-04

## 2023-05-04 PROBLEM — S00.83XA FACIAL CONTUSION, INITIAL ENCOUNTER: Status: ACTIVE | Noted: 2023-05-04

## 2023-05-04 PROBLEM — R92.8 ABNORMAL MAMMOGRAM: Status: ACTIVE | Noted: 2023-05-04

## 2023-05-04 PROBLEM — F34.1 PRIMARY DYSTHYMIA: Status: ACTIVE | Noted: 2023-05-04

## 2023-05-04 PROBLEM — S39.011A STRAIN OF RECTUS ABDOMINIS MUSCLE: Status: ACTIVE | Noted: 2023-05-04

## 2023-05-04 PROBLEM — J30.9 ALLERGIC RHINITIS: Status: ACTIVE | Noted: 2023-05-04
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
## 2023-05-04 PROBLEM — E66.9 OBESITY: Status: ACTIVE | Noted: 2023-05-04

## 2023-05-04 PROBLEM — L23.9 ALLERGIC DERMATITIS: Status: ACTIVE | Noted: 2023-05-04

## 2023-05-04 PROBLEM — S66.911A WRIST STRAIN, RIGHT, INITIAL ENCOUNTER: Status: ACTIVE | Noted: 2023-05-04

## 2023-05-04 PROBLEM — E53.8 VITAMIN B 12 DEFICIENCY: Status: ACTIVE | Noted: 2023-05-04

## 2023-05-04 PROBLEM — K80.50 BILIARY COLIC: Status: ACTIVE | Noted: 2023-05-04

## 2023-05-04 PROBLEM — R73.9 HYPERGLYCEMIA: Status: ACTIVE | Noted: 2023-05-04

## 2023-05-04 PROBLEM — E66.9 OBESITY, CLASS II, BMI 35-39.9: Status: ACTIVE | Noted: 2023-05-04

## 2023-05-04 PROBLEM — S69.91XA RIGHT WRIST INJURY: Status: ACTIVE | Noted: 2023-05-04

## 2023-05-04 PROBLEM — S46.911A RIGHT SHOULDER STRAIN: Status: ACTIVE | Noted: 2023-05-04

## 2023-05-04 PROBLEM — M85.80 OSTEOPENIA: Status: ACTIVE | Noted: 2023-05-04

## 2023-05-04 PROCEDURE — 77387 GUIDANCE FOR RADJ TX DLVR: CPT | Performed by: RADIOLOGY

## 2023-05-04 PROCEDURE — 77412 RADIATION TX DELIVERY LVL 3: CPT | Performed by: RADIOLOGY

## 2023-05-04 RX ORDER — TETANUS TOXOID, REDUCED DIPHTHERIA TOXOID AND ACELLULAR PERTUSSIS VACCINE, ADSORBED 5; 2.5; 8; 8; 2.5 [IU]/.5ML; [IU]/.5ML; UG/.5ML; UG/.5ML; UG/.5ML
SUSPENSION INTRAMUSCULAR
COMMUNITY
Start: 2021-08-30 | End: 2023-05-08 | Stop reason: ALTCHOICE

## 2023-05-04 RX ORDER — PNEUMOCOCCAL 13-VALENT CONJUGATE VACCINE 2.2; 2.2; 2.2; 2.2; 2.2; 4.4; 2.2; 2.2; 2.2; 2.2; 2.2; 2.2; 2.2 UG/.5ML; UG/.5ML; UG/.5ML; UG/.5ML; UG/.5ML; UG/.5ML; UG/.5ML; UG/.5ML; UG/.5ML; UG/.5ML; UG/.5ML; UG/.5ML; UG/.5ML
INJECTION, SUSPENSION INTRAMUSCULAR
COMMUNITY
Start: 2021-08-31 | End: 2023-05-08 | Stop reason: ALTCHOICE

## 2023-05-04 RX ORDER — CLOBETASOL PROPIONATE 0.5 MG/G
EMULSION TOPICAL 2 TIMES DAILY
COMMUNITY
Start: 2019-10-28

## 2023-05-04 RX ORDER — PNEUMOCOCCAL VACCINE POLYVALENT 25; 25; 25; 25; 25; 25; 25; 25; 25; 25; 25; 25; 25; 25; 25; 25; 25; 25; 25; 25; 25; 25; 25 UG/.5ML; UG/.5ML; UG/.5ML; UG/.5ML; UG/.5ML; UG/.5ML; UG/.5ML; UG/.5ML; UG/.5ML; UG/.5ML; UG/.5ML; UG/.5ML; UG/.5ML; UG/.5ML; UG/.5ML; UG/.5ML; UG/.5ML; UG/.5ML; UG/.5ML; UG/.5ML; UG/.5ML; UG/.5ML; UG/.5ML
INJECTION, SOLUTION INTRAMUSCULAR; SUBCUTANEOUS
COMMUNITY
Start: 2021-09-03 | End: 2023-05-08 | Stop reason: ALTCHOICE

## 2023-05-04 RX ORDER — CHOLECALCIFEROL (VITAMIN D3) 125 MCG
CAPSULE ORAL
COMMUNITY
Start: 2021-07-22 | End: 2023-05-08 | Stop reason: ALTCHOICE

## 2023-05-04 RX ORDER — VIT C/E/ZN/COPPR/LUTEIN/ZEAXAN 250MG-90MG
CAPSULE ORAL
COMMUNITY

## 2023-05-04 RX ORDER — PRAVASTATIN SODIUM 10 MG/1
1 TABLET ORAL DAILY
COMMUNITY
Start: 2021-09-20 | End: 2023-05-08 | Stop reason: SDUPTHER

## 2023-05-04 RX ORDER — CETIRIZINE HYDROCHLORIDE 10 MG/1
1 TABLET ORAL DAILY
COMMUNITY
Start: 2020-06-24

## 2023-05-04 RX ORDER — CITALOPRAM 10 MG/1
1 TABLET ORAL DAILY
COMMUNITY
Start: 2022-09-19 | End: 2023-05-08 | Stop reason: SINTOL

## 2023-05-04 RX ORDER — ACETAMINOPHEN, DEXTROMETHORPHAN HBR, DOXYLAMINE SUCCINATE, PHENYLEPHRINE HCL 650; 20; 12.5; 1 MG/30ML; MG/30ML; MG/30ML; MG/30ML
SOLUTION ORAL
COMMUNITY
Start: 2021-07-22

## 2023-05-04 RX ORDER — BLOOD SUGAR DIAGNOSTIC
STRIP MISCELLANEOUS
COMMUNITY
Start: 2022-07-27 | End: 2023-08-09 | Stop reason: ENTERED-IN-ERROR

## 2023-05-04 RX ORDER — NAPROXEN SODIUM 220 MG
TABLET ORAL
COMMUNITY
End: 2023-05-08 | Stop reason: ALTCHOICE

## 2023-05-04 NOTE — PROGRESS NOTES
17 [facility]           Radiation Oncology      [address] [facility]        [address]: [phone]        F: [phone]       [employer]                   Dr. 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 Dose (cGy):     Patient was seen today for weekly visit.      Wt Readings from Last 3 Encounters:   23 245 lb (111.1 kg)   23 244 lb 9.6 oz (110.9 kg)   23 248 lb (112.5 kg)       BP (!) 164/72   Pulse 84   Temp 97.7 °F (36.5 °C)   Resp 18   Wt 245 lb (111.1 kg)   SpO2 98%   BMI 38.95 kg/m²     Lab Results   Component Value Date    WBC 4.6 (L) 2023     2023       Comfort Alteration  Fatigue:None    Pain Location: shoulder   Pain Intensity (Current): moderate during treatment only  Pain Treatment: N/A  Pain Relief: Stops when takes hands off the bars for RT position  Hot Flashes/Flushes: None    Emotional Alteration:   Coping:  anxious because experience is new    Nutritional Alteration  Anorexia: none   Nausea: No nausea noted  Vomiting: No vomiting   Dyspepsia/Heartburn:  Heartburn today, did not take pepcid today    Skin Alteration   Skin reaction: Moderate erythema to right breast. Using Aquaphor 3 times daily for the last 2 treatments    Ventilation Alteration  Cough: None  Hemoptysis: None  Dyspnea:

## 2023-05-04 NOTE — CONSULTS
17 [facility]           Radiation Oncology      [address] [facility]        [address]        Josee Flores: [phone]        F: [phone]       [employer]                   Dr. Marisela Cuellar MD PhD    CONSULT NOTE     Date of Service: 2023  Patient ID: Salome Alexander   : 1955  MRN: [id number]   Acct Number: [de-identified]       Salome Alexander  69 y.o.   1955    REFERRING PROVIDER: Cristal Burch    PCP:  Margaux Wang MD    DIAGNOSIS:  1. 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 Salome Alexander  is a 69y.o. year old female with history of arthritis, diabetes mellitus, fibromyalgia, hyperlipidemia, anemia, and recent diagnosis of invasive ductal carcinoma of the right breast.  She has discussed the role rationale of adjuvant radiation therapy in the management of her disease. Her oncologic history dates back to an abnormal bilateral screening mammogram from 3/19/2021 which was BI-RADS 0. Diagnostic mammogram with ultrasound on 3/31/2021 which was BI-RADS 3 with surgeon follow-up suggested. Patient had a repeat diagnostic bilateral mammogram and ultrasound 2021 which was read as BI-RADS 1 in the left breast and BI-RADS 3 in the right breast.  Patient had follow-up mammography and ultrasound right breast on 2/3/2023 which was read as BI-RADS 4 in the right breast and revealed a mass at the 9:00 addition, 4 cm from nipple.   She went a right breast core biopsy on 2023 which
no     Number of Years:   Last Pap Smear: Unknown  Last Mammogram:2/3/23    A 10-point review of systems  has been conducted and pertinent positives have been   recorded. All other review of systems are negative    Was the patient admitted during the course of treatment OR within 30 days of treatment?  No        Additional Comments: Med-Onc   initial visit on 5/1/23    PALLIATIVE CARE SCREENING TOOL    Patient Scenarios               (Score 1 Point Each)  The Patient has  A chronic illness with uncontrolled symptoms (e.g. pain, nausea, vomiting, shortness of breath, anorexia)   []  Unresolved psychosocial or spiritual issues                                                                                                     []  Frequent visits to the Emergency Department  (>1 x per month or >2 in last 3 months)                                 []  More than one hospital admission in past 90 days                  []   Complex care requirements (e.g. functional dependency, complex home support for ventilator/antibiotics/feedings, patient in SNF/LTAC/nursing home) []  No advance directives in place                                                                                                                         [x]  TOTAL FOR SECTION #1-      Basic Disease Processes             (Score 3 Points Overall)  Locally advanced or metastatic cancer           []  Non-cancer life-limiting illness (COPD, CHF, advanced dementia, stroke)      []  Total score for section # 2-         Concomitant Disease Processes            (Score 1 Point Overall)  COPD               []   Renal Disease              []  CHF               []  Liver Disease              []  Other significant comorbidities (e.g. failure to thrive, feeding intolerance, functional decline)    []  Total score for section # 3-      Physician to complete #4, #5, & #6      Symptom Assessment             (Score 1 Point Each)  Severe/Uncontrolled Pain (e.g. patients

## 2023-05-05 ENCOUNTER — HOSPITAL ENCOUNTER (OUTPATIENT)
Dept: RADIATION ONCOLOGY | Age: 68
Discharge: HOME | End: 2023-05-05
Payer: MEDICARE

## 2023-05-05 PROCEDURE — 77387 GUIDANCE FOR RADJ TX DLVR: CPT | Performed by: RADIOLOGY

## 2023-05-05 PROCEDURE — 77412 RADIATION TX DELIVERY LVL 3: CPT | Performed by: RADIOLOGY

## 2023-05-08 ENCOUNTER — OFFICE VISIT (OUTPATIENT)
Dept: PRIMARY CARE | Facility: CLINIC | Age: 68
End: 2023-05-08
Payer: MEDICARE

## 2023-05-08 ENCOUNTER — HOSPITAL ENCOUNTER (OUTPATIENT)
Dept: RADIATION ONCOLOGY | Age: 68
Discharge: HOME | End: 2023-05-08
Payer: MEDICARE

## 2023-05-08 VITALS
RESPIRATION RATE: 14 BRPM | DIASTOLIC BLOOD PRESSURE: 76 MMHG | BODY MASS INDEX: 38.14 KG/M2 | OXYGEN SATURATION: 100 % | HEIGHT: 67 IN | HEART RATE: 73 BPM | WEIGHT: 243 LBS | SYSTOLIC BLOOD PRESSURE: 140 MMHG

## 2023-05-08 DIAGNOSIS — C50.919 MALIGNANT NEOPLASM OF BREAST IN FEMALE, ESTROGEN RECEPTOR POSITIVE, UNSPECIFIED LATERALITY, UNSPECIFIED SITE OF BREAST (MULTI): ICD-10-CM

## 2023-05-08 DIAGNOSIS — Z17.0 MALIGNANT NEOPLASM OF BREAST IN FEMALE, ESTROGEN RECEPTOR POSITIVE, UNSPECIFIED LATERALITY, UNSPECIFIED SITE OF BREAST (MULTI): ICD-10-CM

## 2023-05-08 DIAGNOSIS — E78.5 HYPERLIPIDEMIA, UNSPECIFIED HYPERLIPIDEMIA TYPE: ICD-10-CM

## 2023-05-08 DIAGNOSIS — E11.69 TYPE 2 DIABETES MELLITUS WITH OTHER SPECIFIED COMPLICATION, WITHOUT LONG-TERM CURRENT USE OF INSULIN (MULTI): Primary | ICD-10-CM

## 2023-05-08 PROBLEM — E66.9 OBESITY: Status: RESOLVED | Noted: 2023-05-04 | Resolved: 2023-05-08

## 2023-05-08 PROBLEM — S46.911A RIGHT SHOULDER STRAIN: Status: RESOLVED | Noted: 2023-05-04 | Resolved: 2023-05-08

## 2023-05-08 PROBLEM — K80.50 BILIARY COLIC: Status: RESOLVED | Noted: 2023-05-04 | Resolved: 2023-05-08

## 2023-05-08 PROBLEM — R53.81 PHYSICAL DEBILITY: Status: RESOLVED | Noted: 2023-05-04 | Resolved: 2023-05-08

## 2023-05-08 PROBLEM — S66.911A WRIST STRAIN, RIGHT, INITIAL ENCOUNTER: Status: RESOLVED | Noted: 2023-05-04 | Resolved: 2023-05-08

## 2023-05-08 PROBLEM — R73.9 HYPERGLYCEMIA: Status: RESOLVED | Noted: 2023-05-04 | Resolved: 2023-05-08
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
## 2023-05-08 PROBLEM — S69.91XA RIGHT WRIST INJURY: Status: RESOLVED | Noted: 2023-05-04 | Resolved: 2023-05-08

## 2023-05-08 PROBLEM — R92.8 ABNORMAL MAMMOGRAM: Status: RESOLVED | Noted: 2023-05-04 | Resolved: 2023-05-08

## 2023-05-08 PROBLEM — S00.83XA FACIAL CONTUSION, INITIAL ENCOUNTER: Status: RESOLVED | Noted: 2023-05-04 | Resolved: 2023-05-08

## 2023-05-08 PROBLEM — S39.011A STRAIN OF RECTUS ABDOMINIS MUSCLE: Status: RESOLVED | Noted: 2023-05-04 | Resolved: 2023-05-08

## 2023-05-08 PROBLEM — B35.4 TINEA CORPORIS: Status: RESOLVED | Noted: 2023-05-04 | Resolved: 2023-05-08

## 2023-05-08 PROBLEM — S96.912A LEFT ANKLE STRAIN: Status: RESOLVED | Noted: 2023-05-04 | Resolved: 2023-05-08

## 2023-05-08 PROCEDURE — 1036F TOBACCO NON-USER: CPT | Performed by: INTERNAL MEDICINE

## 2023-05-08 PROCEDURE — 77387 GUIDANCE FOR RADJ TX DLVR: CPT | Performed by: RADIOLOGY

## 2023-05-08 PROCEDURE — 77412 RADIATION TX DELIVERY LVL 3: CPT | Performed by: RADIOLOGY

## 2023-05-08 PROCEDURE — 99213 OFFICE O/P EST LOW 20 MIN: CPT | Performed by: INTERNAL MEDICINE

## 2023-05-08 PROCEDURE — 3078F DIAST BP <80 MM HG: CPT | Performed by: INTERNAL MEDICINE

## 2023-05-08 PROCEDURE — 3077F SYST BP >= 140 MM HG: CPT | Performed by: INTERNAL MEDICINE

## 2023-05-08 PROCEDURE — 1159F MED LIST DOCD IN RCRD: CPT | Performed by: INTERNAL MEDICINE

## 2023-05-08 RX ORDER — PRAVASTATIN SODIUM 10 MG/1
10 TABLET ORAL DAILY
Qty: 90 TABLET | Refills: 3 | Status: SHIPPED | OUTPATIENT
Start: 2023-05-08 | End: 2024-02-01 | Stop reason: SDUPTHER

## 2023-05-08 RX ORDER — FAMOTIDINE 40 MG/5ML
10 POWDER, FOR SUSPENSION ORAL DAILY
COMMUNITY

## 2023-05-08 RX ORDER — ACETAMINOPHEN 325 MG/1
TABLET ORAL EVERY 6 HOURS PRN
COMMUNITY

## 2023-05-08 ASSESSMENT — ENCOUNTER SYMPTOMS
CHILLS: 0
VOMITING: 0
NAUSEA: 0
COUGH: 0
SHORTNESS OF BREATH: 0
DIARRHEA: 0
DIAPHORESIS: 0
CONSTIPATION: 0
FEVER: 0
MYALGIAS: 0
JOINT SWELLING: 0

## 2023-05-08 ASSESSMENT — PATIENT HEALTH QUESTIONNAIRE - PHQ9
2. FEELING DOWN, DEPRESSED OR HOPELESS: SEVERAL DAYS
SUM OF ALL RESPONSES TO PHQ9 QUESTIONS 1 AND 2: 2
1. LITTLE INTEREST OR PLEASURE IN DOING THINGS: SEVERAL DAYS

## 2023-05-08 ASSESSMENT — COLUMBIA-SUICIDE SEVERITY RATING SCALE - C-SSRS
2. HAVE YOU ACTUALLY HAD ANY THOUGHTS OF KILLING YOURSELF?: NO
6. HAVE YOU EVER DONE ANYTHING, STARTED TO DO ANYTHING, OR PREPARED TO DO ANYTHING TO END YOUR LIFE?: NO

## 2023-05-08 NOTE — PROGRESS NOTES
"Subjective   Cortney Crawlye is a 67 y.o. female who presents for FOLLOW UP  NEW DX OF R BREAST CANCER STARTED RADIATION TX [facility]   PT HAS RECORDS TO COPY   A1C 8.1 IN FEB PT DEFERS TODAY     HPI LTS STAGE 1 GRADE 2 AGGRESSIVE BREAST CA    PERFORMED LUMPECTOMY.  THEN GENETIC TESTING - ALL NEG    PLAN XRT FOR 4 WEEKS 5 DAYS A WEEK    ALSO CONSIDERING ANTI-ESTROGEN FOR 5 YEARS    LAST A1C 3/2023 WAS 8.1%        Review of Systems   Constitutional:  Negative for chills, diaphoresis and fever.        PER HPI   Respiratory:  Negative for cough and shortness of breath.    Cardiovascular:  Negative for chest pain and leg swelling.   Gastrointestinal:  Negative for constipation, diarrhea, nausea and vomiting.   Musculoskeletal:  Negative for joint swelling and myalgias.       Objective   /76   Pulse 73   Resp 14   Ht 1.702 m (5' 7\")   Wt 110 kg (243 lb)   SpO2 100%   BMI 38.06 kg/m²     Physical Exam  Vitals reviewed.   Constitutional:       General: She is not in acute distress.     Appearance: She is obese. She is not ill-appearing.   Cardiovascular:      Rate and Rhythm: Normal rate and regular rhythm.      Pulses: Normal pulses.      Heart sounds:      No gallop.   Pulmonary:      Breath sounds: Normal breath sounds. No wheezing, rhonchi or rales.   Abdominal:      General: Abdomen is flat. Bowel sounds are normal.      Palpations: Abdomen is soft.      Tenderness: There is no guarding or rebound.   Musculoskeletal:      Right lower leg: No edema.      Left lower leg: No edema.         Assessment/Plan   Problem List Items Addressed This Visit          Endocrine/Metabolic    DM2 (diabetes mellitus, type 2) (CMS/HCC) - Primary    Relevant Medications    semaglutide (Rybelsus) 14 mg tablet tablet       Other    HLD (hyperlipidemia)    Relevant Medications    pravastatin (Pravachol) 10 mg tablet    Malignant neoplasm of breast in female, estrogen receptor positive (CMS/HCC)     Patient " Instructions    PLEASE CHANGE ETHNICITY TO  IN OUR CHART    2.  REFILLS ARE SENT IN FOR YOU    3.  WE REVIEWED PATHOLOGY, ER+, HER2/DEE NEGATIVE    4.  FOLLOW UP 6 MONTHS OR AS NEEDED    5.  RECENT A1C = 8.1%, GPOAL A1C IS <7.0, RECOMMEND ONGOING DIET/EXERCISE/WEIGHT LOSS TO ACHIEVE THIS GPOAL    6.  REGULAR EYE EXAMS AS YOU ARE DOING

## 2023-05-08 NOTE — PATIENT INSTRUCTIONS
PLEASE CHANGE ETHNICITY TO  IN OUR CHART    2.  REFILLS ARE SENT IN FOR YOU    3.  WE REVIEWED PATHOLOGY, ER+, HER2/DEE NEGATIVE    4.  FOLLOW UP 6 MONTHS OR AS NEEDED    5.  RECENT A1C = 8.1%, GPOAL A1C IS <7.0, RECOMMEND ONGOING DIET/EXERCISE/WEIGHT LOSS TO ACHIEVE THIS GPOAL    6.  REGULAR EYE EXAMS AS YOU ARE DOING

## 2023-05-09 ENCOUNTER — HOSPITAL ENCOUNTER (OUTPATIENT)
Dept: RADIATION ONCOLOGY | Age: 68
Discharge: HOME | End: 2023-05-09
Payer: MEDICARE

## 2023-05-09 VITALS
OXYGEN SATURATION: 98 % | WEIGHT: 246.6 LBS | BODY MASS INDEX: 39.21 KG/M2 | RESPIRATION RATE: 18 BRPM | TEMPERATURE: 96.1 F | HEART RATE: 77 BPM | SYSTOLIC BLOOD PRESSURE: 160 MMHG | DIASTOLIC BLOOD PRESSURE: 70 MMHG

## 2023-05-09 DIAGNOSIS — Z17.0 CARCINOMA OF UPPER-OUTER QUADRANT OF RIGHT BREAST IN FEMALE, ESTROGEN RECEPTOR POSITIVE (HCC): Primary | ICD-10-CM

## 2023-05-09 DIAGNOSIS — C50.411 CARCINOMA OF UPPER-OUTER QUADRANT OF RIGHT BREAST IN FEMALE, ESTROGEN RECEPTOR POSITIVE (HCC): Primary | ICD-10-CM

## 2023-05-09 PROCEDURE — 77387 GUIDANCE FOR RADJ TX DLVR: CPT | Performed by: RADIOLOGY

## 2023-05-09 PROCEDURE — 77336 RADIATION PHYSICS CONSULT: CPT | Performed by: RADIOLOGY

## 2023-05-09 PROCEDURE — 77412 RADIATION TX DELIVERY LVL 3: CPT | Performed by: RADIOLOGY

## 2023-05-09 NOTE — PROGRESS NOTES
17 [facility]           Radiation Oncology      [address] [facility]        [address]        Joy Hinkle: [phone]        F: [phone]       [employer]                   Dr. 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 [address]  Staging form: Breast, AJCC 8th Edition  - Clinical: Stage IA (cT1, cN0, cM0, G2, ER+, [place]+, HER2-) - Signed by Андрей Resendiz MD on 2023  - Pathologic: Stage IA (pT1b, pN0(sn), cM0, G2, ER+, [place]+, HER2-, Oncotype DX score: 16) - Signed by Afshin Horan MD on 5/3/2023      Treatment Area: Breast     Current Total Dose(cGy): 1330  Current Fraction: 5      Patient was seen today for weekly visit. Wt Readings from Last 3 Encounters:   23 246 lb 9.6 oz (111.9 kg)   23 245 lb (111.1 kg)   23 244 lb 9.6 oz (110.9 kg)       BP (!) 160/70   Pulse 77   Temp (!) 96.1 °F (35.6 °C)   Resp 18   Wt 246 lb 9.6 oz (111.9 kg)   SpO2 98%   BMI 39.21 kg/m²     Lab Results   Component Value Date    WBC 4.6 (L) 2023     2023       Comfort Alteration  Fatigue:None, able to perform daily activities    Pain Location: none but shoulder soreness after treatment  Pain Intensity (Current): 0 No Pain  Pain Treatment: N/A  Pain Relief: n/a  Hot Flashes/Flushes: None    Emotional Alteration:   Coping:  very nervous person    Nutritional Alteration  Anorexia: none   Nausea: No nausea noted  Vomiting: No vomiting   Dyspepsia/Heartburn: None, takes pepcid with relief    Skin Alteration   Skin reaction:  moderate erythema to right breast, using mometasone and eucerin twice a day    Ventilation Alteration  Cough: None  Hemoptysis: None  Dyspnea: Normal  Mucous Quantity/Quality: .     Additional Comments:

## 2023-05-10 ENCOUNTER — HOSPITAL ENCOUNTER (OUTPATIENT)
Dept: RADIATION ONCOLOGY | Age: 68
Discharge: HOME | End: 2023-05-10
Payer: MEDICARE

## 2023-05-10 PROCEDURE — 77412 RADIATION TX DELIVERY LVL 3: CPT | Performed by: RADIOLOGY

## 2023-05-10 PROCEDURE — 77387 GUIDANCE FOR RADJ TX DLVR: CPT | Performed by: RADIOLOGY

## 2023-05-11 ENCOUNTER — HOSPITAL ENCOUNTER (OUTPATIENT)
Dept: RADIATION ONCOLOGY | Age: 68
Discharge: HOME | End: 2023-05-11
Payer: MEDICARE

## 2023-05-11 PROCEDURE — 77412 RADIATION TX DELIVERY LVL 3: CPT | Performed by: RADIOLOGY

## 2023-05-11 PROCEDURE — 77387 GUIDANCE FOR RADJ TX DLVR: CPT | Performed by: RADIOLOGY

## 2023-05-12 ENCOUNTER — HOSPITAL ENCOUNTER (OUTPATIENT)
Dept: RADIATION ONCOLOGY | Age: 68
Discharge: HOME | End: 2023-05-12
Payer: MEDICARE

## 2023-05-12 PROCEDURE — 77412 RADIATION TX DELIVERY LVL 3: CPT | Performed by: RADIOLOGY

## 2023-05-12 PROCEDURE — 77387 GUIDANCE FOR RADJ TX DLVR: CPT | Performed by: RADIOLOGY

## 2023-05-15 ENCOUNTER — HOSPITAL ENCOUNTER (OUTPATIENT)
Dept: RADIATION ONCOLOGY | Age: 68
Discharge: HOME | End: 2023-05-15
Payer: MEDICARE

## 2023-05-15 PROCEDURE — 77387 GUIDANCE FOR RADJ TX DLVR: CPT | Performed by: RADIOLOGY

## 2023-05-15 PROCEDURE — 77412 RADIATION TX DELIVERY LVL 3: CPT | Performed by: RADIOLOGY

## 2023-05-15 NOTE — PROGRESS NOTES
Beau Nicholson   [id number]  1955   Patient Mid-Point Distress Screening Form   Please select the number that describes how much distress you have experiened in the past week (0-10): 5    Please select the number that descrbes how much distress you have experienced today (0-10): 5    If you responded with a score of 6 or above to the first tow questions; please address the following concerns:    Practical Concerns 0-10 Comment        Work, Concerns about Job          Finances, Bills, Insurance          Housing          Transportation          Availability of Caregiver     Family Concerns          Dealing with Children          Dealing with Partner          Ability to have [address] Issues     Emotional Concerns          Sadness, Depression          Anxiety, Worry, Fear          Concerns about Appearance          Loss of Interest in Usual Activities     Spiritual Concerns          Connection to a higher power          Sense of meaning and purpose          Support for my spiritual community     Physical Concerns          Nausea          Eating, Loss of Appetite          Pain          Fatigue       Other Concerns/Notes:Pt was seen for Midpoint Assessment prior to her radiation treatment today. She reports she is doing well, with no new concerns emerging since start of treatment. She says she has been very effective at keeping herself busy during the day, walking outdoors and doing chores around the home. She states she rests daily before treatment, and then it is only in the time that she knows she must come in for treatment, that her anxiety increases--because the notion of radiation continues to frighten her a bit although she appreciates the treatment value. She says, however, that it quickly subsides once she has treatment , so her distress is compartmentalized to a brief period. She says she feels she is doing well, and identified no new social work concerns.   She was invited to

## 2023-05-16 ENCOUNTER — HOSPITAL ENCOUNTER (OUTPATIENT)
Dept: RADIATION ONCOLOGY | Age: 68
Discharge: HOME | End: 2023-05-16
Payer: MEDICARE

## 2023-05-16 PROCEDURE — 77387 GUIDANCE FOR RADJ TX DLVR: CPT | Performed by: RADIOLOGY

## 2023-05-16 PROCEDURE — 77336 RADIATION PHYSICS CONSULT: CPT | Performed by: RADIOLOGY

## 2023-05-16 PROCEDURE — 77412 RADIATION TX DELIVERY LVL 3: CPT | Performed by: RADIOLOGY

## 2023-05-16 PROCEDURE — 77417 THER RADIOLOGY PORT IMAGE(S): CPT | Performed by: RADIOLOGY

## 2023-05-16 PROCEDURE — 77307 TELETHX ISODOSE PLAN CPLX: CPT | Performed by: RADIOLOGY

## 2023-05-17 ENCOUNTER — HOSPITAL ENCOUNTER (OUTPATIENT)
Dept: RADIATION ONCOLOGY | Age: 68
Discharge: HOME | End: 2023-05-17
Payer: MEDICARE

## 2023-05-17 PROCEDURE — 77412 RADIATION TX DELIVERY LVL 3: CPT | Performed by: RADIOLOGY

## 2023-05-17 PROCEDURE — 77387 GUIDANCE FOR RADJ TX DLVR: CPT | Performed by: RADIOLOGY

## 2023-05-18 ENCOUNTER — HOSPITAL ENCOUNTER (OUTPATIENT)
Dept: RADIATION ONCOLOGY | Age: 68
Discharge: HOME | End: 2023-05-18
Payer: MEDICARE

## 2023-05-18 PROCEDURE — 77412 RADIATION TX DELIVERY LVL 3: CPT | Performed by: RADIOLOGY

## 2023-05-18 PROCEDURE — 77387 GUIDANCE FOR RADJ TX DLVR: CPT | Performed by: RADIOLOGY

## 2023-05-18 NOTE — ON TREATMENT VISIT
DEPARTMENT OF RADIATION ONCOLOGY   ON TREATMENT VISIT       5/18/2023      NAME:  Lauren Cabrera    YOB: 1955    DIAGNOSIS:     SUBJECTIVE:   Lauren Cabrera has now received 3192/4256 cGy in 12/16 fractions directed to the right breast, 0/1000 cGy directed to the tumor bed for management of ER/GA positive, HER2 negative, infiltrating ductal carcinoma, upper outer quadrant, stage Ia, status post lumpectomy. There are still some shooting pain in the right breast.  The radiated skin is not itching or burning. The patient is not aware of any lumps. No pains elsewhere. The arm throat after treatment because of treatment position, but this resolves quickly. No other system issues. The patient's anxiety level is somewhat less than when she started. Past medical, surgical, social and family histories reviewed and updated as indicated. PAIN: 4/10 in the arms during treatment because of the arms up position which resolves rapidly after treatment is completed. ALLERGIES:  Amoxicillin, Amoxicillin-pot clavulanate, Aspirin, Cephalexin, Codeine, Corticosteroids, Darvocet [propoxyphene n-acetaminophen], Helidac [metronid-tetracyc-bis subsal], Meclizine, Metoclopramide, Nizatidine, Sitagliptin, Trimox [amoxicillin trihydrate], Zithromax [azithromycin dihydrate], Adhesive tape, Fentanyl, Metformin, Miconazole-zinc oxide-petrolat, Nickel, Other, and Penicillins         Current Outpatient Medications   Medication Sig Dispense Refill    mometasone (ELOCON) 0.1 % cream Apply topically to the skin of the right breast twice daily with aquaphor.  50 g 3    acetaminophen (TYLENOL) 650 MG extended release tablet Take 1 tablet by mouth every 8 hours as needed for Pain      NONFORMULARY Consuelo (OTC used for vertigo)      Cyanocobalamin (VITAMIN B-12 PO) Take by mouth      Semaglutide (RYBELSUS) 14 MG TABS Take by mouth      pravastatin (PRAVACHOL) 10 MG tablet Take 1 tablet by mouth daily

## 2023-05-18 NOTE — PROGRESS NOTES
MEDICATIONS:     Current Outpatient Medications   Medication Sig Dispense Refill    mometasone (ELOCON) 0.1 % cream Apply topically to the skin of the right breast twice daily with aquaphor. 50 g 3    acetaminophen (TYLENOL) 650 MG extended release tablet Take 1 tablet by mouth every 8 hours as needed for Pain      NONFORMULARY Consuelo (OTC used for vertigo)      Cyanocobalamin (VITAMIN B-12 PO) Take by mouth      Semaglutide (RYBELSUS) 14 MG TABS Take by mouth      pravastatin (PRAVACHOL) 10 MG tablet Take 1 tablet by mouth daily      Naproxen Sodium 220 MG CAPS Take 1 tablet by mouth every 12 hours as needed      clobetasol prop emollient base 0.05 % CREA Apply 1 Dose topically daily as needed      cetirizine (ZYRTEC) 10 MG tablet Take 1 tablet by mouth daily as needed      Famotidine (PEPCID PO) Take 10 mg by mouth daily      vitamin D (CHOLECALCIFEROL) 1000 UNIT TABS tablet Take 1 tablet by mouth daily       No current facility-administered medications for this encounter. * New    PHYSICAL EXAM:       {ECOGPFS:56356::\"ECO - Asymptomatic (Fully active, able to carry on all pre-disease activities without restriction)\"}    {RTPEOTV:68109}    Chemotherapy Update: {EOTSysTx:41079}    Treatment Imaging: {OTVRTIM}    ASSESSMENT: {RTFXOTV:75584} radiation side effects. Responding appropriately to symptomatic management. New medications, diagnostic results: {RTOTVCHANGES:23840}    PLAN: Again reviewed potential side effects of radiation for the patient's treatment. Continue local/topical care. ***   Continue current radiation course as prescribed.

## 2023-05-19 ENCOUNTER — HOSPITAL ENCOUNTER (OUTPATIENT)
Dept: RADIATION ONCOLOGY | Age: 68
Discharge: HOME | End: 2023-05-19
Payer: MEDICARE

## 2023-05-19 PROCEDURE — 77387 GUIDANCE FOR RADJ TX DLVR: CPT | Performed by: RADIOLOGY

## 2023-05-19 PROCEDURE — 77412 RADIATION TX DELIVERY LVL 3: CPT | Performed by: RADIOLOGY

## 2023-05-22 ENCOUNTER — HOSPITAL ENCOUNTER (OUTPATIENT)
Dept: RADIATION ONCOLOGY | Age: 68
Discharge: HOME | End: 2023-05-22
Payer: MEDICARE

## 2023-05-22 PROCEDURE — 77412 RADIATION TX DELIVERY LVL 3: CPT | Performed by: RADIOLOGY

## 2023-05-22 PROCEDURE — 77387 GUIDANCE FOR RADJ TX DLVR: CPT | Performed by: RADIOLOGY

## 2023-05-23 ENCOUNTER — HOSPITAL ENCOUNTER (OUTPATIENT)
Dept: RADIATION ONCOLOGY | Age: 68
Discharge: HOME | End: 2023-05-23
Payer: MEDICARE

## 2023-05-23 PROCEDURE — 77387 GUIDANCE FOR RADJ TX DLVR: CPT | Performed by: RADIOLOGY

## 2023-05-23 PROCEDURE — 77412 RADIATION TX DELIVERY LVL 3: CPT | Performed by: RADIOLOGY

## 2023-05-23 PROCEDURE — 77336 RADIATION PHYSICS CONSULT: CPT | Performed by: RADIOLOGY

## 2023-05-24 ENCOUNTER — HOSPITAL ENCOUNTER (OUTPATIENT)
Dept: RADIATION ONCOLOGY | Age: 68
Discharge: HOME | End: 2023-05-24
Payer: MEDICARE

## 2023-05-24 PROCEDURE — 77387 GUIDANCE FOR RADJ TX DLVR: CPT | Performed by: RADIOLOGY

## 2023-05-24 PROCEDURE — 77412 RADIATION TX DELIVERY LVL 3: CPT | Performed by: RADIOLOGY

## 2023-05-25 ENCOUNTER — HOSPITAL ENCOUNTER (OUTPATIENT)
Dept: RADIATION ONCOLOGY | Age: 68
Discharge: HOME | End: 2023-05-25
Payer: MEDICARE

## 2023-05-25 VITALS
DIASTOLIC BLOOD PRESSURE: 65 MMHG | HEART RATE: 71 BPM | TEMPERATURE: 97.3 F | WEIGHT: 239.2 LBS | SYSTOLIC BLOOD PRESSURE: 139 MMHG | BODY MASS INDEX: 38.03 KG/M2 | RESPIRATION RATE: 18 BRPM | OXYGEN SATURATION: 99 %

## 2023-05-25 DIAGNOSIS — C50.411 CARCINOMA OF UPPER-OUTER QUADRANT OF RIGHT BREAST IN FEMALE, ESTROGEN RECEPTOR POSITIVE (HCC): Primary | ICD-10-CM

## 2023-05-25 DIAGNOSIS — Z17.0 CARCINOMA OF UPPER-OUTER QUADRANT OF RIGHT BREAST IN FEMALE, ESTROGEN RECEPTOR POSITIVE (HCC): Primary | ICD-10-CM

## 2023-05-25 PROCEDURE — 77412 RADIATION TX DELIVERY LVL 3: CPT | Performed by: RADIOLOGY

## 2023-05-25 PROCEDURE — 77387 GUIDANCE FOR RADJ TX DLVR: CPT | Performed by: RADIOLOGY

## 2023-05-25 PROCEDURE — 77014 CHG CT GUIDANCE RADIATION THERAPY FLDS PLACEMENT: CPT | Performed by: RADIOLOGY

## 2023-05-25 NOTE — PROGRESS NOTES
17 [facility]           Radiation Oncology      [address] [facility]        [address]        Smiley Maik: [phone]        F: [phone]       [place]                   Dr. Rivas Dunn MD PhD    ON TREATMENT VISIT (OTV) NOTE     Date of Service: 2023  Patient ID: Charity Bedoya   : 1955  MRN: [id number]   [facility]     DIAGNOSIS:   Cancer Staging   Carcinoma of upper-outer quadrant of right breast in female, estrogen receptor positive ([facility] [address]  Staging form: Breast, AJCC 8th Edition  - Clinical: Stage IA (cT1, cN0, cM0, G2, ER+, MN+, HER2-) - Signed by Tutu Palafox MD on 2023  - Pathologic: Stage IA (pT1b, pN0(sn), cM0, G2, ER+, MN+, HER2-, Oncotype DX score: 16) - Signed by Rivas Dunn MD on 5/3/2023      Treatment Area: Breast     Current Total Dose(cGy): 4506 cGy  Current Fraction: 17  Final/Cumulative Rx. Dose (cGy):     Patient was seen today for weekly visit.      Wt Readings from Last 3 Encounters:   23 239 lb 3.2 oz (108.5 kg)   23 246 lb 9.6 oz (111.9 kg)   23 245 lb (111.1 kg)       /65   Pulse 71   Temp 97.3 °F (36.3 °C)   Resp 18   Wt 239 lb 3.2 oz (108.5 kg)   SpO2 99%   BMI 38.03 kg/m²     Lab Results   Component Value Date    WBC 4.6 (L) 2023     2023       Comfort Alteration  Fatigue:None, able to perform daily activities    Pain Location: Both shoulders and back today because held onto bars extra long today  Pain Intensity (Current): 2 Mild pain  Pain Treatment: N/A  Pain Relief: n/a  Hot Flashes/Flushes: None    Emotional Alteration:   Coping: effective    Nutritional Alteration  Anorexia: none   Nausea: No nausea noted  Vomiting: No vomiting   Dyspepsia/Heartburn: None-takes pepcid daily    Skin Alteration   Skin reaction: Bright erythema to right breast. Using mometasone and Eucerin BID    Ventilation Alteration  Cough: None  Hemoptysis: None  Dyspnea: Normal  Mucous

## 2023-05-26 ENCOUNTER — HOSPITAL ENCOUNTER (OUTPATIENT)
Dept: RADIATION ONCOLOGY | Age: 68
Discharge: HOME | End: 2023-05-26
Payer: MEDICARE

## 2023-05-26 PROCEDURE — 77412 RADIATION TX DELIVERY LVL 3: CPT | Performed by: RADIOLOGY

## 2023-05-26 PROCEDURE — 77387 GUIDANCE FOR RADJ TX DLVR: CPT | Performed by: RADIOLOGY

## 2023-05-30 ENCOUNTER — HOSPITAL ENCOUNTER (OUTPATIENT)
Dept: RADIATION ONCOLOGY | Age: 68
Discharge: HOME | End: 2023-05-30
Payer: MEDICARE

## 2023-05-30 PROCEDURE — 77387 GUIDANCE FOR RADJ TX DLVR: CPT | Performed by: RADIOLOGY

## 2023-05-30 PROCEDURE — 77412 RADIATION TX DELIVERY LVL 3: CPT | Performed by: RADIOLOGY

## 2023-05-30 PROCEDURE — 77014 CHG CT GUIDANCE RADIATION THERAPY FLDS PLACEMENT: CPT | Performed by: RADIOLOGY

## 2023-05-31 ENCOUNTER — HOSPITAL ENCOUNTER (OUTPATIENT)
Dept: RADIATION ONCOLOGY | Age: 68
Discharge: HOME | End: 2023-05-31
Payer: MEDICARE

## 2023-05-31 PROCEDURE — 77014 CHG CT GUIDANCE RADIATION THERAPY FLDS PLACEMENT: CPT | Performed by: RADIOLOGY

## 2023-05-31 PROCEDURE — 77336 RADIATION PHYSICS CONSULT: CPT | Performed by: RADIOLOGY

## 2023-05-31 PROCEDURE — 77387 GUIDANCE FOR RADJ TX DLVR: CPT | Performed by: RADIOLOGY

## 2023-05-31 PROCEDURE — 77412 RADIATION TX DELIVERY LVL 3: CPT | Performed by: RADIOLOGY

## 2023-05-31 NOTE — PROGRESS NOTES
Discharge instructions reviewed and given.  Follow up visit has been scheduled for 7/5/23 at 2:30 pm.

## 2023-05-31 NOTE — PROGRESS NOTES
17 [facility]           Radiation Oncology      [address] [facility]        [address]        Sadia Perezer: [phone]        F: [phone]       [email or web address]                   Dr. Presley Lewis MD PhD    RADIATION TREATMENT SUMMARY NOTE     Date of Service: 2023  Patient ID: Yuniel Hassan   : 1955  MRN: [id number]   Acct Number: [de-identified]       Patient Name:  Yuniel Hassan,  1955,  69 y.o., female       Referring Physician: Otis Jones MD  [address] [place] [address]      PCP: Jerry Juarez MD       Diagnosis:  Carcinoma of upper-outer quadrant of right breast in female, estrogen receptor positive ([facility] [address]  Staging form: Breast, AJCC 8th Edition  - Clinical: Stage IA (cT1, cN0, cM0, G2, ER+, UT+, HER2-) - Signed by Otis Jones MD on 2023  - Pathologic: Stage IA (pT1b, pN0(sn), cM0, G2, ER+, UT+, HER2-, Oncotype DX score: 16) - Signed by Presley Lewis MD on 5/3/2023       Recent History:  ***    Site Start TX Last  [place] 153 ED Fractions Dose Fx Dose Technique   Rt breast 5/3/23 5/24/23 21 16 4256 cGy 266 cGy Tangents   Rt breast Cd  CUMM 5/25/23  5/3/23 5/31/23  5/31/23 6 4 1000 cGy  5256 cGy 250 cGy 5 Field     Concurrent therapy:      ***    Technique:                 ***      Treatment course:       ***    Plan:  ***         Presley Lewis MD PhD  Radiation Oncologist, [facility]    Department [facility]

## 2023-06-27 ENCOUNTER — TELEPHONE (OUTPATIENT)
Dept: OBGYN CLINIC | Age: 68
End: 2023-06-27

## 2023-07-05 ENCOUNTER — HOSPITAL ENCOUNTER (OUTPATIENT)
Dept: RADIATION ONCOLOGY | Age: 68
Discharge: HOME | End: 2023-07-05
Payer: MEDICARE

## 2023-07-05 VITALS
RESPIRATION RATE: 16 BRPM | OXYGEN SATURATION: 98 % | SYSTOLIC BLOOD PRESSURE: 138 MMHG | WEIGHT: 241.2 LBS | BODY MASS INDEX: 38.35 KG/M2 | DIASTOLIC BLOOD PRESSURE: 63 MMHG | TEMPERATURE: 97.5 F | HEART RATE: 76 BPM

## 2023-07-05 DIAGNOSIS — C50.411 CARCINOMA OF UPPER-OUTER QUADRANT OF RIGHT BREAST IN FEMALE, ESTROGEN RECEPTOR POSITIVE (HCC): Primary | ICD-10-CM

## 2023-07-05 DIAGNOSIS — Z17.0 CARCINOMA OF UPPER-OUTER QUADRANT OF RIGHT BREAST IN FEMALE, ESTROGEN RECEPTOR POSITIVE (HCC): Primary | ICD-10-CM

## 2023-07-05 PROCEDURE — 99212 OFFICE O/P EST SF 10 MIN: CPT | Performed by: RADIOLOGY

## 2023-07-05 NOTE — PROGRESS NOTES
NURSING ASSESSMENT     Date: 7/5/2023        Patient Name: Chancey Sandifer     YOB: 1955      Age:  69 y.o. MRN: [id number]       Chaperone [] Yes   [x] No      Advance Directives:   Do you currently have completed advance directives (living will)? [] Yes   [x] No         *If yes, please bring us a copy for your records. *If no, would you like info or assistance in completing advance directives (living will)? [] Yes   [x] No    Pain Score:   Pain Score (1-10): none      Is pain affecting your ability to take care of yourself or move throughout your home? [] Yes   [x] No    General: only felt fatigue once through treatment  Patient has gained weight [] Yes   [x] No  Patient has lost weight [] Yes   [x] No    Eyes (Ophthalmic): No Change     Skin (Dermatological): hyperpigmentation right breast with peeling inframammory fold and crease between breast and axilla. Pt is using Eucerin three times a day. Pt felt some shooting pain in right breast today when she was out in the heat and sunshine     ENT: No Problems     Respiratory: No Problems     Cardiovascular: No Problems      Device   [] Yes   [x] No   Copy of Card Obtained [] Yes   [x] No    Gastrointestinal: No Problems    Genito-Urinary: No Problems     Breast: see skin assessment     Musculoskeletal:  back pain is baseline    Neurological: No Problems      Hematological and Lymphatic: No Problems     Endocrine: Dr Presley Ovalle waiting for breast to be healed to start anastrozole    Gyn History: No problems    A 10-point review of systems  has been conducted and pertinent positives have been   recorded. All other review of systems are negative    Was the patient admitted during the course of treatment OR within 30 days of treatment?  no    Additional Comments: Pt will call Dr Presley Ovalle when skin is completely healed for rx for anastrozole, Mammogram scheduled for 9/28/23

## 2023-07-26 ENCOUNTER — APPOINTMENT (OUTPATIENT)
Dept: RADIATION ONCOLOGY | Age: 68
End: 2023-07-26
Payer: MEDICARE

## 2023-08-09 ENCOUNTER — TELEPHONE (OUTPATIENT)
Dept: PRIMARY CARE | Facility: CLINIC | Age: 68
End: 2023-08-09

## 2023-08-09 ENCOUNTER — OFFICE VISIT (OUTPATIENT)
Dept: PRIMARY CARE | Facility: CLINIC | Age: 68
End: 2023-08-09
Payer: MEDICARE

## 2023-08-09 VITALS
SYSTOLIC BLOOD PRESSURE: 136 MMHG | WEIGHT: 239 LBS | DIASTOLIC BLOOD PRESSURE: 78 MMHG | BODY MASS INDEX: 37.51 KG/M2 | RESPIRATION RATE: 14 BRPM | HEART RATE: 75 BPM | OXYGEN SATURATION: 100 % | HEIGHT: 67 IN

## 2023-08-09 DIAGNOSIS — E11.9 TYPE 2 DIABETES MELLITUS WITHOUT COMPLICATION, WITHOUT LONG-TERM CURRENT USE OF INSULIN (MULTI): ICD-10-CM

## 2023-08-09 DIAGNOSIS — Z17.0 MALIGNANT NEOPLASM OF BREAST IN FEMALE, ESTROGEN RECEPTOR POSITIVE, UNSPECIFIED LATERALITY, UNSPECIFIED SITE OF BREAST (MULTI): ICD-10-CM

## 2023-08-09 DIAGNOSIS — Z00.00 MEDICARE ANNUAL WELLNESS VISIT, SUBSEQUENT: Primary | ICD-10-CM

## 2023-08-09 DIAGNOSIS — E78.5 HYPERLIPIDEMIA, UNSPECIFIED HYPERLIPIDEMIA TYPE: ICD-10-CM

## 2023-08-09 DIAGNOSIS — E55.9 MILD VITAMIN D DEFICIENCY: ICD-10-CM

## 2023-08-09 DIAGNOSIS — E66.9 OBESITY, CLASS II, BMI 35-39.9: ICD-10-CM

## 2023-08-09 DIAGNOSIS — C50.919 MALIGNANT NEOPLASM OF BREAST IN FEMALE, ESTROGEN RECEPTOR POSITIVE, UNSPECIFIED LATERALITY, UNSPECIFIED SITE OF BREAST (MULTI): ICD-10-CM

## 2023-08-09 LAB — POC HEMOGLOBIN A1C: 6.6 % (ref 4.2–6.5)

## 2023-08-09 PROCEDURE — 3078F DIAST BP <80 MM HG: CPT | Performed by: INTERNAL MEDICINE

## 2023-08-09 PROCEDURE — 1159F MED LIST DOCD IN RCRD: CPT | Performed by: INTERNAL MEDICINE

## 2023-08-09 PROCEDURE — 3075F SYST BP GE 130 - 139MM HG: CPT | Performed by: INTERNAL MEDICINE

## 2023-08-09 PROCEDURE — 1170F FXNL STATUS ASSESSED: CPT | Performed by: INTERNAL MEDICINE

## 2023-08-09 PROCEDURE — G0439 PPPS, SUBSEQ VISIT: HCPCS | Performed by: INTERNAL MEDICINE

## 2023-08-09 PROCEDURE — 1036F TOBACCO NON-USER: CPT | Performed by: INTERNAL MEDICINE

## 2023-08-09 PROCEDURE — 83036 HEMOGLOBIN GLYCOSYLATED A1C: CPT | Performed by: INTERNAL MEDICINE

## 2023-08-09 RX ORDER — BLOOD-GLUCOSE METER
1 EACH MISCELLANEOUS DAILY
COMMUNITY

## 2023-08-09 ASSESSMENT — ENCOUNTER SYMPTOMS
FEVER: 0
CHILLS: 0
COUGH: 0
JOINT SWELLING: 0
NAUSEA: 0
VOMITING: 0
DIAPHORESIS: 0
MYALGIAS: 0
CONSTIPATION: 0
DIARRHEA: 0
SHORTNESS OF BREATH: 0

## 2023-08-09 ASSESSMENT — ACTIVITIES OF DAILY LIVING (ADL)
BATHING: INDEPENDENT
MANAGING_FINANCES: INDEPENDENT
DRESSING: INDEPENDENT
TAKING_MEDICATION: INDEPENDENT
GROCERY_SHOPPING: INDEPENDENT
DOING_HOUSEWORK: INDEPENDENT

## 2023-08-09 ASSESSMENT — PATIENT HEALTH QUESTIONNAIRE - PHQ9
SUM OF ALL RESPONSES TO PHQ9 QUESTIONS 1 AND 2: 2
1. LITTLE INTEREST OR PLEASURE IN DOING THINGS: SEVERAL DAYS
2. FEELING DOWN, DEPRESSED OR HOPELESS: SEVERAL DAYS

## 2023-08-09 ASSESSMENT — COLUMBIA-SUICIDE SEVERITY RATING SCALE - C-SSRS
2. HAVE YOU ACTUALLY HAD ANY THOUGHTS OF KILLING YOURSELF?: NO
6. HAVE YOU EVER DONE ANYTHING, STARTED TO DO ANYTHING, OR PREPARED TO DO ANYTHING TO END YOUR LIFE?: NO
1. IN THE PAST MONTH, HAVE YOU WISHED YOU WERE DEAD OR WISHED YOU COULD GO TO SLEEP AND NOT WAKE UP?: NO

## 2023-08-09 NOTE — PATIENT INSTRUCTIONS
A1C IS 6.6% TODAY = EXCELLENT DIABETIC CONTROL    2.  FASTING LABS ARE ORDERED FOR YOU FOR 6 MONTHS    3.  YOU ARE VERY CAUGHT UP FROM A MEDICARE STANDPOINT ON HEALTH SCREENING    4.  REGULAR EYE EXAMS ARE RECOMMENDED AS YOU ARE DOING    5.  REGULAR DIET/EXERCISE/WEIGHT MANAGEMENT AS YOU ARE DOING.  THESE PLUS GOOD CONTROL OF DIABETES ARE KNOWN TO GIVE PATIENTS RECOVERING FROM BREAST CANCER AN EDGE REGARDING AVOIDANCE OF RECURRENCE.    6.  6 MONTH FOLLOW UP OR AS NEEDED.

## 2023-08-09 NOTE — TELEPHONE ENCOUNTER
Pt called to advise that she is using One Touch Verio NOT what is listed on her summary.  Any questions, please give her a call.

## 2023-08-09 NOTE — PROGRESS NOTES
"Subjective   Reason for Visit: Cortney Crawley is an 67 y.o. female here for a Medicare Wellness visit.     Past Medical, Surgical, and Family History reviewed and updated in chart.    Reviewed all medications by prescribing practitioner or clinical pharmacist (such as prescriptions, OTCs, herbal therapies and supplements) and documented in the medical record.    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   Respiratory:  Negative for cough and shortness of breath.    Cardiovascular:  Negative for chest pain and leg swelling.   Gastrointestinal:  Negative for constipation, diarrhea, nausea and vomiting.   Musculoskeletal:  Negative for joint swelling and myalgias.       Objective   Vitals:  /78   Pulse 75   Resp 14   Ht 1.702 m (5' 7\")   Wt 108 kg (239 lb)   SpO2 100%   BMI 37.43 kg/m²       Physical Exam  Vitals reviewed.   Constitutional:       General: She is not in acute distress.     Appearance: She is obese. She is not ill-appearing.   Cardiovascular:      Rate and Rhythm: Normal rate and regular rhythm.      Pulses: Normal pulses.      Heart sounds:      No gallop.   Pulmonary:      Breath sounds: Normal breath sounds. No wheezing, rhonchi or rales.   Abdominal:      General: Abdomen is flat. Bowel sounds are normal.      Palpations: Abdomen is soft.      Tenderness: There is no guarding or rebound.   Musculoskeletal:      Right lower leg: No edema.      Left lower leg: No edema.       Patient Instructions    A1C IS " 6.6% TODAY = EXCELLENT DIABETIC CONTROL    2.  FASTING LABS ARE ORDERED FOR YOU FOR 6 MONTHS    3.  YOU ARE VERY CAUGHT UP FROM A MEDICARE STANDPOINT ON HEALTH SCREENING    4.  REGULAR EYE EXAMS ARE RECOMMENDED AS YOU ARE DOING    5.  REGULAR DIET/EXERCISE/WEIGHT MANAGEMENT AS YOU ARE DOING.  THESE PLUS GOOD CONTROL OF DIABETES ARE KNOWN TO GIVE PATIENTS RECOVERING FROM BREAST CANCER AN EDGE REGARDING AVOIDANCE OF RECURRENCE.    6.  6 MONTH FOLLOW UP OR AS NEEDED.    Assessment/Plan   Problem List Items Addressed This Visit       DM2 (diabetes mellitus, type 2) (CMS/[facility])    Relevant Orders    POCT glycosylated hemoglobin (Hb A1C) manually resulted    Protein, Urine Random    HLD (hyperlipidemia)    Relevant Orders    Vitamin B12    Lipid Panel    TSH with reflex to Free T4 if abnormal    Comprehensive Metabolic Panel    CBC    Mild vitamin D deficiency    Relevant Orders    Vitamin D, Total    Obesity, Class II, BMI 35-39.9    Malignant neoplasm of breast in female, estrogen receptor positive (CMS/[facility])     Other Visit Diagnoses       Medicare annual wellness visit, subsequent    -  Primary    Relevant Orders    Hepatitis C Antibody

## 2023-09-21 ENCOUNTER — TELEPHONE (OUTPATIENT)
Dept: PRIMARY CARE | Facility: CLINIC | Age: 68
End: 2023-09-21
Payer: MEDICARE

## 2023-09-21 NOTE — TELEPHONE ENCOUNTER
Pt wanted you to know she was exposed to covid yesterday.  She has no sx, but because of her cancer and radiations, she wants to know if you want her to do/take anything special or if and when she should test?

## 2023-09-26 ENCOUNTER — TELEPHONE (OUTPATIENT)
Dept: SURGERY | Age: 68
End: 2023-09-26

## 2023-09-26 NOTE — TELEPHONE ENCOUNTER
LM for pt regarding the provider's need to reschedule appt on 10/9/23 to 10/10/23. Requested pt contact the office to confirm the date change.

## 2023-09-27 ENCOUNTER — HOSPITAL ENCOUNTER (OUTPATIENT)
Dept: ULTRASOUND IMAGING | Age: 68
End: 2023-09-27
Payer: MEDICARE

## 2023-09-27 ENCOUNTER — HOSPITAL ENCOUNTER (OUTPATIENT)
Dept: WOMENS IMAGING | Age: 68
Discharge: HOME | End: 2023-09-29
Payer: MEDICARE

## 2023-09-27 VITALS — HEIGHT: 67 IN | BODY MASS INDEX: 37.67 KG/M2 | WEIGHT: 240 LBS

## 2023-09-27 DIAGNOSIS — C50.411 CARCINOMA OF UPPER-OUTER QUADRANT OF RIGHT BREAST IN FEMALE, ESTROGEN RECEPTOR POSITIVE (HCC): ICD-10-CM

## 2023-09-27 DIAGNOSIS — N64.9 BREAST DISORDER: ICD-10-CM

## 2023-09-27 DIAGNOSIS — Z17.0 CARCINOMA OF UPPER-OUTER QUADRANT OF RIGHT BREAST IN FEMALE, ESTROGEN RECEPTOR POSITIVE (HCC): ICD-10-CM

## 2023-09-27 PROCEDURE — G0279 TOMOSYNTHESIS, MAMMO: HCPCS

## 2023-10-09 ENCOUNTER — OFFICE VISIT (OUTPATIENT)
Dept: SURGERY | Age: 68
End: 2023-10-09
Payer: MEDICARE

## 2023-10-09 ENCOUNTER — TELEPHONE (OUTPATIENT)
Dept: OBGYN CLINIC | Age: 68
End: 2023-10-09

## 2023-10-09 VITALS
DIASTOLIC BLOOD PRESSURE: 64 MMHG | BODY MASS INDEX: 38.33 KG/M2 | SYSTOLIC BLOOD PRESSURE: 139 MMHG | HEIGHT: 67 IN | RESPIRATION RATE: 16 BRPM | TEMPERATURE: 97.1 F | WEIGHT: 244.2 LBS | HEART RATE: 75 BPM

## 2023-10-09 DIAGNOSIS — E66.9 OBESITY, CLASS II, BMI 35-39.9: ICD-10-CM

## 2023-10-09 DIAGNOSIS — Z17.0 CARCINOMA OF UPPER-OUTER QUADRANT OF RIGHT BREAST IN FEMALE, ESTROGEN RECEPTOR POSITIVE (HCC): Primary | ICD-10-CM

## 2023-10-09 DIAGNOSIS — Z12.31 ENCOUNTER FOR SCREENING MAMMOGRAM FOR BREAST CANCER: ICD-10-CM

## 2023-10-09 DIAGNOSIS — C50.411 CARCINOMA OF UPPER-OUTER QUADRANT OF RIGHT BREAST IN FEMALE, ESTROGEN RECEPTOR POSITIVE (HCC): Primary | ICD-10-CM

## 2023-10-09 PROCEDURE — 99215 OFFICE O/P EST HI 40 MIN: CPT | Performed by: SURGERY

## 2023-10-09 PROCEDURE — 1123F ACP DISCUSS/DSCN MKR DOCD: CPT | Performed by: SURGERY

## 2023-10-09 RX ORDER — ANASTROZOLE 1 MG/1
1 TABLET ORAL DAILY
COMMUNITY
Start: 2023-09-16

## 2023-10-09 RX ORDER — ACETAMINOPHEN 160 MG
1 TABLET,DISINTEGRATING ORAL DAILY
COMMUNITY

## 2023-10-09 RX ORDER — MECLIZINE HCL 12.5 MG/1
25 TABLET ORAL EVERY 8 HOURS PRN
COMMUNITY
End: 2023-10-09

## 2023-10-09 ASSESSMENT — ENCOUNTER SYMPTOMS
CHEST TIGHTNESS: 0
NAUSEA: 0
ABDOMINAL PAIN: 0
SHORTNESS OF BREATH: 0
COLOR CHANGE: 0
SORE THROAT: 0
VOMITING: 0
COUGH: 0

## 2023-10-09 NOTE — PATIENT INSTRUCTIONS
Don't wear clothing or jewelry that is tight on your arm or hand. Your doctor may advise you not to wear a watch or rings on the affected hand. Wear gloves when you do activities that could hurt the skin on your fingers or hand. Wear them when you garden, do yard work, wash dishes, and clean with chemicals. Use oven mitts when you handle hot food. Try not to have blood drawn from the arm on the side of the lymph node surgery. Do not get injections (shots) or have an I.V. put in the affected arm. Try not to have a blood pressure cuff placed on that arm. If you are in the hospital, make sure you tell your nurse and other hospital staff about your condition. Do not expose your arm to very hot or very cold temperatures. For example, don't use hot tubs, saunas, or steam rooms. Don't use a heating pad or cold pack on that arm or shoulder. Rest your arm often when you do repeated movements, such as vacuum, scrub, or mop. Try to use your other arm to carry heavy things, such as grocery bags. If you carry a briefcase or a purse, avoid shoulder straps and carry it on your good arm. Ask your doctor about wearing a compression sleeve and glove (gauntlet). Your doctor may want you to wear these when you exercise or when you fly in an airplane. They can help keep fluid from pooling in your arm and hand. Maintain a healthy weight    Try to get to a healthy weight and stay there. If you need to lose weight, talk with your doctor. Exercise    Ask your doctor about exercises for your arm and hand. Your doctor may recommend that you see a health professional trained in lymphedema management, such as a physical therapist. This person can teach you how to do self-massage to move fluid out of your arm. Check with your doctor before you start exercises that use the arm. This includes tennis, rowing, or weight lifting. Your doctor can help you find an activity level that's right for you.    When should

## 2023-10-09 NOTE — PROGRESS NOTES
General Information   Patient Name: Kinsey Born  Patient : 1955    Patient PAZCI:[phone]  Email: No e-mail address on record    Health Care Providers (Including Names, Institution)   Primary Care Provider: Marcell Kumar MD    Surgeon: Amor Narvaez MD [facility]   Radiation Oncologist: Dr. Kam Cabrera Oncologist: [facility]       Treatment Summary   Diagnosis   Cancer Staging   Carcinoma of upper-outer quadrant of right breast in female, estrogen receptor positive ([address])  Staging form: Breast, AJCC 8th Edition  - Clinical: Stage IA (cT1, cN0, cM0, G2, ER+, OR+, HER2-) - Signed by Amor Narvaez MD on 2023  - Pathologic: Stage IA (pT1b, pN0(sn), cM0, G2, ER+, OR+, HER2-, Oncotype DX score: 16) - Signed by Jose Santana MD on 5/3/2023       Diagnosis Year:    Treatment Completed   Surgery: [x] Yes   []No Surgery Date(s) (year): 3/16/223   Surgical procedure/findings: Right Breast Lumpectomy and SLNB    Lymph node removal: []Axillary Dissection [x] [place] Biopsy   Radiation: Yes Body area treated: Breast End Date (year):2023   Systemic Therapy (chemotherapy, hormonal therapy, other): Yes  [] Before surgery [x] After surgery   Treatment Ongoing   Additional treatment name Planned duration Possible Side effects   [x] Aromatase Inhibitors (anastrozole, exemestane and letrozole) 5 Years Hot flashes, joint/muscle aches, vaginal dryness and bone loss (common); hair thinning (rare) Other rare side effects may occur. Persistent symptoms or side effects at completion of treatment:  Fatigue: No                                                                Menopausal symptoms: No      Numbness:  No                                                          Pain: No      Psychosocial/Depression: No                                    Other (enter type(s)): Familial Cancer Risk Assessment   Breast and or ovarian cancer in 1st or 2nd degree relatives:   Yes
evidence of recurrent malignancy. BIRADS: BIRADS - CATEGORY 2 - BENIGN (*) DISCLAIMER: [facility] of Rad[facility] Recommendations for Breast Cancer Screening for Women of Average Risk* * Women age 36 and older (who have no symptoms) should have an annual mammogram. * Screening with mammography should continue as long as the women is in good health and is willing to undergo additional testing (including biopsy) if an abnormality is detected. * If you are or may be at high risk for breast cancer, you should speak with your doctor to decide if additional screening tests might be right for you. Assessment:       ICD-10-CM    1. Carcinoma of upper-outer quadrant of right breast in female, estrogen receptor positive ([address])  C50.411     Z17.0       2. Encounter for screening mammogram for breast cancer  Z12.31 HE NEIL DIGITAL SCREEN BILATERAL      3. Obesity, Class II, BMI 35-39.9  E66.9             Plan:     Patient is doing well. Recommend clinical breast exams in the breast surgical suite in 6 month(s)  Mammography in 1 year(s)  Monitor signs of lymphedema  Lymphedema guidelines reviewed  Those on Aromatase Inhibitors should follow up with medical oncologist / PCP for monitoring bone health   Recommend an active lifestyle, healthy diet, limited alcohol intake, achieve and maintain a healthy BMI to optimize breast cancer outcomes / decrease risk of breast cancer.   Gave patient the survivorship booklet  Survivorship Visit in the Survivorship Clinic  Referral to or Follow up with Medical Oncology  Referral to or Follow up with Radiation Oncology  Follow up with PCP regarding all medical problems          I spent a total of 40 minutes on the date of the service which included preparing to see the patient, face-to-face patient care, completing clinical documentation, obtaining and/or reviewing separately obtained history, performing a medically appropriate examination, counseling and educating the

## 2023-10-09 NOTE — TELEPHONE ENCOUNTER
PT would like a call back from Mireya Martinez. She is asking to talk to someone about the lymphedema. She knows she is not supposed to be lifting too much and doesn't know the weight limits. She wants to know what exercises she can do and the doctor talked about massage. She wants to discuss with someone so she is not doing something wrong. Please advise.

## 2023-10-17 ENCOUNTER — HOSPITAL ENCOUNTER (OUTPATIENT)
Dept: RADIATION ONCOLOGY | Age: 68
Discharge: HOME | End: 2023-10-17
Payer: MEDICARE

## 2023-10-17 VITALS
SYSTOLIC BLOOD PRESSURE: 134 MMHG | BODY MASS INDEX: 39.02 KG/M2 | WEIGHT: 245.4 LBS | OXYGEN SATURATION: 98 % | TEMPERATURE: 96.8 F | DIASTOLIC BLOOD PRESSURE: 63 MMHG | HEART RATE: 69 BPM

## 2023-10-17 DIAGNOSIS — Z17.0 CARCINOMA OF UPPER-OUTER QUADRANT OF RIGHT BREAST IN FEMALE, ESTROGEN RECEPTOR POSITIVE (HCC): Primary | ICD-10-CM

## 2023-10-17 DIAGNOSIS — C50.411 CARCINOMA OF UPPER-OUTER QUADRANT OF RIGHT BREAST IN FEMALE, ESTROGEN RECEPTOR POSITIVE (HCC): Primary | ICD-10-CM

## 2023-10-17 PROCEDURE — 99213 OFFICE O/P EST LOW 20 MIN: CPT | Performed by: RADIOLOGY

## 2023-10-17 PROCEDURE — 99211 OFF/OP EST MAY X REQ PHY/QHP: CPT | Performed by: RADIOLOGY

## 2023-10-17 NOTE — PROGRESS NOTES
NURSING ASSESSMENT     Date: 10/17/2023        Patient Name: Jazmin Bettencourt     YOB: 1955      Age:  69 y.o. MRN: [id number]       Chaperone [] Yes   [] No      Advance Directives: no  Do you currently have completed advance directives (living will)? [] Yes   [x] No         *If yes, please bring us a copy for your records. *If no, would you like info or assistance in completing advance directives (living will)? [] Yes   [x] No    Pain Score:   Pain Score (1-10): 0   Pain Location: n/a   Pain Duration: n/a   Pain Management/Control: n/a      Is pain affecting your ability to take care of yourself or move throughout your home? [] Yes   [x] No    General: {ML OPFBNJY:18663}  Patient has gained weight [] Yes   [x] No  Patient has lost weight [] Yes   [x] No  How much weight in pounds and over what length of time:     Eyes (Ophthalmic): No Problem     Skin (Dermatological): No Problems     ENT: No Problems     Respiratory: No Problems     Cardiovascular: No Problems      Device   [] Yes   [x] No   Copy of Card Obtained [] Yes   [x] No    Gastrointestinal: No Problems    Genito-Urinary: No Problems   No Problems      Breast: Changes   Skin is tan colored post radiation. Pt denies any hardening or lumpc  Musculoskeletal: No Problems    Neurological: No Problems      Hematological and Lymphatic: No Problems     Endocrine: Diabetes Mellitus    Gyn History:   /Para:   Age of Menarche:   Age of Menopause  Vaginal Bleeding:    First Pregnancy:  Breast Feeding:    Last Menstrual period:   Oral Contraceptives:      Number of years:   Hormone Replacement therapy     Number of Years:   Last Pap Smear:   Last Mammogram    A 10-point review of systems  has been conducted and pertinent positives have been   recorded. All other review of systems are negative    Was the patient admitted during the course of treatment OR within 30 days of treatment?      If yes:  Date of

## 2024-01-26 ENCOUNTER — TELEPHONE (OUTPATIENT)
Dept: PRIMARY CARE | Facility: CLINIC | Age: 69
End: 2024-01-26
Payer: MEDICARE

## 2024-02-01 ENCOUNTER — OFFICE VISIT (OUTPATIENT)
Dept: PRIMARY CARE | Facility: CLINIC | Age: 69
End: 2024-02-01
Payer: MEDICARE

## 2024-02-01 VITALS
BODY MASS INDEX: 38.84 KG/M2 | WEIGHT: 248 LBS | OXYGEN SATURATION: 99 % | SYSTOLIC BLOOD PRESSURE: 128 MMHG | RESPIRATION RATE: 14 BRPM | DIASTOLIC BLOOD PRESSURE: 80 MMHG | HEART RATE: 75 BPM

## 2024-02-01 DIAGNOSIS — Z00.00 PHYSICAL EXAM: ICD-10-CM

## 2024-02-01 DIAGNOSIS — Z17.0 MALIGNANT NEOPLASM OF BREAST IN FEMALE, ESTROGEN RECEPTOR POSITIVE, UNSPECIFIED LATERALITY, UNSPECIFIED SITE OF BREAST (MULTI): ICD-10-CM

## 2024-02-01 DIAGNOSIS — E11.69 TYPE 2 DIABETES MELLITUS WITH OTHER SPECIFIED COMPLICATION, WITHOUT LONG-TERM CURRENT USE OF INSULIN (MULTI): ICD-10-CM

## 2024-02-01 DIAGNOSIS — E66.01 OBESITY, MORBID (MULTI): ICD-10-CM

## 2024-02-01 DIAGNOSIS — C50.919 MALIGNANT NEOPLASM OF BREAST IN FEMALE, ESTROGEN RECEPTOR POSITIVE, UNSPECIFIED LATERALITY, UNSPECIFIED SITE OF BREAST (MULTI): ICD-10-CM

## 2024-02-01 DIAGNOSIS — E11.9 TYPE 2 DIABETES MELLITUS WITHOUT COMPLICATION, WITHOUT LONG-TERM CURRENT USE OF INSULIN (MULTI): Primary | ICD-10-CM

## 2024-02-01 DIAGNOSIS — E78.5 HYPERLIPIDEMIA, UNSPECIFIED HYPERLIPIDEMIA TYPE: ICD-10-CM

## 2024-02-01 DIAGNOSIS — L30.8 SPONGIOTIC DERMATITIS: ICD-10-CM

## 2024-02-01 LAB — POC HEMOGLOBIN A1C: 6.9 % (ref 4.2–6.5)

## 2024-02-01 PROCEDURE — 99214 OFFICE O/P EST MOD 30 MIN: CPT | Performed by: INTERNAL MEDICINE

## 2024-02-01 PROCEDURE — 83036 HEMOGLOBIN GLYCOSYLATED A1C: CPT | Performed by: INTERNAL MEDICINE

## 2024-02-01 PROCEDURE — 1159F MED LIST DOCD IN RCRD: CPT | Performed by: INTERNAL MEDICINE

## 2024-02-01 PROCEDURE — 3079F DIAST BP 80-89 MM HG: CPT | Performed by: INTERNAL MEDICINE

## 2024-02-01 PROCEDURE — 1036F TOBACCO NON-USER: CPT | Performed by: INTERNAL MEDICINE

## 2024-02-01 PROCEDURE — 3074F SYST BP LT 130 MM HG: CPT | Performed by: INTERNAL MEDICINE

## 2024-02-01 RX ORDER — ANASTROZOLE 1 MG/1
1 TABLET ORAL DAILY
COMMUNITY

## 2024-02-01 RX ORDER — PRAVASTATIN SODIUM 10 MG/1
10 TABLET ORAL DAILY
Qty: 90 TABLET | Refills: 3 | Status: SHIPPED | OUTPATIENT
Start: 2024-02-01

## 2024-02-01 ASSESSMENT — PATIENT HEALTH QUESTIONNAIRE - PHQ9
2. FEELING DOWN, DEPRESSED OR HOPELESS: NOT AT ALL
1. LITTLE INTEREST OR PLEASURE IN DOING THINGS: NOT AT ALL
SUM OF ALL RESPONSES TO PHQ9 QUESTIONS 1 AND 2: 0

## 2024-02-01 ASSESSMENT — ENCOUNTER SYMPTOMS
MYALGIAS: 0
COUGH: 0
CHILLS: 0
CONSTIPATION: 0
JOINT SWELLING: 0
DIAPHORESIS: 0
NAUSEA: 0
VOMITING: 0
SHORTNESS OF BREATH: 0
DIARRHEA: 0
FEVER: 0

## 2024-02-01 NOTE — PROGRESS NOTES
Subjective   Cortney Crawley is a 68 y.o. female who presents for  follow up   would like a copy of last labs to take to Oncologist     HPI   Nervous but ok    WHEN HOT OUTSIDE, SUN HITS BACK, HAVE ITCH    WAS TOLD SPONGIFORM DERMATITIS?    USING HEAD AND SHOULDERS FOR DANDRUFF    IF ICE IT, ITCH FEELS BETTER.    WAS TAKING ZYTEC BUT SEEM NOT EFFECTIVE ANYMORE?            Review of Systems   Constitutional:  Negative for chills, diaphoresis and fever.   Respiratory:  Negative for cough and shortness of breath.    Cardiovascular:  Negative for chest pain and leg swelling.   Gastrointestinal:  Negative for constipation, diarrhea, nausea and vomiting.   Musculoskeletal:  Negative for joint swelling and myalgias.   Skin:         ITCH PER HPI       Objective   /80   Pulse 75   Resp 14   Wt 112 kg (248 lb)   SpO2 99%   BMI 38.84 kg/m²     Physical Exam  Vitals reviewed.   Constitutional:       General: She is not in acute distress.     Appearance: She is obese. She is not ill-appearing.   Cardiovascular:      Rate and Rhythm: Normal rate and regular rhythm.      Pulses: Normal pulses.      Heart sounds:      No gallop.   Pulmonary:      Breath sounds: Normal breath sounds. No wheezing, rhonchi or rales.   Abdominal:      General: Abdomen is flat. Bowel sounds are normal.      Palpations: Abdomen is soft.      Tenderness: There is no guarding or rebound.   Musculoskeletal:      Right lower leg: No edema.      Left lower leg: No edema.   Skin:     Findings: No lesion or rash.      Comments: NO RASH OR EXCORIATION IS SEEN         Assessment/Plan   Problem List Items Addressed This Visit       DM2 (diabetes mellitus, type 2) (CMS/HCC) - Primary    Relevant Medications    semaglutide (Rybelsus) 14 mg tablet tablet    Other Relevant Orders    POCT glycosylated hemoglobin (Hb A1C) manually resulted    HLD (hyperlipidemia)    Relevant Medications    pravastatin (Pravachol) 10 mg tablet    Malignant neoplasm of  breast in female, estrogen receptor positive (CMS/HCC)     MONITORED BY ONC/SURGERY         Obesity, morbid (CMS/HCC)     STABLE AT PRESENT          Other Visit Diagnoses       Spongiotic dermatitis        Physical exam              Patient Instructions    OK TO TRY ALLEGRA FOR HIVES, AS LONG AS IT HAS NO DECONGESTANT IN IT    2.  IF NON-SEDATING ANTIHISTAMINES SUCH AS ZYTEC AND ALLEGRA DO NOT HELP, THEN YOU COULD TRIAL A SEDATING ANTIHISTAMINE SUCH AS BENADRYL (DIPHENHYDRAMINE), OR HYDROXYZINE FOR ITCHING RELATED TO SOLAR URTICARIA VS. SPONGIOTIC DERMATITIS TYPE ITCH    3.  FINALLY IF YOU CAN'T GET RELIEF FROM NON-SEDATING OR SEDATING ANTIHISTAMINES, THEN SOMETIMES COMBINATION OF EITHER NON-SEDATING OR SEDATING ANTIHISTAMINES PLUS A STOMACH ACID BLOCKER LIKE ZANTAC OR PEPCID CAN DO THE TRICK.    4.  REFILLS ARE SENT IN    5.  A1C LEVEL TODAY IS 6.9% = ONGOING EXCELLENT DIABETIC CONTROL, ONGOING DIET/EXERCISE AND WEIGHT LOSS WILL HELP LOWER THIS FURTHER    6.  FOLLOW UP 6 MONTHS

## 2024-02-01 NOTE — PATIENT INSTRUCTIONS
OK TO TRY ALLEGRA FOR HIVES, AS LONG AS IT HAS NO DECONGESTANT IN IT    2.  IF NON-SEDATING ANTIHISTAMINES SUCH AS ZYTEC AND ALLEGRA DO NOT HELP, THEN YOU COULD TRIAL A SEDATING ANTIHISTAMINE SUCH AS BENADRYL (DIPHENHYDRAMINE), OR HYDROXYZINE FOR ITCHING RELATED TO SOLAR URTICARIA VS. SPONGIOTIC DERMATITIS TYPE ITCH    3.  FINALLY IF YOU CAN'T GET RELIEF FROM NON-SEDATING OR SEDATING ANTIHISTAMINES, THEN SOMETIMES COMBINATION OF EITHER NON-SEDATING OR SEDATING ANTIHISTAMINES PLUS A STOMACH ACID BLOCKER LIKE ZANTAC OR PEPCID CAN DO THE TRICK.    4.  REFILLS ARE SENT IN    5.  A1C LEVEL TODAY IS 6.9% = ONGOING EXCELLENT DIABETIC CONTROL, ONGOING DIET/EXERCISE AND WEIGHT LOSS WILL HELP LOWER THIS FURTHER    6.  FOLLOW UP 6 MONTHS

## 2024-02-05 ENCOUNTER — TRANSCRIBE ORDERS (OUTPATIENT)
Dept: ADMINISTRATIVE | Age: 69
End: 2024-02-05

## 2024-02-05 DIAGNOSIS — Z78.0 MENOPAUSE: Primary | ICD-10-CM

## 2024-02-08 ENCOUNTER — TELEPHONE (OUTPATIENT)
Dept: PRIMARY CARE | Facility: CLINIC | Age: 69
End: 2024-02-08
Payer: MEDICARE

## 2024-02-08 DIAGNOSIS — D69.6 THROMBOCYTOPENIA (CMS-HCC): Primary | ICD-10-CM

## 2024-02-08 DIAGNOSIS — D72.819 LEUKOPENIA, UNSPECIFIED TYPE: ICD-10-CM

## 2024-02-08 DIAGNOSIS — D75.89 MACROCYTOSIS WITHOUT ANEMIA: ICD-10-CM

## 2024-02-08 NOTE — TELEPHONE ENCOUNTER
[place],  Please let msKevin WHEAT know labs LOOK FOR THE MOST PART GOOD, STABLE.  VITD REMAINS A LITTLE LOW, SO RECOMMEND INCREASING VIT D3 TO 2000 IU DAILY.   BLOOD COUNTS SHOW A MINIMAL LOW WHITE BLOOD CELL COUNT, AND A MINIMALLY LOW PLATELET COUNT.  RECOMMEND SHE GET REPEAT BLOOD TEST IN 2 WEEKS TO MAKE SURE PLATELETS ARE NOT GOING ANY LOWER.  IN THE MEANTIME IF SHE HAS ANY UNUSUAL BLEEDING OR PINPOINT RED/PURPLE RASH ON HER LEGS OR OTHERWISE, BE SURE TO CALL ME RIGHT AWAY.  I SUSPECT THAT HER PLATELETS MAY BE CLUMPING FROM ANASTROZOLE, MAKING A FALSELY LOW READING.  DON'T STOP ANASTROZOLE.  THX

## 2024-04-15 ENCOUNTER — OFFICE VISIT (OUTPATIENT)
Dept: RADIATION ONCOLOGY | Age: 69
End: 2024-04-15
Payer: MEDICARE

## 2024-04-15 VITALS
DIASTOLIC BLOOD PRESSURE: 71 MMHG | HEIGHT: 67 IN | BODY MASS INDEX: 38.61 KG/M2 | WEIGHT: 246 LBS | OXYGEN SATURATION: 98 % | RESPIRATION RATE: 14 BRPM | SYSTOLIC BLOOD PRESSURE: 172 MMHG | HEART RATE: 80 BPM | TEMPERATURE: 97.7 F

## 2024-04-15 DIAGNOSIS — Z08 ENCOUNTER FOR FOLLOW-UP SURVEILLANCE OF BREAST CANCER: Primary | ICD-10-CM

## 2024-04-15 DIAGNOSIS — Z85.3 ENCOUNTER FOR FOLLOW-UP SURVEILLANCE OF BREAST CANCER: Primary | ICD-10-CM

## 2024-04-15 PROCEDURE — 99213 OFFICE O/P EST LOW 20 MIN: CPT | Performed by: NURSE PRACTITIONER

## 2024-04-15 PROCEDURE — 1123F ACP DISCUSS/DSCN MKR DOCD: CPT | Performed by: NURSE PRACTITIONER

## 2024-04-15 ASSESSMENT — ENCOUNTER SYMPTOMS
ABDOMINAL PAIN: 0
COUGH: 0
TROUBLE SWALLOWING: 0
FACIAL SWELLING: 0
EYE DISCHARGE: 0
SHORTNESS OF BREATH: 0

## 2024-04-15 NOTE — PROGRESS NOTES
swelling.   Gastrointestinal:  Negative for abdominal pain.   Genitourinary:  Negative for difficulty urinating and vaginal bleeding.   Musculoskeletal:  Positive for arthralgias (stable) and myalgias (stable, h/o fibromyalgia).   Allergic/Immunologic: Negative for immunocompromised state.   Neurological:  Negative for tremors, facial asymmetry and weakness.   Hematological:  Negative for adenopathy. Does not bruise/bleed easily.   Psychiatric/Behavioral:  Negative for agitation and confusion.         PHYSICAL EXAM:  BP (!) 172/71 (Site: Left Upper Arm, Position: Sitting, Cuff Size: Medium Adult)   Pulse 80   Temp 97.7 °F (36.5 °C)   Resp 14   Ht 1.689 m (5' 6.5\")   Wt 111.6 kg (246 lb)   SpO2 98%   BMI 39.11 kg/m²   General Appearance: Well developed, well nourished, appears stated age, in no acute distress  Neurological: Alert and oriented to person, place, and time. Normal speech. Normal gait and balance  Psychiatric: Normal mood and affect. Attention span and thought content normal.   HEENT: Normocephalic, atraumatic. Clear conjunctiva and sclera non-icteric  Neck: Supple, symmetrical, trachea midline, no cervical/supraclavicular adenopathy  CV: Regular rate and rhythm  Pulmonary: Normal respiratory rate and effort. No respiratory distress  Musculoskeletal: Normal range of motion in upper and lower extremities  Extremities: No significant peripheral edema  Skin: Skin is warm and dry. No rashes or lesions  GI/Abdomen: Soft, non-tender. No masses or organomegaly  Breasts: Breasts are symmetrical  Right Breast: Normal post-surgical, RT changes. The skin, nipple, and areola appear normal. There is no skin dimpling with movement of the pectoralis. There is no nipple retraction. There is no nipple discharge elicited. There are no dominant masses in the breast. The axillary tail is normal.  Left Breast: The skin, nipple, and areola appear normal. There is no skin dimpling with movement of the pectoralis. There

## 2024-05-21 ENCOUNTER — HOSPITAL ENCOUNTER (OUTPATIENT)
Dept: RADIATION ONCOLOGY | Age: 69
Discharge: HOME | End: 2024-05-21
Payer: MEDICARE

## 2024-05-21 VITALS
DIASTOLIC BLOOD PRESSURE: 63 MMHG | WEIGHT: 244.6 LBS | BODY MASS INDEX: 38.89 KG/M2 | HEART RATE: 84 BPM | TEMPERATURE: 97.4 F | OXYGEN SATURATION: 99 % | SYSTOLIC BLOOD PRESSURE: 131 MMHG | RESPIRATION RATE: 16 BRPM

## 2024-05-21 DIAGNOSIS — Z17.0 CARCINOMA OF UPPER-OUTER QUADRANT OF RIGHT BREAST IN FEMALE, ESTROGEN RECEPTOR POSITIVE (HCC): Primary | ICD-10-CM

## 2024-05-21 DIAGNOSIS — C50.411 CARCINOMA OF UPPER-OUTER QUADRANT OF RIGHT BREAST IN FEMALE, ESTROGEN RECEPTOR POSITIVE (HCC): Primary | ICD-10-CM

## 2024-05-21 PROCEDURE — 99212 OFFICE O/P EST SF 10 MIN: CPT | Performed by: RADIOLOGY

## 2024-05-21 RX ORDER — FEXOFENADINE HCL 60 MG/1
60 TABLET, FILM COATED ORAL DAILY
COMMUNITY

## 2024-05-21 NOTE — PROGRESS NOTES
NURSING ASSESSMENT     Date: 5/21/2024        Patient Name: Kate Khan     YOB: 1955      Age:  68 y.o.      MRN: [id number]       Chaperone [] Yes   [x] No      Advance Directives:   Do you currently have completed advance directives (living will)? [] Yes   [x] No         *If yes, please bring us a copy for your records.  *If no, would you like info or assistance in completing advance directives (living will)?   [] Yes   [x] No    Pain Score: baseline fibromyalgia and arthritis pain. Denies any new pain or changes.      Is pain affecting your ability to take care of yourself or move throughout your home?                        [] Yes   [x] No    General: No Problems  Patient has gained weight [] Yes   [x] No  Patient has lost weight [] Yes   [x] No  How much weight in pounds and over what length of time:     Eyes (Ophthalmic): No Change     Skin (Dermatological): No Problems     ENT: No Problems     Respiratory: No Problems     Cardiovascular: No Problems      Device   [] Yes   [x] No   Copy of Card Obtained [] Yes   [x] No    Gastrointestinal: Baseline constipation, takes daily metamucil which she states works well.     Genito-Urinary:    No Problems    Breast: No Problems     Musculoskeletal: No Problems    Neurological: History vertigo      Hematological and Lymphatic: taking daily anastrozole      Endocrine: Diabetes Mellitus      A 10-point review of systems  has been conducted and pertinent positives have been   recorded. All other review of systems are negative    Was the patient admitted during the course of treatment OR within 30 days of treatment? none    If yes: n/a  Date of Admission: n/a  Hospital: n/a    Additional Comments:

## 2024-07-02 ENCOUNTER — OFFICE VISIT (OUTPATIENT)
Dept: PRIMARY CARE | Facility: CLINIC | Age: 69
End: 2024-07-02
Payer: MEDICARE

## 2024-07-02 VITALS
DIASTOLIC BLOOD PRESSURE: 80 MMHG | TEMPERATURE: 98.3 F | RESPIRATION RATE: 20 BRPM | OXYGEN SATURATION: 97 % | BODY MASS INDEX: 38.5 KG/M2 | HEART RATE: 90 BPM | SYSTOLIC BLOOD PRESSURE: 130 MMHG | WEIGHT: 245.8 LBS

## 2024-07-02 DIAGNOSIS — U07.1 COVID-19: ICD-10-CM

## 2024-07-02 DIAGNOSIS — B96.89 ACUTE BACTERIAL SINUSITIS: Primary | ICD-10-CM

## 2024-07-02 DIAGNOSIS — R50.9 FEVER, UNSPECIFIED FEVER CAUSE: ICD-10-CM

## 2024-07-02 DIAGNOSIS — J01.90 ACUTE BACTERIAL SINUSITIS: Primary | ICD-10-CM

## 2024-07-02 LAB — POC SARS-COV-2 AG BINAX: ABNORMAL

## 2024-07-02 PROCEDURE — 1159F MED LIST DOCD IN RCRD: CPT | Performed by: NURSE PRACTITIONER

## 2024-07-02 PROCEDURE — 87811 SARS-COV-2 COVID19 W/OPTIC: CPT | Performed by: NURSE PRACTITIONER

## 2024-07-02 PROCEDURE — 3079F DIAST BP 80-89 MM HG: CPT | Performed by: NURSE PRACTITIONER

## 2024-07-02 PROCEDURE — 99214 OFFICE O/P EST MOD 30 MIN: CPT | Performed by: NURSE PRACTITIONER

## 2024-07-02 PROCEDURE — 3075F SYST BP GE 130 - 139MM HG: CPT | Performed by: NURSE PRACTITIONER

## 2024-07-02 RX ORDER — CLARITHROMYCIN 500 MG/1
500 TABLET, FILM COATED ORAL 2 TIMES DAILY
Qty: 14 TABLET | Refills: 0 | Status: SHIPPED | OUTPATIENT
Start: 2024-07-02 | End: 2024-07-09

## 2024-07-02 RX ORDER — FEXOFENADINE HCL 60 MG
60 TABLET ORAL DAILY
COMMUNITY

## 2024-07-02 ASSESSMENT — ENCOUNTER SYMPTOMS
SORE THROAT: 1
SINUS PRESSURE: 1
COUGH: 1
HEADACHES: 1
HOARSE VOICE: 1
CHILLS: 0

## 2024-07-02 NOTE — PROGRESS NOTES
Subjective   Patient ID: Cortney Crawley is a 68 y.o. female who presents for Sinusitis (Going on 3 weeks).  Sinusitis  This is a new problem. Episode onset: going into 3rd  week. The maximum temperature recorded prior to her arrival was 100.4 - 100.9 F. She is experiencing no pain. Associated symptoms include congestion, coughing, headaches, a hoarse voice, sinus pressure and a sore throat. Pertinent negatives include no chills. (Ear congestion, pain near sinuses, fatigue) Treatments tried: simply saline my pure mist. The treatment provided no relief.   Review of Systems   Constitutional:  Negative for chills.   HENT:  Positive for congestion, hoarse voice, sinus pressure and sore throat.    Respiratory:  Positive for cough.    Neurological:  Positive for headaches.   Objective   /80   Pulse 90   Temp 36.8 °C (98.3 °F)   Resp 20   Wt 111 kg (245 lb 12.8 oz)   SpO2 97%   BMI 38.50 kg/m²   Physical Exam  Vitals reviewed.   Constitutional:       Appearance: Normal appearance.   HENT:      Head: Normocephalic.   Eyes:      Conjunctiva/sclera: Conjunctivae normal.   Cardiovascular:      Rate and Rhythm: Normal rate and regular rhythm.      Pulses: Normal pulses.   Pulmonary:      Breath sounds: Normal breath sounds.   Abdominal:      General: Bowel sounds are normal.      Palpations: Abdomen is soft.   Musculoskeletal:      Cervical back: Neck supple.   Skin:     General: Skin is warm and dry.   Neurological:      General: No focal deficit present.      Mental Status: She is alert.   Psychiatric:         Mood and Affect: Mood normal.         Thought Content: Thought content normal.       Assessment/Plan   1. Acute bacterial sinusitis  clarithromycin (Biaxin) 500 mg tablet    She now seems to have a bacterial sinusitis. Will treat w/ Biaxin and comfort measures.      2. Fever, unspecified fever cause  POCT BinaxNOW Covid-19 Ag Card manually resulted    CANCELED: Sars-CoV-2 PCR      3. COVID-19      Since  she has been sick for 2.5 weeks, it is too late for antiviral medication. Comfort measures.      Increase oral fluids/water, at least eight 8-oz glasses/day.  Get plenty of rest.  Cepacol lozenges and/or Chloraseptic spray PRN for sore throat. Salt water gargle or broth for comfort.  Alternate Tylenol/Ibuprofen PRN for body aches and/or fever  Saline nasal spray PRN for rhinitis.  Guaifenessin/Guaifenissin DM PRN for cough

## 2024-07-31 ENCOUNTER — APPOINTMENT (OUTPATIENT)
Dept: PRIMARY CARE | Facility: CLINIC | Age: 69
End: 2024-07-31
Payer: MEDICARE

## 2024-07-31 VITALS
SYSTOLIC BLOOD PRESSURE: 124 MMHG | BODY MASS INDEX: 38.61 KG/M2 | HEIGHT: 67 IN | RESPIRATION RATE: 16 BRPM | WEIGHT: 246 LBS | DIASTOLIC BLOOD PRESSURE: 77 MMHG | OXYGEN SATURATION: 97 % | HEART RATE: 76 BPM | TEMPERATURE: 98.2 F

## 2024-07-31 DIAGNOSIS — C50.919 MALIGNANT NEOPLASM OF BREAST IN FEMALE, ESTROGEN RECEPTOR POSITIVE, UNSPECIFIED LATERALITY, UNSPECIFIED SITE OF BREAST (MULTI): ICD-10-CM

## 2024-07-31 DIAGNOSIS — E78.5 HYPERLIPIDEMIA, UNSPECIFIED HYPERLIPIDEMIA TYPE: ICD-10-CM

## 2024-07-31 DIAGNOSIS — E11.9 DIABETES MELLITUS WITHOUT COMPLICATION (MULTI): ICD-10-CM

## 2024-07-31 DIAGNOSIS — E55.9 MILD VITAMIN D DEFICIENCY: ICD-10-CM

## 2024-07-31 DIAGNOSIS — E11.69 TYPE 2 DIABETES MELLITUS WITH OTHER SPECIFIED COMPLICATION, WITHOUT LONG-TERM CURRENT USE OF INSULIN (MULTI): ICD-10-CM

## 2024-07-31 DIAGNOSIS — R26.2 DIFFICULTY WALKING: ICD-10-CM

## 2024-07-31 DIAGNOSIS — K21.9 GASTROESOPHAGEAL REFLUX DISEASE, UNSPECIFIED WHETHER ESOPHAGITIS PRESENT: ICD-10-CM

## 2024-07-31 DIAGNOSIS — Z00.00 MEDICARE ANNUAL WELLNESS VISIT, SUBSEQUENT: Primary | ICD-10-CM

## 2024-07-31 DIAGNOSIS — E66.9 OBESITY, CLASS II, BMI 35-39.9: ICD-10-CM

## 2024-07-31 DIAGNOSIS — M17.0 PRIMARY OSTEOARTHRITIS OF BOTH KNEES: ICD-10-CM

## 2024-07-31 DIAGNOSIS — Z17.0 MALIGNANT NEOPLASM OF BREAST IN FEMALE, ESTROGEN RECEPTOR POSITIVE, UNSPECIFIED LATERALITY, UNSPECIFIED SITE OF BREAST (MULTI): ICD-10-CM

## 2024-07-31 LAB — POC HEMOGLOBIN A1C: 6.5 % (ref 4.2–6.5)

## 2024-07-31 PROCEDURE — 99212 OFFICE O/P EST SF 10 MIN: CPT | Performed by: INTERNAL MEDICINE

## 2024-07-31 PROCEDURE — 3048F LDL-C <100 MG/DL: CPT | Performed by: INTERNAL MEDICINE

## 2024-07-31 PROCEDURE — 3074F SYST BP LT 130 MM HG: CPT | Performed by: INTERNAL MEDICINE

## 2024-07-31 PROCEDURE — G0439 PPPS, SUBSEQ VISIT: HCPCS | Performed by: INTERNAL MEDICINE

## 2024-07-31 PROCEDURE — 1170F FXNL STATUS ASSESSED: CPT | Performed by: INTERNAL MEDICINE

## 2024-07-31 PROCEDURE — 3008F BODY MASS INDEX DOCD: CPT | Performed by: INTERNAL MEDICINE

## 2024-07-31 PROCEDURE — 3078F DIAST BP <80 MM HG: CPT | Performed by: INTERNAL MEDICINE

## 2024-07-31 PROCEDURE — 1159F MED LIST DOCD IN RCRD: CPT | Performed by: INTERNAL MEDICINE

## 2024-07-31 PROCEDURE — 3060F POS MICROALBUMINURIA REV: CPT | Performed by: INTERNAL MEDICINE

## 2024-07-31 PROCEDURE — 1123F ACP DISCUSS/DSCN MKR DOCD: CPT | Performed by: INTERNAL MEDICINE

## 2024-07-31 PROCEDURE — 83036 HEMOGLOBIN GLYCOSYLATED A1C: CPT | Performed by: INTERNAL MEDICINE

## 2024-07-31 RX ORDER — ACETAMINOPHEN 500 MG
50 TABLET ORAL DAILY
Start: 2024-07-31

## 2024-07-31 RX ORDER — PRAVASTATIN SODIUM 10 MG/1
10 TABLET ORAL DAILY
Qty: 90 TABLET | Refills: 3 | Status: SHIPPED | OUTPATIENT
Start: 2024-07-31

## 2024-07-31 RX ORDER — FAMOTIDINE 10 MG/1
10 TABLET ORAL 2 TIMES DAILY
Start: 2024-07-31 | End: 2025-01-27

## 2024-07-31 ASSESSMENT — ACTIVITIES OF DAILY LIVING (ADL)
MANAGING_FINANCES: INDEPENDENT
BATHING: INDEPENDENT
DOING_HOUSEWORK: INDEPENDENT
DRESSING: INDEPENDENT
GROCERY_SHOPPING: INDEPENDENT
TAKING_MEDICATION: INDEPENDENT

## 2024-07-31 ASSESSMENT — PATIENT HEALTH QUESTIONNAIRE - PHQ9
SUM OF ALL RESPONSES TO PHQ9 QUESTIONS 1 AND 2: 0
1. LITTLE INTEREST OR PLEASURE IN DOING THINGS: NOT AT ALL
2. FEELING DOWN, DEPRESSED OR HOPELESS: NOT AT ALL

## 2024-07-31 NOTE — PROGRESS NOTES
"Subjective   Reason for Visit: Cortney Crawley is an 68 y.o. female here for a Medicare Wellness visit.     Past Medical, Surgical, and Family History reviewed and updated in chart.         Patient is here for her AWV.  Last A1c was 6.9% on 02.01.24  Today is 6.5%  Patient had Covid in June.  Patient state has an stye on left side eye,  went to opthalmologic was put on ATB, red [place] has not gone away.  Last Mammogram 09.27.23  Colonoscopy 08.01.24    Diabetes  She presents for her follow-up diabetic visit. She has type 2 diabetes mellitus. Her disease course has been stable. Pertinent negatives for diabetes include no chest pain.     GOT COVID AT END OF JUNE, STILL GETTING OVER IT.  WENT TO URGENT CARE.    COLONOSCOPY DOING NEXT MONTH    STOPPED EATING PROCESSED FOOD AND MEATS.  VEGGIE BURGERS AND VEGETABLES    PATIENT UNDERSTANDS OSTEOPOROSIS RISK FROM ANASTROZOLE, BUT WILL NOT TAKE OSTEOPOROSIS MEDS EVER    JUST SAW EYE DOCTOR FOR STYE.        Patient Care Team:  Mario Kamara MD as PCP - General (Internal Medicine)  Mario Kamara MD as PCP - [employer] PCP     Review of Systems   Constitutional:  Negative for chills, diaphoresis and fever.   Respiratory:  Negative for cough and shortness of breath.    Cardiovascular:  Negative for chest pain and leg swelling.   Gastrointestinal:  Negative for constipation, diarrhea, nausea and vomiting.   Musculoskeletal:  Negative for joint swelling and myalgias.   All other systems reviewed and are negative.      Objective   Vitals:  /77 (BP Location: Left arm, Patient Position: Sitting)   Pulse 76   Temp 36.8 °C (98.2 °F)   Resp 16   Ht 1.702 m (5' 7\")   Wt 112 kg (246 lb)   SpO2 97%   BMI 38.53 kg/m²       Physical Exam  Vitals reviewed.   Constitutional:       General: She is not in acute distress.     Appearance: She is obese. She is not ill-appearing.   Cardiovascular:      Rate and Rhythm: Normal rate and regular rhythm.    "   Pulses: Normal pulses.      Heart sounds:      No gallop.   Pulmonary:      Breath sounds: Normal breath sounds. No wheezing, rhonchi or rales.   Abdominal:      General: Abdomen is flat. Bowel sounds are normal.      Palpations: Abdomen is soft.      Tenderness: There is no guarding or rebound.   Musculoskeletal:      Right lower leg: No edema.      Left lower leg: No edema.   Skin:     General: Skin is warm and dry.   Neurological:      General: No focal deficit present.      Mental Status: She is oriented to person, place, and time.   Psychiatric:         Mood and Affect: Mood normal.         Behavior: Behavior normal.         Assessment/Plan   Problem List Items Addressed This Visit             ICD-10-CM    DM2 (diabetes mellitus, type 2) (Multi) E11.9    Relevant Medications    semaglutide (Rybelsus) 14 mg tablet tablet    Other Relevant Orders    Protein, Urine Random    HLD (hyperlipidemia) E78.5    Relevant Medications    pravastatin (Pravachol) 10 mg tablet    Other Relevant Orders    Vitamin B12    Lipid Panel    TSH with reflex to Free T4 if abnormal    Comprehensive Metabolic Panel    CBC    Mild vitamin D deficiency E55.9    Relevant Medications    cholecalciferol (Vitamin D-3) 50 mcg (2,000 unit) capsule    Other Relevant Orders    Vitamin D 25-Hydroxy,Total (for eval of Vitamin D levels)    Obesity, Class II, BMI 35-39.9 E66.9    Malignant neoplasm of breast in female, estrogen receptor positive (Multi) C50.919, Z17.0    Medicare annual wellness visit, subsequent - Primary Z00.00     Other Visit Diagnoses         Codes    Diabetes mellitus without complication (Multi)     E11.9    Relevant Orders    POCT glycosylated hemoglobin (Hb A1C) manually resulted (Completed)    Gastroesophageal reflux disease, unspecified whether esophagitis present     K21.9    Relevant Medications    famotidine (Pepcid) 10 mg tablet    Difficulty walking     R26.2    Relevant Orders    Disability Placard    Primary  osteoarthritis of both knees     M17.0    Relevant Orders    Disability Placard          Patient Instructions    FASTING LABS ARE ORDERED FOR YOU INCLUDING VIT D LEVEL, TO BE DONE WITH ONCOLOGY LABS    2.  A1C TODAY IS 6.5% = EXCELLENT DIABETIC CONTROL    3.  REFILLS HAVE BEEN SENT IN FOR YOU    4.  ONGOING DIET/EXERCISE/WEIGHT MANAGEMENT AS YOU ARE DOING    5.  MAMMOGRAMS SCHEDULED PER ONCOLOGY    6.  FOLLOW UP 6 MONTHS OR AS NEEDED    7.  COLONOSCOPY NEXT MONTH AS SCHEDULED

## 2024-07-31 NOTE — PATIENT INSTRUCTIONS
FASTING LABS ARE ORDERED FOR YOU INCLUDING VIT D LEVEL, TO BE DONE WITH ONCOLOGY LABS    2.  A1C TODAY IS 6.5% = EXCELLENT DIABETIC CONTROL    3.  REFILLS HAVE BEEN SENT IN FOR YOU    4.  ONGOING DIET/EXERCISE/WEIGHT MANAGEMENT AS YOU ARE DOING    5.  MAMMOGRAMS SCHEDULED PER ONCOLOGY    6.  FOLLOW UP 6 MONTHS OR AS NEEDED    7.  COLONOSCOPY NEXT MONTH AS SCHEDULED

## 2024-08-01 ENCOUNTER — LAB (OUTPATIENT)
Dept: LAB | Facility: LAB | Age: 69
End: 2024-08-01
Payer: MEDICARE

## 2024-08-01 DIAGNOSIS — E78.5 HYPERLIPIDEMIA, UNSPECIFIED HYPERLIPIDEMIA TYPE: ICD-10-CM

## 2024-08-01 DIAGNOSIS — Z00.00 MEDICARE ANNUAL WELLNESS VISIT, SUBSEQUENT: ICD-10-CM

## 2024-08-01 DIAGNOSIS — E11.69 TYPE 2 DIABETES MELLITUS WITH OTHER SPECIFIED COMPLICATION, WITHOUT LONG-TERM CURRENT USE OF INSULIN (MULTI): ICD-10-CM

## 2024-08-01 DIAGNOSIS — D75.89 MACROCYTOSIS WITHOUT ANEMIA: ICD-10-CM

## 2024-08-01 DIAGNOSIS — D69.6 THROMBOCYTOPENIA (CMS-HCC): ICD-10-CM

## 2024-08-01 DIAGNOSIS — E55.9 MILD VITAMIN D DEFICIENCY: ICD-10-CM

## 2024-08-01 DIAGNOSIS — E11.9 TYPE 2 DIABETES MELLITUS WITHOUT COMPLICATION, WITHOUT LONG-TERM CURRENT USE OF INSULIN (MULTI): ICD-10-CM

## 2024-08-01 DIAGNOSIS — D72.819 LEUKOPENIA, UNSPECIFIED TYPE: ICD-10-CM

## 2024-08-01 LAB
25(OH)D3 SERPL-MCNC: 34 NG/ML (ref 30–100)
ALBUMIN SERPL BCP-MCNC: 3.3 G/DL (ref 3.4–5)
ALP SERPL-CCNC: 73 U/L (ref 33–136)
ALT SERPL W P-5'-P-CCNC: 19 U/L (ref 7–45)
ANION GAP SERPL CALC-SCNC: 12 MMOL/L (ref 10–20)
AST SERPL W P-5'-P-CCNC: 33 U/L (ref 9–39)
BASOPHILS # BLD AUTO: 0.03 X10*3/UL (ref 0–0.1)
BASOPHILS NFR BLD AUTO: 0.7 %
BILIRUB SERPL-MCNC: 1.1 MG/DL (ref 0–1.2)
BUN SERPL-MCNC: 10 MG/DL (ref 6–23)
CALCIUM SERPL-MCNC: 9.5 MG/DL (ref 8.6–10.3)
CHLORIDE SERPL-SCNC: 104 MMOL/L (ref 98–107)
CHOLEST SERPL-MCNC: 129 MG/DL (ref 0–199)
CHOLESTEROL/HDL RATIO: 3.1
CO2 SERPL-SCNC: 28 MMOL/L (ref 21–32)
CREAT SERPL-MCNC: 0.84 MG/DL (ref 0.5–1.05)
CREAT UR-MCNC: 419.7 MG/DL (ref 20–320)
EGFRCR SERPLBLD CKD-EPI 2021: 75 ML/MIN/1.73M*2
EOSINOPHIL # BLD AUTO: 0.22 X10*3/UL (ref 0–0.7)
EOSINOPHIL NFR BLD AUTO: 5.4 %
ERYTHROCYTE [DISTWIDTH] IN BLOOD BY AUTOMATED COUNT: 13.5 % (ref 11.5–14.5)
FOLATE SERPL-MCNC: 13.7 NG/ML
GLUCOSE SERPL-MCNC: 160 MG/DL (ref 74–99)
HCT VFR BLD AUTO: 42.3 % (ref 36–46)
HCV AB SER QL: NONREACTIVE
HDLC SERPL-MCNC: 41.6 MG/DL
HGB BLD-MCNC: 13.9 G/DL (ref 12–16)
IMM GRANULOCYTES # BLD AUTO: 0.01 X10*3/UL (ref 0–0.7)
IMM GRANULOCYTES NFR BLD AUTO: 0.2 % (ref 0–0.9)
LDLC SERPL CALC-MCNC: 67 MG/DL
LYMPHOCYTES # BLD AUTO: 1.31 X10*3/UL (ref 1.2–4.8)
LYMPHOCYTES NFR BLD AUTO: 32 %
MCH RBC QN AUTO: 33 PG (ref 26–34)
MCHC RBC AUTO-ENTMCNC: 32.9 G/DL (ref 32–36)
MCV RBC AUTO: 101 FL (ref 80–100)
MONOCYTES # BLD AUTO: 0.59 X10*3/UL (ref 0.1–1)
MONOCYTES NFR BLD AUTO: 14.4 %
NEUTROPHILS # BLD AUTO: 1.94 X10*3/UL (ref 1.2–7.7)
NEUTROPHILS NFR BLD AUTO: 47.3 %
NON HDL CHOLESTEROL: 87 MG/DL (ref 0–149)
NRBC BLD-RTO: 0 /100 WBCS (ref 0–0)
PLATELET # BLD AUTO: 127 X10*3/UL (ref 150–450)
POTASSIUM SERPL-SCNC: 4 MMOL/L (ref 3.5–5.3)
PROT SERPL-MCNC: 6.7 G/DL (ref 6.4–8.2)
PROT UR-ACNC: 87 MG/DL (ref 5–24)
PROT/CREAT UR: 0.21 MG/MG CREAT (ref 0–0.17)
RBC # BLD AUTO: 4.21 X10*6/UL (ref 4–5.2)
SODIUM SERPL-SCNC: 140 MMOL/L (ref 136–145)
TRIGL SERPL-MCNC: 103 MG/DL (ref 0–149)
TSH SERPL-ACNC: 2.63 MIU/L (ref 0.44–3.98)
VIT B12 SERPL-MCNC: 1165 PG/ML (ref 211–911)
VLDL: 21 MG/DL (ref 0–40)
WBC # BLD AUTO: 4.1 X10*3/UL (ref 4.4–11.3)

## 2024-08-01 PROCEDURE — 82570 ASSAY OF URINE CREATININE: CPT

## 2024-08-01 PROCEDURE — 80053 COMPREHEN METABOLIC PANEL: CPT

## 2024-08-01 PROCEDURE — 36415 COLL VENOUS BLD VENIPUNCTURE: CPT

## 2024-08-01 PROCEDURE — 84156 ASSAY OF PROTEIN URINE: CPT

## 2024-08-01 PROCEDURE — 85025 COMPLETE CBC W/AUTO DIFF WBC: CPT

## 2024-08-01 PROCEDURE — 80061 LIPID PANEL: CPT

## 2024-08-01 PROCEDURE — 84443 ASSAY THYROID STIM HORMONE: CPT

## 2024-08-01 PROCEDURE — 82306 VITAMIN D 25 HYDROXY: CPT

## 2024-08-01 PROCEDURE — 82746 ASSAY OF FOLIC ACID SERUM: CPT

## 2024-08-01 PROCEDURE — 82607 VITAMIN B-12: CPT

## 2024-08-01 PROCEDURE — 86803 HEPATITIS C AB TEST: CPT

## 2024-08-05 ENCOUNTER — TELEPHONE (OUTPATIENT)
Dept: PRIMARY CARE | Facility: CLINIC | Age: 69
End: 2024-08-05
Payer: MEDICARE

## 2024-08-05 DIAGNOSIS — R80.9 PROTEINURIA, UNSPECIFIED TYPE: ICD-10-CM

## 2024-08-05 DIAGNOSIS — R80.9 ALBUMINURIA: Primary | ICD-10-CM

## 2024-08-05 NOTE — TELEPHONE ENCOUNTER
Pt asking for a call back with her lab results.  Also, would like to come in and  copies of the results.

## 2024-08-06 PROBLEM — Z00.00 MEDICARE ANNUAL WELLNESS VISIT, SUBSEQUENT: Status: ACTIVE | Noted: 2024-08-06

## 2024-08-06 ASSESSMENT — ENCOUNTER SYMPTOMS
DIARRHEA: 0
SHORTNESS OF BREATH: 0
FEVER: 0
MYALGIAS: 0
VOMITING: 0
COUGH: 0
CONSTIPATION: 0
CHILLS: 0
NAUSEA: 0
DIAPHORESIS: 0
JOINT SWELLING: 0

## 2024-09-19 ENCOUNTER — TELEPHONE (OUTPATIENT)
Dept: PRIMARY CARE | Facility: CLINIC | Age: 69
End: 2024-09-19
Payer: MEDICARE

## 2024-09-19 NOTE — TELEPHONE ENCOUNTER
Patient has order foir [facility] and asking what foods she is allowed to eat before the test?  
Universal Safety Interventions

## 2024-09-23 ENCOUNTER — PREP FOR PROCEDURE (OUTPATIENT)
Dept: GASTROENTEROLOGY | Age: 69
End: 2024-09-23

## 2024-09-23 RX ORDER — SODIUM CHLORIDE 0.9 % (FLUSH) 0.9 %
5-40 SYRINGE (ML) INJECTION EVERY 12 HOURS SCHEDULED
Status: CANCELLED | OUTPATIENT
Start: 2024-09-23

## 2024-09-23 RX ORDER — SODIUM CHLORIDE 9 MG/ML
INJECTION, SOLUTION INTRAVENOUS PRN
Status: CANCELLED | OUTPATIENT
Start: 2024-09-23

## 2024-09-23 RX ORDER — SODIUM CHLORIDE 0.9 % (FLUSH) 0.9 %
5-40 SYRINGE (ML) INJECTION PRN
Status: CANCELLED | OUTPATIENT
Start: 2024-09-23

## 2024-09-23 RX ORDER — SODIUM CHLORIDE 9 MG/ML
INJECTION, SOLUTION INTRAVENOUS CONTINUOUS
Status: CANCELLED | OUTPATIENT
Start: 2024-09-23

## 2024-10-01 ENCOUNTER — HOSPITAL ENCOUNTER (OUTPATIENT)
Dept: WOMENS IMAGING | Age: 69
Discharge: HOME | End: 2024-10-03
Payer: MEDICARE

## 2024-10-01 VITALS — HEIGHT: 67 IN | BODY MASS INDEX: 38.85 KG/M2

## 2024-10-01 DIAGNOSIS — Z12.31 ENCOUNTER FOR SCREENING MAMMOGRAM FOR BREAST CANCER: ICD-10-CM

## 2024-10-01 PROCEDURE — 77063 BREAST TOMOSYNTHESIS BI: CPT

## 2024-10-03 ENCOUNTER — TELEPHONE (OUTPATIENT)
Dept: SURGERY | Age: 69
End: 2024-10-03

## 2024-10-03 DIAGNOSIS — R92.8 ABNORMAL MAMMOGRAM: ICD-10-CM

## 2024-10-03 DIAGNOSIS — C50.411 CARCINOMA OF UPPER-OUTER QUADRANT OF RIGHT BREAST IN FEMALE, ESTROGEN RECEPTOR POSITIVE (HCC): Primary | ICD-10-CM

## 2024-10-03 DIAGNOSIS — Z17.0 CARCINOMA OF UPPER-OUTER QUADRANT OF RIGHT BREAST IN FEMALE, ESTROGEN RECEPTOR POSITIVE (HCC): Primary | ICD-10-CM

## 2024-10-03 NOTE — TELEPHONE ENCOUNTER
Received request for diagnostic mammogram from [facility] as patient's screening mammogram is incomplete. Communicated with [facility] that requested order has been placed. We will contact the patient when additional imaging is completed and schedule her follow up accordingly.

## 2024-10-03 NOTE — TELEPHONE ENCOUNTER
----- Message from Dr. Tracy Gustafson MD sent at 10/3/2024 10:49 AM EDT -----  Regarding: needs f/u with me    ----- Message -----  From: Perla Addison Incoming Radiant Results From [facility]  Sent: 10/2/2024   9:37 AM EDT  To: Tracy Gustafson MD

## 2024-10-08 ENCOUNTER — ANESTHESIA (OUTPATIENT)
Dept: ENDOSCOPY | Age: 69
End: 2024-10-08
Payer: MEDICARE

## 2024-10-08 ENCOUNTER — HOSPITAL ENCOUNTER (OUTPATIENT)
Age: 69
Setting detail: OUTPATIENT SURGERY
Discharge: HOME | End: 2024-10-08
Attending: SPECIALIST | Admitting: SPECIALIST
Payer: MEDICARE

## 2024-10-08 ENCOUNTER — ANESTHESIA EVENT (OUTPATIENT)
Dept: ENDOSCOPY | Age: 69
End: 2024-10-08
Payer: MEDICARE

## 2024-10-08 VITALS
HEIGHT: 67 IN | HEART RATE: 76 BPM | TEMPERATURE: 97.5 F | RESPIRATION RATE: 18 BRPM | OXYGEN SATURATION: 96 % | WEIGHT: 245 LBS | SYSTOLIC BLOOD PRESSURE: 129 MMHG | BODY MASS INDEX: 38.45 KG/M2 | DIASTOLIC BLOOD PRESSURE: 61 MMHG

## 2024-10-08 DIAGNOSIS — Z86.0100 HISTORY OF COLON POLYPS: ICD-10-CM

## 2024-10-08 LAB
GLUCOSE BLD-MCNC: 125 MG/DL (ref 70–99)
PERFORMED ON: ABNORMAL

## 2024-10-08 PROCEDURE — 6360000002 HC RX W HCPCS: Performed by: NURSE ANESTHETIST, CERTIFIED REGISTERED

## 2024-10-08 PROCEDURE — 3700000001 HC ADD 15 MINUTES (ANESTHESIA): Performed by: SPECIALIST

## 2024-10-08 PROCEDURE — 3700000000 HC ANESTHESIA ATTENDED CARE: Performed by: SPECIALIST

## 2024-10-08 PROCEDURE — 7100000010 HC PHASE II RECOVERY - FIRST 15 MIN: Performed by: SPECIALIST

## 2024-10-08 PROCEDURE — 2580000003 HC RX 258: Performed by: SPECIALIST

## 2024-10-08 PROCEDURE — 2709999900 HC NON-CHARGEABLE SUPPLY: Performed by: SPECIALIST

## 2024-10-08 PROCEDURE — 7100000011 HC PHASE II RECOVERY - ADDTL 15 MIN: Performed by: SPECIALIST

## 2024-10-08 PROCEDURE — 2580000003 HC RX 258

## 2024-10-08 PROCEDURE — 2500000003 HC RX 250 WO HCPCS: Performed by: NURSE ANESTHETIST, CERTIFIED REGISTERED

## 2024-10-08 PROCEDURE — 3609027000 HC COLONOSCOPY: Performed by: SPECIALIST

## 2024-10-08 PROCEDURE — 88305 TISSUE EXAM BY PATHOLOGIST: CPT

## 2024-10-08 PROCEDURE — 45385 COLONOSCOPY W/LESION REMOVAL: CPT | Performed by: SPECIALIST

## 2024-10-08 RX ORDER — SODIUM CHLORIDE 9 MG/ML
INJECTION, SOLUTION INTRAVENOUS PRN
Status: DISCONTINUED | OUTPATIENT
Start: 2024-10-08 | End: 2024-10-08 | Stop reason: HOSPADM

## 2024-10-08 RX ORDER — SODIUM CHLORIDE 9 MG/ML
INJECTION, SOLUTION INTRAVENOUS CONTINUOUS
Status: DISCONTINUED | OUTPATIENT
Start: 2024-10-08 | End: 2024-10-08 | Stop reason: HOSPADM

## 2024-10-08 RX ORDER — SODIUM CHLORIDE 0.9 % (FLUSH) 0.9 %
5-40 SYRINGE (ML) INJECTION PRN
Status: DISCONTINUED | OUTPATIENT
Start: 2024-10-08 | End: 2024-10-08 | Stop reason: HOSPADM

## 2024-10-08 RX ORDER — SODIUM CHLORIDE 9 MG/ML
INJECTION, SOLUTION INTRAVENOUS
Status: COMPLETED
Start: 2024-10-08 | End: 2024-10-08

## 2024-10-08 RX ORDER — LIDOCAINE HYDROCHLORIDE 20 MG/ML
INJECTION, SOLUTION EPIDURAL; INFILTRATION; INTRACAUDAL; PERINEURAL
Status: DISCONTINUED | OUTPATIENT
Start: 2024-10-08 | End: 2024-10-08 | Stop reason: SDUPTHER

## 2024-10-08 RX ORDER — SODIUM CHLORIDE 0.9 % (FLUSH) 0.9 %
5-40 SYRINGE (ML) INJECTION EVERY 12 HOURS SCHEDULED
Status: DISCONTINUED | OUTPATIENT
Start: 2024-10-08 | End: 2024-10-08 | Stop reason: HOSPADM

## 2024-10-08 RX ORDER — PROPOFOL 10 MG/ML
INJECTION, EMULSION INTRAVENOUS
Status: DISCONTINUED | OUTPATIENT
Start: 2024-10-08 | End: 2024-10-08 | Stop reason: SDUPTHER

## 2024-10-08 RX ADMIN — SODIUM CHLORIDE: 9 INJECTION, SOLUTION INTRAVENOUS at 06:58

## 2024-10-08 RX ADMIN — PROPOFOL 50 MG: 10 INJECTION, EMULSION INTRAVENOUS at 07:50

## 2024-10-08 RX ADMIN — PROPOFOL 50 MG: 10 INJECTION, EMULSION INTRAVENOUS at 07:55

## 2024-10-08 RX ADMIN — LIDOCAINE HYDROCHLORIDE 50 MG: 20 INJECTION, SOLUTION EPIDURAL; INFILTRATION; INTRACAUDAL; PERINEURAL at 07:39

## 2024-10-08 RX ADMIN — PROPOFOL 50 MG: 10 INJECTION, EMULSION INTRAVENOUS at 07:40

## 2024-10-08 RX ADMIN — PROPOFOL 50 MG: 10 INJECTION, EMULSION INTRAVENOUS at 07:41

## 2024-10-08 RX ADMIN — PROPOFOL 50 MG: 10 INJECTION, EMULSION INTRAVENOUS at 07:45

## 2024-10-08 RX ADMIN — PROPOFOL 100 MG: 10 INJECTION, EMULSION INTRAVENOUS at 07:39

## 2024-10-08 ASSESSMENT — PAIN - FUNCTIONAL ASSESSMENT
PAIN_FUNCTIONAL_ASSESSMENT: 0-10
PAIN_FUNCTIONAL_ASSESSMENT: NONE - DENIES PAIN

## 2024-10-08 NOTE — ANESTHESIA PRE PROCEDURE
Department of Anesthesiology  Preprocedure Note       Name:  Kate Khan   Age:  68 y.o.  :  1955                                          MRN:  [id number]         Date:  10/8/2024      Surgeon: Surgeon(s):  King Alcala MD    Procedure: Procedure(s):  COLORECTAL CANCER SCREENING, HIGH RISK    Medications prior to admission:   Prior to Admission medications    Medication Sig Start Date End Date Taking? Authorizing Provider   acetaminophen (TYLENOL) 650 MG extended release tablet Take 1 tablet by mouth every 8 hours as needed for Pain   Yes Lisandro Romero MD   fexofenadine (ALLEGRA ALLERGY) 60 MG tablet Take 1 tablet by mouth daily  Patient not taking: Reported on 10/8/2024    Lisandro Romero MD   anastrozole (ARIMIDEX) 1 MG tablet Take 1 tablet by mouth daily 23   Lisandro Romero MD   Cholecalciferol (VITAMIN D3) 50 MCG (2000) CAPS Take 1 capsule by mouth daily    Lisandro Romero MD   NONFORMULARY Bonine (OTC used for vertigo)    Lisandro Romero MD   Cyanocobalamin (VITAMIN B-12 PO) Take by mouth    Lisandro Romero MD   Semaglutide (RYBELSUS) 14 MG TABS Take by mouth    Lisandro Romero MD   pravastatin (PRAVACHOL) 10 MG tablet Take 1 tablet by mouth daily 22   Lisandro Romero MD   clobetasol prop emollient base 0.05 % CREA Apply 1 Dose topically daily as needed 10/28/19   Lisandro Romero MD   Famotidine (PEPCID PO) Take 10 mg by mouth daily    Lisandro Romero MD       Current medications:    Current Facility-Administered Medications   Medication Dose Route Frequency Provider Last Rate Last Admin   • 0.9 % sodium chloride infusion   IntraVENous Continuous King Alcala MD 75 mL/hr at 10/08/24 0658 New Bag at 10/08/24 0658   • sodium chloride flush 0.9 % injection 5-40 mL  5-40 mL IntraVENous 2 times per day King Alcala MD       • sodium chloride flush 0.9 % injection 5-40 mL  5-40 mL IntraVENous PRN King Alcala MD

## 2024-10-08 NOTE — H&P
Patient Name: Kate Khan  : 1955  MRN: [id number]  DATE: 10/08/24      ENDOSCOPY  History and Physical    Procedure:    [x] Diagnostic Colonoscopy       [] Screening Colonoscopy  [] EGD      [] ERCP      [] EUS       [] Other    [x] Previous office notes/History and Physical reviewed from the patients chart. Please see EMR for further details of HPI. I have examined the patient's status immediately prior to the procedure and:      Indications/HPI:    []Abdominal Pain  []Cancer- GI/Lung  []Fhx of colon CA/polyps  []History of Polyps  []Hernandez’s   []Melena  []Abnormal Imaging  []Dysphagia    []Persistent Pneumonia  []Anemia  []Food Impaction  []History of Polyps  []GI Bleed  []Pulmonary nodule/Mass  []Change in bowel habits []Heartburn/Reflux  []Rectal Bleed (BRBPR)  []Chest Pain - Non Cardiac []Heme (+) Stoo  l[]Ulcers  []Constipation  []Hemoptysis   []Varices  []Diarrhea  []Hypoxemia  []Nausea/Vomiting  []Screening   []Crohns/Colitis  []Other: h/o colon polyps.    Anesthesia:   [x] MAC [] Moderate Sedation   [] General   [] None     ROS: 12 pt Review of Symptoms was negative unless mentioned above    Medications:   Prior to Admission medications    Medication Sig Start Date End Date Taking? Authorizing Provider   acetaminophen (TYLENOL) 650 MG extended release tablet Take 1 tablet by mouth every 8 hours as needed for Pain   Yes Lisandro Romero MD   fexofenadine (ALLEGRA ALLERGY) 60 MG tablet Take 1 tablet by mouth daily  Patient not taking: Reported on 10/8/2024    Lisandro Romero MD   anastrozole (ARIMIDEX) 1 MG tablet Take 1 tablet by mouth daily 23   Lisandro Romero MD   Cholecalciferol (VITAMIN D3) 50 MCG (2000) CAPS Take 1 capsule by mouth daily    Lisandro Romero MD NONFORMULARY Bonine (OTC used for vertigo)    Lisandro Romero MD   Cyanocobalamin (VITAMIN B-12 PO) Take by mouth    Lisandro Romero MD   Semaglutide (RYBELSUS) 14 MG TABS Take by mouth

## 2024-10-14 ENCOUNTER — HOSPITAL ENCOUNTER (OUTPATIENT)
Dept: ULTRASOUND IMAGING | Age: 69
Discharge: HOME | End: 2024-10-16
Payer: MEDICARE

## 2024-10-14 ENCOUNTER — HOSPITAL ENCOUNTER (OUTPATIENT)
Dept: WOMENS IMAGING | Age: 69
Discharge: HOME | End: 2024-10-16
Payer: MEDICARE

## 2024-10-14 DIAGNOSIS — R92.8 ABNORMAL MAMMOGRAM: ICD-10-CM

## 2024-10-14 DIAGNOSIS — C50.411 CARCINOMA OF UPPER-OUTER QUADRANT OF RIGHT BREAST IN FEMALE, ESTROGEN RECEPTOR POSITIVE (HCC): ICD-10-CM

## 2024-10-14 DIAGNOSIS — Z17.0 CARCINOMA OF UPPER-OUTER QUADRANT OF RIGHT BREAST IN FEMALE, ESTROGEN RECEPTOR POSITIVE (HCC): ICD-10-CM

## 2024-10-14 PROCEDURE — 76642 ULTRASOUND BREAST LIMITED: CPT

## 2024-10-14 PROCEDURE — G0279 TOMOSYNTHESIS, MAMMO: HCPCS

## 2024-10-24 ENCOUNTER — TELEPHONE (OUTPATIENT)
Dept: PRIMARY CARE | Facility: CLINIC | Age: 69
End: 2024-10-24

## 2024-10-24 ENCOUNTER — LAB (OUTPATIENT)
Dept: LAB | Facility: LAB | Age: 69
End: 2024-10-24
Payer: MEDICARE

## 2024-10-24 DIAGNOSIS — R80.9 PROTEINURIA, UNSPECIFIED TYPE: ICD-10-CM

## 2024-10-24 LAB
CREAT UR-MCNC: 140.4 MG/DL (ref 20–320)
PROT UR-ACNC: 7 MG/DL (ref 5–24)
PROT/CREAT UR: 0.05 MG/MG CREAT (ref 0–0.17)

## 2024-10-24 PROCEDURE — 84156 ASSAY OF PROTEIN URINE: CPT

## 2024-10-24 PROCEDURE — 82570 ASSAY OF URINE CREATININE: CPT

## 2024-10-24 NOTE — TELEPHONE ENCOUNTER
Pt left VM stating she just dropped  off urine sample at the hospital.  They told her results would be sent to us in next couple of hours.      Patient asking that we freda her today with results as soon as possible.  She said she is very nervous because last one was very high.       Please review results and advise.  Thanks!

## 2024-11-18 ENCOUNTER — OFFICE VISIT (OUTPATIENT)
Dept: SURGERY | Age: 69
End: 2024-11-18
Payer: MEDICARE

## 2024-11-18 VITALS
HEIGHT: 67 IN | DIASTOLIC BLOOD PRESSURE: 76 MMHG | HEART RATE: 68 BPM | SYSTOLIC BLOOD PRESSURE: 148 MMHG | BODY MASS INDEX: 38.77 KG/M2 | TEMPERATURE: 97.2 F | WEIGHT: 247 LBS | OXYGEN SATURATION: 99 % | RESPIRATION RATE: 16 BRPM

## 2024-11-18 DIAGNOSIS — R92.8 ABNORMAL MAMMOGRAM OF RIGHT BREAST: ICD-10-CM

## 2024-11-18 DIAGNOSIS — C50.411 CARCINOMA OF UPPER-OUTER QUADRANT OF RIGHT BREAST IN FEMALE, ESTROGEN RECEPTOR POSITIVE (HCC): Primary | ICD-10-CM

## 2024-11-18 DIAGNOSIS — Z17.0 CARCINOMA OF UPPER-OUTER QUADRANT OF RIGHT BREAST IN FEMALE, ESTROGEN RECEPTOR POSITIVE (HCC): Primary | ICD-10-CM

## 2024-11-18 DIAGNOSIS — N64.9 BREAST DISORDER: ICD-10-CM

## 2024-11-18 DIAGNOSIS — N63.41 SUBAREOLAR MASS OF RIGHT BREAST: ICD-10-CM

## 2024-11-18 PROCEDURE — 19083 BX BREAST 1ST LESION US IMAG: CPT | Performed by: SURGERY

## 2024-11-18 RX ORDER — CETIRIZINE HYDROCHLORIDE 10 MG/1
10 TABLET ORAL DAILY PRN
COMMUNITY

## 2024-11-18 RX ORDER — LIDOCAINE HYDROCHLORIDE AND EPINEPHRINE 10; 10 MG/ML; UG/ML
10 INJECTION, SOLUTION INFILTRATION; PERINEURAL ONCE
Status: COMPLETED | OUTPATIENT
Start: 2024-11-18 | End: 2024-11-18

## 2024-11-18 RX ADMIN — Medication 3 MEQ: at 13:02

## 2024-11-18 RX ADMIN — LIDOCAINE HYDROCHLORIDE AND EPINEPHRINE 10 ML: 10; 10 INJECTION, SOLUTION INFILTRATION; PERINEURAL at 13:02

## 2024-11-20 NOTE — PROGRESS NOTES
Reason for today's visit:  6 month follow up right breast cancer. Continues on Anastrazole daily. BIRADS 3 right breast on DX Mamm/US right breast 10/14/2024    Palpates a breast change? yes - ringht inferior breast  Nipple discharge: no  Breast Pain: no  Breast Mass: yes - right inferior breast  Swelling in either upper extremity? no    Wellness:    Self breast self exams: yes   Regular exercise routine: yes - walks  Following Lymphedema awareness/precautions: yes   Managing stress:yes   Stress level on a scale of 1 - 10: 4    Instruction:    Follow up per provider  Imaging per provider  Monthly self breast exams  Healthy diet  Exercise program  Lymphedema precautions/awareness    Other:    Requisitions needed:no  Bras/prosthesis: no  Compression Sleeve/glove: no  Lymphedema Measurements: see flow sheet  Therapy: no  PT/OT/Lymphedema: no  Follow up as advised per Dr Gustafson      
       Judgment: Judgment normal.                   IMAGING:     See epic     Assessment:       ICD-10-CM    1. Carcinoma of upper-outer quadrant of right breast in female, estrogen receptor positive (HCC)  C50.411     Z17.0       2. Abnormal mammogram of right breast  R92.8 Surgical Pathology     lidocaine-EPINEPHrine 1 %-[id number] injection 10 mL     sodium bicarbonate 8.4 % injection 3 mEq      3. Breast disorder  N64.9             Plan:     We discussed the clinical exam and the imaging findings.  I recommended an ultrasound guided core needle biopsy and possible clip placement.  She was told that 95% of the time we can obtain a diagnosis.  If the results are non-diagnostic she may require a more extensive biopsy. If more tissue is needed for diagnosis, the lesion might require removal of the lesion in the Operating Room.  The patient agreed to undergo the ultrasound guided fine needle aspiration and biopsy in the office setting understanding the risks of bleeding and infection.  See ultrasound report.        2024    12:00 AM 2023    11:00 AM   Ambulatory Procedure Time Out   Correct Patient Yes Yes   Correct Procedure Yes Yes   Correct Site/Side Yes Yes   Correct Site(s) Marked Yes Yes   Informed Consent Signed Yes Yes   Allergies Verified Yes Yes   Staff Present & Credential: bakari gill rn bakari gill rn          Ultrasound Guided Right Breast Core Needle Biopsy and Clip Placement    PATIENT:  XIOMY BERG     DATE: 2024    :      1955     INDICATION:  Right Breast Mass and Palpable area of Concern    LOCATION: [facility]    DESCRIPTION:    A timeout was performed immediately prior to the start of the Ultrasound Guided Right Breast Core Needle Biopsy and Clip Placement procedure and included the correct patient (two identifiers), correct procedure and correct site(s).  Procedure consent and allergies were also verified.   The patient understood

## 2024-11-21 ENCOUNTER — TELEPHONE (OUTPATIENT)
Dept: SURGERY | Age: 69
End: 2024-11-21

## 2024-11-21 DIAGNOSIS — N64.9 BREAST DISORDER: Primary | ICD-10-CM

## 2024-11-21 DIAGNOSIS — N63.41 SUBAREOLAR MASS OF RIGHT BREAST: ICD-10-CM

## 2024-11-21 DIAGNOSIS — Z17.0 CARCINOMA OF UPPER-OUTER QUADRANT OF RIGHT BREAST IN FEMALE, ESTROGEN RECEPTOR POSITIVE (HCC): ICD-10-CM

## 2024-11-21 DIAGNOSIS — C50.411 CARCINOMA OF UPPER-OUTER QUADRANT OF RIGHT BREAST IN FEMALE, ESTROGEN RECEPTOR POSITIVE (HCC): ICD-10-CM

## 2024-11-21 NOTE — TELEPHONE ENCOUNTER
----- Message from Dr. Tracy Gustafson MD sent at 11/21/2024 10:23 AM EST -----  Regarding: call with path - benign and concordant    ----- Message -----  From: Perla Addison Incoming Lab Results From [facility]  Sent: 11/21/2024   9:56 AM EST  To: Tracy Gustafson MD

## 2024-11-21 NOTE — TELEPHONE ENCOUNTER
I called the patient and notified her that her Right Breast Core Biopsy pathology results are benign and are concordant with Dr. Gustafson's findings.The patient verbalized understanding of her test results.  The patient educated on the importance of:   Right Breast Diagnostic Mammogram and a right breast US in 6 months  clinical breast exam in the breast clinic in 6 months  Breast self-awareness  An active lifestyle, healthy diet, limited alcohol intake, achieve and maintain a healthy BMI to optimize breast cancer outcomes.  5.Return to Dr. Gustafson if you have any new breast shape, nipple discharge, or breast lump.     The patient verbalized understanding of her benign results, new orders and 6 month follow up in the office.I scheduled the patient's 6 month follow up appointment. She is aware of the date/time/location of the appointment.I gave the patient the phone number for central scheduling to schedule the breast imaging. The patient is aware to call the office with any questions or concerns.

## 2025-02-05 ENCOUNTER — APPOINTMENT (OUTPATIENT)
Dept: PRIMARY CARE | Facility: CLINIC | Age: 70
End: 2025-02-05
Payer: MEDICARE

## 2025-02-05 VITALS
WEIGHT: 242 LBS | HEIGHT: 67 IN | HEART RATE: 82 BPM | DIASTOLIC BLOOD PRESSURE: 80 MMHG | OXYGEN SATURATION: 100 % | RESPIRATION RATE: 14 BRPM | BODY MASS INDEX: 37.98 KG/M2 | SYSTOLIC BLOOD PRESSURE: 132 MMHG

## 2025-02-05 DIAGNOSIS — K21.9 GASTROESOPHAGEAL REFLUX DISEASE, UNSPECIFIED WHETHER ESOPHAGITIS PRESENT: ICD-10-CM

## 2025-02-05 DIAGNOSIS — C50.919 MALIGNANT NEOPLASM OF BREAST IN FEMALE, ESTROGEN RECEPTOR POSITIVE, UNSPECIFIED LATERALITY, UNSPECIFIED SITE OF BREAST: ICD-10-CM

## 2025-02-05 DIAGNOSIS — E11.69 TYPE 2 DIABETES MELLITUS WITH OTHER SPECIFIED COMPLICATION, WITHOUT LONG-TERM CURRENT USE OF INSULIN: Primary | ICD-10-CM

## 2025-02-05 DIAGNOSIS — E11.9 DIABETES MELLITUS WITHOUT COMPLICATION (MULTI): ICD-10-CM

## 2025-02-05 DIAGNOSIS — E66.01 OBESITY, MORBID (MULTI): ICD-10-CM

## 2025-02-05 DIAGNOSIS — Z17.0 MALIGNANT NEOPLASM OF BREAST IN FEMALE, ESTROGEN RECEPTOR POSITIVE, UNSPECIFIED LATERALITY, UNSPECIFIED SITE OF BREAST: ICD-10-CM

## 2025-02-05 DIAGNOSIS — E78.5 HYPERLIPIDEMIA, UNSPECIFIED HYPERLIPIDEMIA TYPE: ICD-10-CM

## 2025-02-05 LAB — POC HEMOGLOBIN A1C: 6.5 % (ref 4.2–6.5)

## 2025-02-05 PROCEDURE — 3075F SYST BP GE 130 - 139MM HG: CPT | Performed by: INTERNAL MEDICINE

## 2025-02-05 PROCEDURE — 83036 HEMOGLOBIN GLYCOSYLATED A1C: CPT | Performed by: INTERNAL MEDICINE

## 2025-02-05 PROCEDURE — 1036F TOBACCO NON-USER: CPT | Performed by: INTERNAL MEDICINE

## 2025-02-05 PROCEDURE — 1123F ACP DISCUSS/DSCN MKR DOCD: CPT | Performed by: INTERNAL MEDICINE

## 2025-02-05 PROCEDURE — 3008F BODY MASS INDEX DOCD: CPT | Performed by: INTERNAL MEDICINE

## 2025-02-05 PROCEDURE — 3079F DIAST BP 80-89 MM HG: CPT | Performed by: INTERNAL MEDICINE

## 2025-02-05 PROCEDURE — 1159F MED LIST DOCD IN RCRD: CPT | Performed by: INTERNAL MEDICINE

## 2025-02-05 PROCEDURE — 99214 OFFICE O/P EST MOD 30 MIN: CPT | Performed by: INTERNAL MEDICINE

## 2025-02-05 RX ORDER — PRAVASTATIN SODIUM 10 MG/1
10 TABLET ORAL DAILY
Qty: 90 TABLET | Refills: 3 | Status: SHIPPED | OUTPATIENT
Start: 2025-02-05

## 2025-02-05 RX ORDER — FAMOTIDINE 10 MG/1
10 TABLET ORAL 2 TIMES DAILY
Start: 2025-02-05

## 2025-02-05 ASSESSMENT — ENCOUNTER SYMPTOMS
COUGH: 0
DIAPHORESIS: 0
CONSTIPATION: 0
FEVER: 0
DIARRHEA: 0
JOINT SWELLING: 0
NAUSEA: 0
VOMITING: 0
CHILLS: 0
MYALGIAS: 0
SHORTNESS OF BREATH: 0

## 2025-02-05 ASSESSMENT — PATIENT HEALTH QUESTIONNAIRE - PHQ9
1. LITTLE INTEREST OR PLEASURE IN DOING THINGS: NOT AT ALL
2. FEELING DOWN, DEPRESSED OR HOPELESS: NOT AT ALL
SUM OF ALL RESPONSES TO PHQ9 QUESTIONS 1 AND 2: 0

## 2025-02-05 NOTE — PROGRESS NOTES
"Subjective   Cortney Crawley is a 69 y.o. female who presents for FOLLOW UP .    HPI   NO MAJOR TROUBLES, EXCEPT HAVING ARTHRITIS    ARTHRITIS IN THE 5TH DIGITS    WHEN GET COLD THEY ACHE.  ARTHRITIS GLOVES HELP.  NO WHITE OR BLUE COLOR TO THE FINGERS, PUTS GLOVE ON BEFORE THEY BECOME PAINFUL    TAKE PEPCID ON AS NEEDED BASIS      Review of Systems   Constitutional:  Negative for chills, diaphoresis and fever.   Respiratory:  Negative for cough and shortness of breath.    Cardiovascular:  Negative for chest pain and leg swelling.   Gastrointestinal:  Negative for constipation, diarrhea, nausea and vomiting.   Musculoskeletal:  Negative for joint swelling and myalgias.   All other systems reviewed and are negative.      Objective   /80   Pulse 82   Resp 14   Ht 1.702 m (5' 7\")   Wt 110 kg (242 lb)   SpO2 100%   BMI 37.90 kg/m²     Physical Exam  Vitals reviewed.   Constitutional:       General: She is not in acute distress.     Appearance: She is obese. She is not ill-appearing.   Cardiovascular:      Rate and Rhythm: Normal rate and regular rhythm.      Pulses: Normal pulses.      Heart sounds:      No gallop.   Pulmonary:      Breath sounds: Normal breath sounds. No wheezing, rhonchi or rales.   Abdominal:      General: Abdomen is flat. Bowel sounds are normal.      Palpations: Abdomen is soft.      Tenderness: There is no guarding or rebound.   Musculoskeletal:      Right lower leg: No edema.      Left lower leg: No edema.      Comments: NO DIGITAL ULCERATIONS ARE SEEN   Skin:     General: Skin is warm and dry.   Neurological:      General: No focal deficit present.      Mental Status: She is oriented to person, place, and time.   Psychiatric:         Mood and Affect: Mood normal.         Behavior: Behavior normal.         Assessment/Plan   Problem List Items Addressed This Visit       DM2 (diabetes mellitus, type 2) (Multi)    Relevant Medications    semaglutide (Rybelsus) 14 mg tablet tablet    " Other Relevant Orders    Referral to Clinical Pharmacy    HLD (hyperlipidemia)    Relevant Medications    pravastatin (Pravachol) 10 mg tablet    Malignant neoplasm of breast in female, estrogen receptor positive     FOLLOW WITH ONCOLOGY         Obesity, morbid (Multi)     DIET/EXERCISE/WEIGHT LOSS RECOMMENDED          Other Visit Diagnoses       Diabetes mellitus without complication (Multi)        Relevant Orders    POCT glycosylated hemoglobin (Hb A1C) manually resulted    Gastroesophageal reflux disease, unspecified whether esophagitis present        Relevant Medications    famotidine (Pepcid) 10 mg tablet          Patient Instructions    A1C TODAY 6.5% = EXCELLENT DIABETIC CONTROL    2.  DIET/EXERCISE/WEIGHT MANAGEMENT ARE RECOMMENDED FOR YOU    3.  LABS REVIEWED LOOK EXCELLENT AS THEY ARE    4.  REFILLS ARE SENT IN FOR YOU    5.  IF YOU WISH TO EXPLORE CHANGE TO INJECTABLE SEMAGLUTIDE RATHER THAN THE ORAL FORM OF SEMAGLUTIDE (RYBELSUS) IN ORDER TO CAPITALIZE ON ITS AUGMENTED WEIGHT LOSS EFFECT, PLEASE LET ME KNOW AND I CAN HAVE THE PHARMACY CONSULTANT EXPLORE THIS AVENUE WITH YOU    6.  FOLLOW UP 6 MONTHS OR AS NEEDED    7.  INCREASING PROTEIN INTAKE, IF NOT LEAN MEATS, THEN BEANS LIKE RED, KIDNEY, OR DELCID BEANS, CAN HELP IMPROVE ALBUMIN LEVEL.

## 2025-02-05 NOTE — PATIENT INSTRUCTIONS
A1C TODAY 6.5% = EXCELLENT DIABETIC CONTROL    2.  DIET/EXERCISE/WEIGHT MANAGEMENT ARE RECOMMENDED FOR YOU    3.  LABS REVIEWED LOOK EXCELLENT AS THEY ARE    4.  REFILLS ARE SENT IN FOR YOU    5.  IF YOU WISH TO EXPLORE CHANGE TO INJECTABLE SEMAGLUTIDE RATHER THAN THE ORAL FORM OF SEMAGLUTIDE (RYBELSUS) IN ORDER TO CAPITALIZE ON ITS AUGMENTED WEIGHT LOSS EFFECT, PLEASE LET ME KNOW AND I CAN HAVE THE PHARMACY CONSULTANT EXPLORE THIS AVENUE WITH YOU    6.  FOLLOW UP 6 MONTHS OR AS NEEDED    7.  INCREASING PROTEIN INTAKE, IF NOT LEAN MEATS, THEN BEANS LIKE RED, KIDNEY, OR DELCID BEANS, CAN HELP IMPROVE ALBUMIN LEVEL.

## 2025-02-10 ENCOUNTER — APPOINTMENT (OUTPATIENT)
Dept: PHARMACY | Facility: HOSPITAL | Age: 70
End: 2025-02-10
Payer: MEDICARE

## 2025-02-10 DIAGNOSIS — E11.69 TYPE 2 DIABETES MELLITUS WITH OTHER SPECIFIED COMPLICATION, WITHOUT LONG-TERM CURRENT USE OF INSULIN: ICD-10-CM

## 2025-02-10 NOTE — ASSESSMENT & PLAN NOTE
Patient's goal A1c is < 7%.  Is pt at goal? Yes, 6.5% on 2025  Patient's SMBGs are at goal.     Rationale for plan: Patient is currently taking Rybelsus 14 mg daily and tolerating medication well with no adverse effects reported. Patient's postprandial blood sugar readings are at goal. Patient eats a very healthy and balanced diet and participates in regular physical activity. Patient reports paying ~$300 per month for Rybelsus. Based on patient's reported financials, patient likely qualifies for  PAP. Will submit application for  PAP and send prescription for Rybelsus to [facility]. Will follow-up with patient upon determination of  PAP.    Medication Changes:  CONTINUE  Rybelsus 14 mg daily      Future Considerations:  Consider starting a SGLT-2i if needed    Monitoring and Education:  Diabetes Education  Rule of 15: eating ~15 g of carbs when BG less than 80 (half cup juice, 3-4 glucose tabs).  Recognize symptoms of high and low blood sugar.   Eat a realistic healthy diet consisting of fruits, vegetables, fiber, protein food choices on a regular basis and be aware of portion/serving sizes. Reduce carbohydrate consumption and always consume with protein and fat. Avoid foods high in saturated/trans fat, high salt content, and sweets and beverages with added sugars.  Limit alcohol consumption; alcohol may affect your blood sugar and cause hypoglycemia.   Stay active and incorporate ~30 mins of exercise into your daily routine to manage your weight and increase the body's acceptance of insulin.    PATIENT GOALS   Fasting B - 130 mg/dL 2-HR Postprandial BG:   Less than 180 mg/dL A1c:   Less than 7.0 %     Rybelsus Education  Counseled patient on Rybelsus MOA, expectations, side effects, duration of therapy, administration, and monitoring parameters.   Administer on an empty stomach, at least 30 minutes before the first food, beverage, or other oral medications of the day with =4 oz of plain  water only. The  recommends eating 30 to 60 minutes after the dose.  If there is a missed dose, then this dose should be skipped; resume at the next scheduled dose.  Counseled patient to avoid foods that are fatty/oily as this may precipitate the nausea/GI upset that may occur with new start Rybelsus.  Reviewed Rybelsus titration schedule, starting with 3mg once daily for 30 days, then increase to 7 mg once daily; may increase to 14 mg once daily after 30 days on the 7 mg dose if needed to achieve glycemic goals.  The lower initial dose (3 mg daily) is intended to reduce GI symptoms; it does not provide effective glycemic control  Counseled patient on the benefits of GLP-1ra, such as cardiovascular risk reduction, glycemic control, and weight loss potential.  Advised patient that they may experience improved satiety after meals and portion sizes of meals may be reduced as doses of Rybelsus increase

## 2025-02-10 NOTE — PROGRESS NOTES
Clinical Pharmacy Appointment    Patient ID: Cortney Crawley is a 69 y.o. female who presents for Diabetes.    Pt is here for First appointment.     Referring Provider: Mario Kamara, *  PCP: Mario Kamara MD   Last visit with PCP: 2/5/2025   Next visit with PCP: 7/31/2025      Subjective     Interval History  Reports paying ~$300/month for Rybelsus    HPI  DIABETES MELLITUS TYPE 2:    Diagnosed with diabetes: ~10 years ago . Known diabetic complications: HLD, obesity, retinopathy.  Does patient follow with Endocrinology: No  Last optometry exam: 9/9/2024  Most recent visit in Podiatry: none-- patient denies sores or cuts on feet today      Current diabetic medications include:  Rybelsus 14 mg daily    Clarifications to above regimen: none  Adverse Effects: none    Past diabetic medications include:  Metformin- caused lots of itching all over body   Januvia- does not remember taking    Glucose Readings:  Glucometer/CGM Type: OneTouch Verio  Patient tests BG 1 time per day  - checks 2 hours after breakfast      Current home BG readings (mg/dL): 138, 176, 164, 155, 139, 137, 139, 121, 146, 137    Previous home BG readings (mg/dL): none    Any episodes of hypoglycemia? No,   .  Did patient treat episode of hypoglycemia appropriately? N/A  Does the patient have a prescription for ready-to-use Glucagon? Not on insulin    Lifestyle:  Diet: 3 meals/day.   BK: veggie burger + slice of cheese  LN: rice + beans + greens  DN: cup of oatmeal   Snacks: rice cake   Drinks: water only   Physical Activity: has a treadmill at home and goes for walks in the grocery stores   Tobacco history: none  Alcohol use: none    Secondary Prevention:  Statin? Yes  ACE-I/ARB? No  Aspirin? No    Pertinent PMH Review:  PMH of Pancreatitis: No  PMH of Retinopathy: Yes  PMH of Urinary Tract Infections: No- only when younger  PMH of MTC: No  UACR/EGFR in last year?: Yes  No results found for:  "\"MICROALBCREA\"    Immunizations:  Influenza? No  COVID? No  Pneumonia? Yes  Shingles? Yes     Patient Assistance Screening (VAF)  Patient verbally reports monthly or yearly income which is less than 400% federal poverty level  Application for program has been submitted for the following medications:   Rybelsus 14 mg   Patient aware this process may take up to 2 weeks once income documents have been sent to the team.  If approved, [facility] and may be picked up or mailed to patient.   If approved, medication will be billed through insurance, and patient assistance team will pay the copay. This will result in a $0 copay for the patient.  Counseled patient on mechanism of action, side effects, contraindications, and what to do if the patient misses a dose. All patients questions were answered.       Medication Reconciliation:  Changed: N/A  Added: N/A  Discontinued: N/A    Drug Interactions  No relevant drug interactions were noted.    Medication System Management  [facility]  Adherence/Organization: not assessed this visit  Affordability/Accessibility: reports high cost of Eliquis, will apply for  PAP      Objective   Allergies   Allergen Reactions    Corticosteroids (Glucocorticoids) Other    Metformin Other    Miconazole Nitrate-Zinc Ox-Pet Unknown    Penicillins Other    Sitagliptin Unknown     Social History     Social History Narrative    Not on file      Medication Review  Current Outpatient Medications   Medication Instructions    acetaminophen (Tylenol) 325 mg tablet Every 6 hours PRN    anastrozole (ARIMIDEX) 1 mg, Daily    blood sugar diagnostic (OneTouch Verio test strips) strip 1 each, Daily    cholecalciferol (VITAMIN D-3) 50 mcg, oral, Daily, 2,000 international a  day    clobetasoL-emollient 0.05 % cream 2 times daily    cyanocobalamin, vitamin B-12, (Vitamin B-12) 1,000 mcg tablet extended release Take by mouth.    " "famotidine (PEPCID) 10 mg, oral, 2 times daily    pravastatin (PRAVACHOL) 10 mg, oral, Daily    semaglutide (RYBELSUS) 14 mg, oral, Daily      Vitals  BP Readings from Last 2 Encounters:   02/05/25 132/80   07/31/24 124/77     BMI Readings from Last 1 Encounters:   02/05/25 37.90 kg/m²      Labs  A1C  Lab Results   Component Value Date    HGBA1C 6.5 02/05/2025    HGBA1C 6.5 07/31/2024    HGBA1C 6.9 (A) 02/01/2024     BMP  Lab Results   Component Value Date    CALCIUM 9.5 08/01/2024     08/01/2024    K 4.0 08/01/2024    CO2 28 08/01/2024     08/01/2024    BUN 10 08/01/2024    CREATININE 0.84 08/01/2024    EGFR 75 08/01/2024     LFTs  Lab Results   Component Value Date    ALT 19 08/01/2024    AST 33 08/01/2024    ALKPHOS 73 08/01/2024    BILITOT 1.1 08/01/2024     FLP  Lab Results   Component Value Date    TRIG 103 08/01/2024    CHOL 129 08/01/2024    LDLF 93 10/27/2021    LDLCALC 67 08/01/2024    HDL 41.6 08/01/2024     Urine Microalbumin  No results found for: \"MICROALBCREA\"  Weight Management  Wt Readings from Last 3 Encounters:   02/05/25 110 kg (242 lb)   07/31/24 112 kg (246 lb)   07/02/24 111 kg (245 lb 12.8 oz)      There is no height or weight on file to calculate BMI.     Assessment/Plan   Problem List Items Addressed This Visit       DM2 (diabetes mellitus, type 2) (Multi)     Patient's goal A1c is < 7%.  Is pt at goal? Yes, 6.5% on 2/5/2025  Patient's SMBGs are at goal.     Rationale for plan: Patient is currently taking Rybelsus 14 mg daily and tolerating medication well with no adverse effects reported. Patient's postprandial blood sugar readings are at goal. Patient eats a very healthy and balanced diet and participates in regular physical activity. Patient reports paying ~$300 per month for Rybelsus. Based on patient's reported financials, patient likely qualifies for  PAP. Will submit application for  PAP and send prescription for Rybelsus to [facility]. Will follow-up with " patient upon determination of  PAP.    Medication Changes:  CONTINUE  Rybelsus 14 mg daily      Future Considerations:  Consider starting a SGLT-2i if needed    Monitoring and Education:  Diabetes Education  Rule of 15: eating ~15 g of carbs when BG less than 80 (half cup juice, 3-4 glucose tabs).  Recognize symptoms of high and low blood sugar.   Eat a realistic healthy diet consisting of fruits, vegetables, fiber, protein food choices on a regular basis and be aware of portion/serving sizes. Reduce carbohydrate consumption and always consume with protein and fat. Avoid foods high in saturated/trans fat, high salt content, and sweets and beverages with added sugars.  Limit alcohol consumption; alcohol may affect your blood sugar and cause hypoglycemia.   Stay active and incorporate ~30 mins of exercise into your daily routine to manage your weight and increase the body's acceptance of insulin.    PATIENT GOALS   Fasting B - 130 mg/dL 2-HR Postprandial BG:   Less than 180 mg/dL A1c:   Less than 7.0 %     Rybelsus Education  Counseled patient on Rybelsus MOA, expectations, side effects, duration of therapy, administration, and monitoring parameters.   Administer on an empty stomach, at least 30 minutes before the first food, beverage, or other oral medications of the day with =4 oz of plain water only. The  recommends eating 30 to 60 minutes after the dose.  If there is a missed dose, then this dose should be skipped; resume at the next scheduled dose.  Counseled patient to avoid foods that are fatty/oily as this may precipitate the nausea/GI upset that may occur with new start Rybelsus.  Reviewed Rybelsus titration schedule, starting with 3mg once daily for 30 days, then increase to 7 mg once daily; may increase to 14 mg once daily after 30 days on the 7 mg dose if needed to achieve glycemic goals.  The lower initial dose (3 mg daily) is intended to reduce GI symptoms; it does not provide  effective glycemic control  Counseled patient on the benefits of GLP-1ra, such as cardiovascular risk reduction, glycemic control, and weight loss potential.  Advised patient that they may experience improved satiety after meals and portion sizes of meals may be reduced as doses of Rybelsus increase         Relevant Medications    semaglutide (Rybelsus) 14 mg tablet tablet       Clinical Pharmacist follow-up: upon determination of  PAP, Telehealth visit    Continue all meds under the continuation of care with the referring provider and clinical pharmacy team.    Thank you,  Sofia March, PharmD  Clinical Pharmacist  [phone]      Verbal consent to manage patient's drug therapy was obtained from the patient. They were informed they may decline to participate or withdraw from participation in pharmacy services at any time.

## 2025-02-28 ENCOUNTER — TELEPHONE (OUTPATIENT)
Dept: PHARMACY | Facility: HOSPITAL | Age: 70
End: 2025-02-28
Payer: MEDICARE

## 2025-02-28 DIAGNOSIS — E11.69 TYPE 2 DIABETES MELLITUS WITH OTHER SPECIFIED COMPLICATION, WITHOUT LONG-TERM CURRENT USE OF INSULIN: Primary | ICD-10-CM

## 2025-02-28 PROCEDURE — RXMED WILLOW AMBULATORY MEDICATION CHARGE

## 2025-02-28 NOTE — TELEPHONE ENCOUNTER
Called patient to inform that application for  PAP for Rybelsus had been approved. Patient will receive a call from [facility] to schedule a delivery of medication. Will follow-up in 1 month to ensure blood sugars remain controlled and there are no further issues with cost.      Patient Assistance Program Application Status     Cortney Crawley is a 69 y.o. female who was referred to the Clinical Pharmacy Team by Mario Kamara MD     We are pleased to inform you that your application for assistance has been approved.    This approval is valid through February 28, 2026  as long as the following criteria continue to be satisfied:     Your medication (Rybelsus) remains covered under your current insurance plan.   Your prescriber does not discontinue therapy.   You do not seek reimbursement from any other private or government-funded programs for the  medication.    Under this program, the pharmacy will first bill your insurance plan for your specified medication. The [employer] Fund will then offset your copay balance, so that your out-of pocket expense for your medication will be $0.00.     Medication will be filled through [facility], and mailed to the patient. Contacted on the status of the approval. Provided the [facility] phone number, and made aware that they will be hearing from someone at [facility] to set up delivery for the prescriptions.     Please reach out to the Clinical Pharmacy Team if there are any further questions.     Follow up with Clinical Pharmacy Team 3/28/2025 @ 2:00pm    Continue all meds under the continuation of care with the referring provider and clinical pharmacy team.    Verbal consent to manage patient's drug therapy was obtained from patient. They were informed they may decline to participate or withdraw from participation in pharmacy services at any time.

## 2025-03-03 ENCOUNTER — PHARMACY VISIT (OUTPATIENT)
Dept: PHARMACY | Facility: CLINIC | Age: 70
End: 2025-03-03
Payer: COMMERCIAL

## 2025-03-05 NOTE — TELEPHONE ENCOUNTER
"Subjective:       Patient ID: Zhao Gardner is a 58 y.o. male.    Chief Complaint: Cough      Cough  This is a new problem. The current episode started in the past 7 days. The problem has been waxing and waning. The problem occurs every few minutes. The cough is Non-productive. Associated symptoms include headaches, hemoptysis, nasal congestion, postnasal drip, rhinorrhea and a sore throat. Pertinent negatives include no chest pain, chills, ear congestion, ear pain, fever, heartburn, myalgias, rash, shortness of breath, sweats, weight loss or wheezing. Nothing aggravates the symptoms. He has tried OTC cough suppressant, leukotriene antagonists and rest for the symptoms. The treatment provided no relief. There is no history of asthma, bronchiectasis, bronchitis, COPD, emphysema, environmental allergies or pneumonia.       Review of Systems   Constitutional:  Negative for chills, fever and weight loss.   HENT:  Positive for postnasal drip, rhinorrhea and sore throat. Negative for ear pain.    Respiratory:  Positive for cough and hemoptysis. Negative for shortness of breath and wheezing.    Cardiovascular:  Negative for chest pain.   Gastrointestinal:  Negative for heartburn.   Musculoskeletal:  Negative for myalgias.   Skin:  Negative for rash.   Allergic/Immunologic: Negative for environmental allergies.   Neurological:  Positive for headaches.         Objective:     Vitals:    03/05/25 0901 03/05/25 0924   BP: (!) 156/76 (!) 156/78   Pulse: 75 63   Resp: (!) 22    Temp: 96.2 °F (35.7 °C)    TempSrc: Tympanic    SpO2: 95%    Weight: 108.9 kg (240 lb 1.3 oz)    Height: 5' 4" (1.626 m)        Physical Exam  Vitals and nursing note reviewed.   Constitutional:       General: He is not in acute distress.     Appearance: He is well-developed. He is obese.   HENT:      Head: Normocephalic and atraumatic.      Right Ear: Tympanic membrane, ear canal and external ear normal.      Left Ear: Tympanic membrane, ear canal and " I spoke with the patient about her concerns. The patient does not have any swelling of the RUE or hand. The patient is aware that there are no lifting restrictions or mobility restrictions of the Right arm or hand. The patient does not have any swelling, so she is aware that she does not have to do any lymphatic massage of th RUE. The patient is aware to call the office if she were to have any concerns of swelling in the right breast/arm or hand. The patient has no further questions or concerns at this time. external ear normal.      Nose: No mucosal edema.      Comments: Erythematous nasal mucosa noted     Mouth/Throat:      Lips: Pink.      Mouth: Mucous membranes are moist.      Pharynx: No posterior oropharyngeal erythema.      Comments: Clear PND noted to posterior pharynx    Eyes:      General: Lids are normal. No scleral icterus.     Extraocular Movements: Extraocular movements intact.      Conjunctiva/sclera: Conjunctivae normal.   Cardiovascular:      Rate and Rhythm: Normal rate and regular rhythm.   Pulmonary:      Effort: Pulmonary effort is normal.      Breath sounds: Normal breath sounds. No decreased breath sounds, wheezing, rhonchi or rales.   Neurological:      Mental Status: He is alert.      Cranial Nerves: No cranial nerve deficit.   Psychiatric:         Mood and Affect: Mood and affect normal.           Assessment:     1. Acute bacterial sinusitis    2. Urinary symptom or sign    3. Hypertension associated with diabetes    4. Moderate episode of recurrent major depressive disorder          Plan:   1. Acute bacterial sinusitis  -     benzonatate (TESSALON) 200 MG capsule; Take 1 capsule (200 mg total) by mouth 3 (three) times daily as needed for Cough.  Dispense: 30 capsule; Refill: 0  -     methylPREDNISolone acetate injection 40 mg  -     azithromycin (Z-JORDEN) 250 MG tablet; Take 2 tablets by mouth on day 1; Take 1 tablet by mouth on days 2-5  Dispense: 6 tablet; Refill: 0  -     montelukast (SINGULAIR) 10 mg tablet; Take 1 tablet (10 mg total) by mouth every evening.  Dispense: 30 tablet; Refill: 0    2. Urinary symptom or sign  Comments:  Urgency  Orders:  -     POCT urine dipstick without microscope    3. Hypertension associated with diabetes  Overview:  DMII - diet-controlled  Heart failure with preserved ejection fraction-echo 2020 at [facility], normal EF, grade 1 diastolic dysfunction.    Assessment & Plan:  BP elevated, did not take medications over last 2 days. Advised to restart today. He  verbalized understanding. RTC 2 weeks for BP Recheck      4. Moderate episode of recurrent major depressive disorder  Overview:  Stable on Zoloft    Orders:  -     sertraline (ZOLOFT) 100 MG tablet; Take 1 tablet (100 mg total) by mouth once daily.  Dispense: 90 tablet; Refill: 3          Future Appointments   Date Time Provider Department Center   6/30/2025  8:20 AM LABORATORY, [facility]   7/8/2025 10:20 AM Rosalia Mg MD [facility]     Visit today included increased complexity associated with the care of the episodic problem HTN, which was addressed while instituting co-management of the longitudinal care of the patient due to the serious and/or complex managed problem(s) .    I have evaluated and discussed management associated with medical care services that serve as the continuing focal point for all needed health care services and/or with medical care services that are part of ongoing care related to my patient's single, serious condition or a complex condition(s).    I am providing ongoing care and I am the primary care provider for this patient, and they are being managed, monitored, and/or observed for their chronic conditions over time.     I have addressed their ongoing health maintenance requirements and needs for all health care services and reviewed co-management plans provided by specialty providers when available.    Health Maintenance Due   Topic Date Due    Pneumococcal Vaccines (Age 50+) (1 of 2 - PCV) Never done    Shingles Vaccine (2 of 2) 06/17/2021    Influenza Vaccine (1) 09/01/2024    COVID-19 Vaccine (3 - 2024-25 season) 09/01/2024    Diabetes Urine Screening  12/21/2024    Colorectal Cancer Screening  06/03/2025

## 2025-03-27 ENCOUNTER — TELEPHONE (OUTPATIENT)
Dept: PRIMARY CARE | Facility: CLINIC | Age: 70
End: 2025-03-27
Payer: MEDICARE

## 2025-03-27 NOTE — TELEPHONE ENCOUNTER
"Pt is concerned about the measles going around.  She advsed she had the \"Thai\" measles as a youngster and doesn't know if she has been vaccinated.      Her question is should she get vaccinated?  "

## 2025-03-28 ENCOUNTER — APPOINTMENT (OUTPATIENT)
Dept: PHARMACY | Facility: HOSPITAL | Age: 70
End: 2025-03-28
Payer: MEDICARE

## 2025-03-28 DIAGNOSIS — E11.69 TYPE 2 DIABETES MELLITUS WITH OTHER SPECIFIED COMPLICATION, WITHOUT LONG-TERM CURRENT USE OF INSULIN: ICD-10-CM

## 2025-03-28 NOTE — ASSESSMENT & PLAN NOTE
Patient's goal A1c is < 7%.  Is pt at goal? Yes, 6.5% on 2025  Patient's SMBGs are at goal.     Rationale for plan: Patient is currently taking Rybelsus 14 mg daily and tolerating medication well with no adverse effects reported. Patient's postprandial blood sugar readings are at goal. Patient is continuing to eat a very healthy and balanced diet and participates in regular physical activity. Patient has begun receiving Rybelsus at no cost from [facility] through  PAP. As patient has been on Rybelsus for a long time and is stable on therapy, will not make any changes to medication regimen. Per patient preference, will follow-up in 6 months.     Medication Changes:  CONTINUE  Rybelsus 14 mg daily    Future Considerations:  Consider starting a SGLT-2i if needed    Monitoring and Education:  Diabetes Education  Rule of 15: eating ~15 g of carbs when BG less than 80 (half cup juice, 3-4 glucose tabs).  Recognize symptoms of high and low blood sugar.   Eat a realistic healthy diet consisting of fruits, vegetables, fiber, protein food choices on a regular basis and be aware of portion/serving sizes. Reduce carbohydrate consumption and always consume with protein and fat. Avoid foods high in saturated/trans fat, high salt content, and sweets and beverages with added sugars.  Limit alcohol consumption; alcohol may affect your blood sugar and cause hypoglycemia.   Stay active and incorporate ~30 mins of exercise into your daily routine to manage your weight and increase the body's acceptance of insulin.    PATIENT GOALS   Fasting B - 130 mg/dL 2-HR Postprandial BG:   Less than 180 mg/dL A1c:   Less than 7.0 %     Rybelsus Education  Counseled patient on Rybelsus MOA, expectations, side effects, duration of therapy, administration, and monitoring parameters.   Administer on an empty stomach, at least 30 minutes before the first food, beverage, or other oral medications of the day with =4 oz of plain  water only. The  recommends eating 30 to 60 minutes after the dose.  If there is a missed dose, then this dose should be skipped; resume at the next scheduled dose.  Counseled patient to avoid foods that are fatty/oily as this may precipitate the nausea/GI upset that may occur with new start Rybelsus.  Reviewed Rybelsus titration schedule, starting with 3mg once daily for 30 days, then increase to 7 mg once daily; may increase to 14 mg once daily after 30 days on the 7 mg dose if needed to achieve glycemic goals.  The lower initial dose (3 mg daily) is intended to reduce GI symptoms; it does not provide effective glycemic control  Counseled patient on the benefits of GLP-1ra, such as cardiovascular risk reduction, glycemic control, and weight loss potential.  Advised patient that they may experience improved satiety after meals and portion sizes of meals may be reduced as doses of Rybelsus increase

## 2025-03-28 NOTE — PROGRESS NOTES
Clinical Pharmacy Appointment    Patient ID: Cortney Crawley is a 69 y.o. female who presents for Diabetes.    Pt is here for Follow Up appointment.     Referring Provider: Mario Kamara, *  PCP: Mario Kamara MD   Last visit with PCP: 2/5/2025   Next visit with PCP: 7/31/2025     Patient Assistance for Rybelsus approved through 2/28/2026. Will have to be renewed prior to that date to prevent lapse in coverage. Medication(s) will be received at no cost to patient from [facility].      Subjective     Interval History  Reports paying ~$300/month for Rybelsus- now approved for  PAP    HPI  DIABETES MELLITUS TYPE 2:    Diagnosed with diabetes: ~10 years ago . Known diabetic complications: HLD, obesity, retinopathy.  Does patient follow with Endocrinology: No  Last optometry exam: 9/9/2024  Most recent visit in Podiatry: none-- patient denies sores or cuts on feet today      Current diabetic medications include:  Rybelsus 14 mg daily    Clarifications to above regimen: none   Adverse Effects: none     Past diabetic medications include:  Metformin- caused lots of itching all over body   Januvia- does not remember taking    Glucose Readings:  Glucometer/CGM Type: OneTouch Verio  Patient tests BG 1 time per day  - checks 2 hours after breakfast      Current home BG readings (mg/dL): 145, 168, 150, 154, 143, 126, 156, 143, 164, 166, 166    Previous home BG readings (mg/dL): 138, 176, 164, 155, 139, 137, 139, 121, 146, 137    Any episodes of hypoglycemia? No,   .  Did patient treat episode of hypoglycemia appropriately? N/A  Does the patient have a prescription for ready-to-use Glucagon? Not on insulin    Lifestyle:  Diet: 3 meals/day.   BK: veggie burger + slice of cheese  LN: rice + beans + greens  DN: cup of oatmeal   Snacks: rice cake   Drinks: water only   Physical Activity: has a treadmill at home and goes for walks in the grocery stores   Tobacco history: none  Alcohol use:  "none    Secondary Prevention:  Statin? Yes  ACE-I/ARB? No  Aspirin? No    Pertinent PMH Review:  PMH of Pancreatitis: No  PMH of Retinopathy: Yes  PMH of Urinary Tract Infections: No- only when younger  PMH of MTC: No  UACR/EGFR in last year?: Yes  No results found for: \"MICROALBCREA\"    Immunizations:  Influenza? No  COVID? No  Pneumonia? Yes  Shingles? Yes    Medication Reconciliation:  Changed: N/A   Added: N/A  Discontinued: N/A    Drug Interactions  No relevant drug interactions were noted.    Medication System Management  Patient's preferred pharmacy: [facility]  Adherence/Organization: not assessed this visit  Affordability/Accessibility: reports high cost of Rybelsus-  PAP approved      Objective   Allergies   Allergen Reactions    Corticosteroids (Glucocorticoids) Other    Metformin Other    Miconazole Nitrate-Zinc Ox-Pet Unknown    Penicillins Other    Sitagliptin Unknown     Social History     Social History Narrative    Not on file      Medication Review  Current Outpatient Medications   Medication Instructions    acetaminophen (Tylenol) 325 mg tablet Every 6 hours PRN    anastrozole (ARIMIDEX) 1 mg, Daily    blood sugar diagnostic (OneTouch Verio test strips) strip 1 each, Daily    cholecalciferol (VITAMIN D-3) 50 mcg, oral, Daily, 2,000 international a  day    clobetasoL-emollient 0.05 % cream 2 times daily    cyanocobalamin, vitamin B-12, (Vitamin B-12) 1,000 mcg tablet extended release Take by mouth.    famotidine (PEPCID) 10 mg, oral, 2 times daily    pravastatin (PRAVACHOL) 10 mg, oral, Daily    Rybelsus 14 mg, oral, Daily      Vitals  BP Readings from Last 2 Encounters:   02/05/25 132/80   07/31/24 124/77     BMI Readings from Last 1 Encounters:   02/05/25 37.90 kg/m²      Labs  A1C  Lab Results   Component Value Date    HGBA1C 6.5 02/05/2025    HGBA1C 6.5 07/31/2024    HGBA1C 6.9 (A) 02/01/2024     BMP  Lab Results   Component Value Date    CALCIUM 9.5 08/01/2024     08/01/2024 " "   K 4.0 08/01/2024    CO2 28 08/01/2024     08/01/2024    BUN 10 08/01/2024    CREATININE 0.84 08/01/2024    EGFR 75 08/01/2024     LFTs  Lab Results   Component Value Date    ALT 19 08/01/2024    AST 33 08/01/2024    ALKPHOS 73 08/01/2024    BILITOT 1.1 08/01/2024     FLP  Lab Results   Component Value Date    TRIG 103 08/01/2024    CHOL 129 08/01/2024    LDLF 93 10/27/2021    LDLCALC 67 08/01/2024    HDL 41.6 08/01/2024     Urine Microalbumin  No results found for: \"MICROALBCREA\"  Weight Management  Wt Readings from Last 3 Encounters:   02/05/25 110 kg (242 lb)   07/31/24 112 kg (246 lb)   07/02/24 111 kg (245 lb 12.8 oz)      There is no height or weight on file to calculate BMI.     Assessment/Plan   Problem List Items Addressed This Visit       DM2 (diabetes mellitus, type 2) (Multi)     Patient's goal A1c is < 7%.  Is pt at goal? Yes, 6.5% on 2/5/2025  Patient's SMBGs are at goal.     Rationale for plan: Patient is currently taking Rybelsus 14 mg daily and tolerating medication well with no adverse effects reported. Patient's postprandial blood sugar readings are at goal. Patient is continuing to eat a very healthy and balanced diet and participates in regular physical activity. Patient has begun receiving Rybelsus at no cost from [facility] through  PAP. As patient has been on Rybelsus for a long time and is stable on therapy, will not make any changes to medication regimen. Per patient preference, will follow-up in 6 months.     Medication Changes:  CONTINUE  Rybelsus 14 mg daily    Future Considerations:  Consider starting a SGLT-2i if needed    Monitoring and Education:  Diabetes Education  Rule of 15: eating ~15 g of carbs when BG less than 80 (half cup juice, 3-4 glucose tabs).  Recognize symptoms of high and low blood sugar.   Eat a realistic healthy diet consisting of fruits, vegetables, fiber, protein food choices on a regular basis and be aware of portion/serving sizes. Reduce " carbohydrate consumption and always consume with protein and fat. Avoid foods high in saturated/trans fat, high salt content, and sweets and beverages with added sugars.  Limit alcohol consumption; alcohol may affect your blood sugar and cause hypoglycemia.   Stay active and incorporate ~30 mins of exercise into your daily routine to manage your weight and increase the body's acceptance of insulin.    PATIENT GOALS   Fasting B - 130 mg/dL 2-HR Postprandial BG:   Less than 180 mg/dL A1c:   Less than 7.0 %     Rybelsus Education  Counseled patient on Rybelsus MOA, expectations, side effects, duration of therapy, administration, and monitoring parameters.   Administer on an empty stomach, at least 30 minutes before the first food, beverage, or other oral medications of the day with =4 oz of plain water only. The  recommends eating 30 to 60 minutes after the dose.  If there is a missed dose, then this dose should be skipped; resume at the next scheduled dose.  Counseled patient to avoid foods that are fatty/oily as this may precipitate the nausea/GI upset that may occur with new start Rybelsus.  Reviewed Rybelsus titration schedule, starting with 3mg once daily for 30 days, then increase to 7 mg once daily; may increase to 14 mg once daily after 30 days on the 7 mg dose if needed to achieve glycemic goals.  The lower initial dose (3 mg daily) is intended to reduce GI symptoms; it does not provide effective glycemic control  Counseled patient on the benefits of GLP-1ra, such as cardiovascular risk reduction, glycemic control, and weight loss potential.  Advised patient that they may experience improved satiety after meals and portion sizes of meals may be reduced as doses of Rybelsus increase         Relevant Orders    Referral to Clinical Pharmacy     Clinical Pharmacist follow-up: 2025 @ 2:00 PM, Telehealth visit    Continue all meds under the continuation of care with the referring provider  and clinical pharmacy team.    Thank you,  Sofia March, PharmD  Clinical Pharmacist  [phone]    Verbal consent to manage patient's drug therapy was obtained from the patient. They were informed they may decline to participate or withdraw from participation in pharmacy services at any time.

## 2025-04-03 ENCOUNTER — HOSPITAL ENCOUNTER (EMERGENCY)
Facility: HOSPITAL | Age: 70
Discharge: HOME | End: 2025-04-04
Attending: STUDENT IN AN ORGANIZED HEALTH CARE EDUCATION/TRAINING PROGRAM
Payer: MEDICARE

## 2025-04-03 ENCOUNTER — APPOINTMENT (OUTPATIENT)
Dept: RADIOLOGY | Facility: HOSPITAL | Age: 70
End: 2025-04-03
Payer: MEDICARE

## 2025-04-03 DIAGNOSIS — R03.0 ELEVATED BLOOD PRESSURE READING: ICD-10-CM

## 2025-04-03 DIAGNOSIS — S42.252A CLOSED DISPLACED FRACTURE OF GREATER TUBEROSITY OF LEFT HUMERUS, INITIAL ENCOUNTER: ICD-10-CM

## 2025-04-03 DIAGNOSIS — S43.005A DISLOCATION OF LEFT SHOULDER JOINT, INITIAL ENCOUNTER: Primary | ICD-10-CM

## 2025-04-03 PROCEDURE — 73030 X-RAY EXAM OF SHOULDER: CPT | Mod: LT

## 2025-04-03 PROCEDURE — 73200 CT UPPER EXTREMITY W/O DYE: CPT | Mod: LT

## 2025-04-03 PROCEDURE — 73030 X-RAY EXAM OF SHOULDER: CPT | Mod: LEFT SIDE | Performed by: STUDENT IN AN ORGANIZED HEALTH CARE EDUCATION/TRAINING PROGRAM

## 2025-04-03 PROCEDURE — 96374 THER/PROPH/DIAG INJ IV PUSH: CPT

## 2025-04-03 PROCEDURE — 73030 X-RAY EXAM OF SHOULDER: CPT | Mod: LEFT SIDE | Performed by: RADIOLOGY

## 2025-04-03 PROCEDURE — 73060 X-RAY EXAM OF HUMERUS: CPT | Mod: LT

## 2025-04-03 PROCEDURE — 23665 CLTX SHO DSLC FX GR HMRL TBR: CPT | Performed by: PHYSICIAN ASSISTANT

## 2025-04-03 PROCEDURE — 2500000004 HC RX 250 GENERAL PHARMACY W/ HCPCS (ALT 636 FOR OP/ED): Performed by: STUDENT IN AN ORGANIZED HEALTH CARE EDUCATION/TRAINING PROGRAM

## 2025-04-03 PROCEDURE — 96361 HYDRATE IV INFUSION ADD-ON: CPT | Mod: 59

## 2025-04-03 PROCEDURE — 2500000005 HC RX 250 GENERAL PHARMACY W/O HCPCS: Performed by: STUDENT IN AN ORGANIZED HEALTH CARE EDUCATION/TRAINING PROGRAM

## 2025-04-03 PROCEDURE — 99285 EMERGENCY DEPT VISIT HI MDM: CPT | Performed by: PHYSICIAN ASSISTANT

## 2025-04-03 PROCEDURE — 99285 EMERGENCY DEPT VISIT HI MDM: CPT | Performed by: STUDENT IN AN ORGANIZED HEALTH CARE EDUCATION/TRAINING PROGRAM

## 2025-04-03 PROCEDURE — 73060 X-RAY EXAM OF HUMERUS: CPT | Mod: LEFT SIDE | Performed by: STUDENT IN AN ORGANIZED HEALTH CARE EDUCATION/TRAINING PROGRAM

## 2025-04-03 PROCEDURE — 96360 HYDRATION IV INFUSION INIT: CPT | Mod: 59

## 2025-04-03 RX ORDER — PROPOFOL 10 MG/ML
100 INJECTION, EMULSION INTRAVENOUS ONCE
Status: COMPLETED | OUTPATIENT
Start: 2025-04-03 | End: 2025-04-03

## 2025-04-03 RX ORDER — OXYCODONE HYDROCHLORIDE 5 MG/1
5 TABLET ORAL ONCE
Status: DISCONTINUED | OUTPATIENT
Start: 2025-04-03 | End: 2025-04-04 | Stop reason: HOSPADM

## 2025-04-03 RX ORDER — PROPOFOL 10 MG/ML
0.5 INJECTION, EMULSION INTRAVENOUS AS NEEDED
Status: DISCONTINUED | OUTPATIENT
Start: 2025-04-03 | End: 2025-04-04 | Stop reason: HOSPADM

## 2025-04-03 RX ORDER — ACETAMINOPHEN 325 MG/1
650 TABLET ORAL ONCE
Status: DISCONTINUED | OUTPATIENT
Start: 2025-04-03 | End: 2025-04-04 | Stop reason: HOSPADM

## 2025-04-03 RX ADMIN — SODIUM CHLORIDE, SODIUM LACTATE, POTASSIUM CHLORIDE, AND CALCIUM CHLORIDE 500 ML: .6; .31; .03; .02 INJECTION, SOLUTION INTRAVENOUS at 21:05

## 2025-04-03 RX ADMIN — PROPOFOL 100 MG: 10 INJECTION, EMULSION INTRAVENOUS at 21:14

## 2025-04-03 RX ADMIN — Medication 2 L/MIN: at 21:05

## 2025-04-03 ASSESSMENT — COLUMBIA-SUICIDE SEVERITY RATING SCALE - C-SSRS
6. HAVE YOU EVER DONE ANYTHING, STARTED TO DO ANYTHING, OR PREPARED TO DO ANYTHING TO END YOUR LIFE?: NO
2. HAVE YOU ACTUALLY HAD ANY THOUGHTS OF KILLING YOURSELF?: NO
1. IN THE PAST MONTH, HAVE YOU WISHED YOU WERE DEAD OR WISHED YOU COULD GO TO SLEEP AND NOT WAKE UP?: NO

## 2025-04-03 ASSESSMENT — PAIN DESCRIPTION - ORIENTATION: ORIENTATION: UPPER

## 2025-04-03 ASSESSMENT — PAIN SCALES - GENERAL: PAINLEVEL_OUTOF10: 10 - WORST POSSIBLE PAIN

## 2025-04-03 ASSESSMENT — PAIN - FUNCTIONAL ASSESSMENT: PAIN_FUNCTIONAL_ASSESSMENT: 0-10

## 2025-04-03 ASSESSMENT — LIFESTYLE VARIABLES
HAVE YOU EVER FELT YOU SHOULD CUT DOWN ON YOUR DRINKING: NO
EVER HAD A DRINK FIRST THING IN THE MORNING TO STEADY YOUR NERVES TO GET RID OF A HANGOVER: NO
TOTAL SCORE: 0
EVER FELT BAD OR GUILTY ABOUT YOUR DRINKING: NO
HAVE PEOPLE ANNOYED YOU BY CRITICIZING YOUR DRINKING: NO

## 2025-04-03 ASSESSMENT — PAIN DESCRIPTION - LOCATION: LOCATION: ARM

## 2025-04-03 NOTE — ED PROVIDER NOTES
Emergency Department Provider Note        History of Present Illness     History provided by: Patient  Limitations to History: None  External Records Reviewed with Brief Summary:  pmhx    HPI:  Cortney Crawley is a 69 y.o. female who presents today for evaluation of left arm injury, patient states that around 3 or 4:00 today she tripped over some she was on the ground, states that she started to fall forward and caught herself with her left forearm, patient states that she felt a crack in her left shoulder.  Since then has not been able to move it.  She did not hit her head or lose consciousness, denies headache neck pain or pain anywhere else.  All of her pain is in her left shoulder.  She denies any numbness or tingling.  Patient self applied a splint, states she took Tylenol before arrival.  Patient states she cannot take NSAIDs and she also would like to avoid any narcotics, states that she is in pain but she would like us to defer any medications, she just wants to get x-rays.    Patient is noted to be markedly hypertensive at 201/105, patient states that they had to do a wrist blood pressure because she has lymphedema on the right side from her previous breast cancer and her left arm is the one that hurts.  Patient denies any headache blurred vision chest pain shortness of breath, abdominal pain or back pain.  Patient states she has no prior history of hypertension, states her systolic is normally 130, she believes it is secondary to pain, she does not take any medication for blood pressure.    Physical Exam   Triage vitals:  T 36.7 °C (98.1 °F)  HR 97  BP (!) 201/105  RR 18  O2 99 % None (Room air)    Physical Exam  Vitals and nursing note reviewed.   Constitutional:       General: She is not in acute distress.     Appearance: Normal appearance. She is not toxic-appearing.   HENT:      Head: Normocephalic and atraumatic.      Nose: Nose normal.   Eyes:      Extraocular Movements: Extraocular movements  intact.   Cardiovascular:      Rate and Rhythm: Normal rate and regular rhythm.   Pulmonary:      Effort: Pulmonary effort is normal.   Abdominal:      Palpations: Abdomen is soft.   Musculoskeletal:         General: Normal range of motion.      Cervical back: Normal range of motion and neck supple.      Comments: No midline tenderness to cervical thoracic or lumbar spine, no chest wall, abdominal or hip tenderness, no upper or lower extremity tenderness aside from what is explicitly mentioned.    Patient is tender to the left shoulder with obvious deformity noted, normal sensation over axillary median and ulnar nerve, no wrist drop, 2+ radial pulse, distal sensation is intact.  Patient is unable to flex and AB duct beyond just a few degrees without pain.  Passive range of motion limited.   Skin:     General: Skin is warm and dry.   Neurological:      General: No focal deficit present.      Mental Status: She is alert.   Psychiatric:         Mood and Affect: Mood normal.         Thought Content: Thought content normal.       XR shoulder left 2+ views    (Results Pending)   XR humerus left    (Results Pending)     Labs Reviewed - No data to display  ED Course as of 25   Thu    Spoke with Dr Merrill, with orthopedics, given fracture dislocation, he recommended reduction with postop imaging and a CT of the shoulder once reduced, recommended sling outpatient and orthopedic follow-up. [MC]      ED Course User Index  [MC] Cecilia Michael PA-C          Medical Decision Making & ED Course   Medical Decision Makin y.o. female presents today for evaluation of left shoulder injury, she has no other areas of tenderness on exam, she did catch herself with her forearm but no wrist hand forearm or elbow tenderness, her tenderness is all at the shoulder, no clavicular or rib tenderness.  I suspect a possible dislocation versus fracture.  X-rays were obtained of the shoulder and humerus.  Patient  declined any pain medication.  Her blood pressure is markedly elevated however she is asymptomatic, suspect it is secondary to pain.  Were also not able to get an optimal blood pressure reading because we have to check at the level of the wrist because of her right arm lymphedema and pain at the left arm.      X-ray did show fracture and dislocation, I discussed case with orthopedic doctors, see ED course, he recommended reduction, patient was consented for sedation and reduction, was given propofol administered by Dr. Whitlock, myself and Dr. Whitlock together attempted reduction, postreduction films were obtained.  At the time of this note, postreduction film and CT is pending, patient will be signed out to incoming resident pending final results.  ----      Differential diagnoses considered include but are not limited to: Fracture, dislocation, contusion, sprain, strain, rotator cuff tear, etc.     Social Determinants of Health which Significantly Impact Care: None identified     EKG Independent Interpretation: EKG not obtained    Independent Result Review and Interpretation: Relevant laboratory and radiographic results were reviewed and independently interpreted by myself.  As necessary, they are commented on in the ED Course.    Chronic conditions affecting the patient's care: As documented above in [facility]    The patient was discussed with the following consultants/services: Dr. Merrill, ortho doc    Care Considerations: As documented above in [facility]    ED Course:  ED Course as of 04/03/25 2214 Thu Apr 03, 2025 2033   Spoke with Dr Merrill, with orthopedics, given fracture dislocation, he recommended reduction with postop imaging and a CT of the shoulder once reduced, recommended sling outpatient and orthopedic follow-up. [MC]      ED Course User Index  [MC] Cecilia Michael PA-C     Disposition   Patient was signed out to Ernie Viramontes at 10pm pending completion of their work-up.  Please see the next provider's transition  of care note for the remainder of the patient's care.     Procedures   Procedures    This was a shared visit with an ED attending.  The patient was seen and discussed with the ED attending    Cecilia Michael PA-C  Emergency Medicine       Cecilia Michael PA-C  04/03/25 [id number]

## 2025-04-04 ENCOUNTER — TELEPHONE (OUTPATIENT)
Age: 70
End: 2025-04-04

## 2025-04-04 VITALS
HEART RATE: 82 BPM | DIASTOLIC BLOOD PRESSURE: 102 MMHG | OXYGEN SATURATION: 99 % | SYSTOLIC BLOOD PRESSURE: 222 MMHG | RESPIRATION RATE: 16 BRPM | WEIGHT: 245 LBS | HEIGHT: 66 IN | BODY MASS INDEX: 39.37 KG/M2 | TEMPERATURE: 98.1 F

## 2025-04-04 PROCEDURE — 73200 CT UPPER EXTREMITY W/O DYE: CPT | Mod: LEFT SIDE | Performed by: SURGERY

## 2025-04-04 RX ORDER — ONDANSETRON 4 MG/1
4 TABLET, ORALLY DISINTEGRATING ORAL EVERY 8 HOURS PRN
Qty: 10 TABLET | Refills: 0 | Status: SHIPPED | OUTPATIENT
Start: 2025-04-04 | End: 2025-04-11

## 2025-04-04 RX ORDER — OXYCODONE HYDROCHLORIDE 5 MG/1
5 TABLET ORAL EVERY 6 HOURS PRN
Qty: 15 TABLET | Refills: 0 | Status: SHIPPED | OUTPATIENT
Start: 2025-04-04 | End: 2025-04-11

## 2025-04-04 NOTE — TELEPHONE ENCOUNTER
The patient has a prescribed lidocaine patch for her left shoulder.The patient was informed that she would be ok to use the patch as prescribed. The patch is topical , noninvasive and on the contralateral side of where she had LN removed.The patient verbalized understanding and has no further questions at this time.

## 2025-04-04 NOTE — PROCEDURES
Procedure  Orthopaedic Injury Treatment - Upper Extremity    Performed by: Cecilia Michael PA-C  Authorized by: Irving Whitlock DO    Consent:     Consent obtained:  Verbal and written    Consent given by:  Patient    Risks, benefits, and alternatives were discussed: yes      Risks discussed:  Fracture, irreducible dislocation, recurrent dislocation, nerve damage, restricted joint movement, stiffness and vascular damage    Alternatives discussed:  No treatment  Universal protocol:     Procedure explained and questions answered to patient or proxy's satisfaction: yes      Imaging studies available: yes      Required blood products, implants, devices, and special equipment available: yes      Immediately prior to procedure, a time out was called: yes      Patient identity confirmed:  Verbally with patient and arm band  Location:     Location:  Shoulder    Shoulder location:  L shoulder    Shoulder dislocation type: anterior      Chronicity:  New  Pre-procedure details:     Pre-procedure imaging:  X-ray    Imaging findings: dislocation present      Fracture of greater humeral tuberosity: yes      Hill-Sachs deformity: no      Distal perfusion: normal    Sedation:     Sedation type:  Moderate sedation  Procedure details:     Manipulation performed: yes      Shoulder reduction method:  Direct traction, external rotation and traction and counter traction    Reduction successful: yes      Reduction confirmed with imaging: yes      Immobilization:  Sling    Supplies used:  Sling  Post-procedure details:     Neurological function: normal      Distal perfusion: normal      Range of motion: improved      Procedure completion:  Tolerated well, no immediate complications

## 2025-04-04 NOTE — ED PROCEDURE NOTE
Procedure  Moderate Sedation    Performed by: Irving Whitlock DO  Authorized by: Irving Whitlock DO    Consent:     Consent obtained:  Written    Consent given by:  Patient    Risks, benefits, and alternatives were discussed: yes      Risks discussed:  Allergic reaction, dysrhythmia, inadequate sedation, nausea, vomiting, prolonged sedation necessitating reversal, prolonged hypoxia resulting in organ damage and respiratory compromise necessitating ventilatory assistance and intubation    Alternatives discussed:  Analgesia without sedation and anxiolysis  Universal protocol:     Protocol observed: The universal protocol was observed before the procedure and is documented in the nursing flowsheets    Indications:     Procedure performed:  Dislocation reduction    Procedure necessitating sedation performed by:  Physician performing sedation    Level of sedation:  Moderate  Pre-sedation assessment:     Mouth opening:  3 or more finger widths    Thyromental distance:  3 finger widths    Mallampati score:  II - soft palate, uvula, fauces visible    Neck mobility: normal      History of difficult intubation: no    Immediate pre-procedure details:     Monitoring: The patient is on appropriate monitoring (Including: 3 or 5 lead EKG, Pulse Oximetry, Capnography, and Blood Pressure monitoring), oxygenation has been addressed, and critical airway and emergency equipment is immediately available before the initiation of sedation      Reassessment: Patient reassessed immediately prior to procedure      Reviewed: vital signs, relevant labs/tests and NPO status    Procedure details (see MAR for exact dosages):    Other intra-procedure events: none  Post-procedure details:     Attendance: Constant attendance by certified staff until patient recovered      Procedure completion:  Tolerated well, no immediate complications  Comments:      140mg propofol given               Irving Whitlock DO  04/03/25 3427

## 2025-04-04 NOTE — PROGRESS NOTES
History: Cortney is here for her left  shoulder. She tripped over a pair of shoes 4 days ago and felt a crack in her shoulder. She then went to push off and felt another crack. She was seen in the ER afterwards where is was determined she had a proximal humerus fracture and glenohumeral dislocation that was reduced. She is wearing her sling today. She is right hand dominant. She is here as a new patient today.     Past medical history: Multiple  Medications: Multiple  Allergies: No known drug allergies    Please refer to the intake H&P regarding the patient's review of systems, family history and social history as was done today    HEENT: Normal  Lungs: Clear to auscultation  Heart: RRR  Abdomen: Soft, nontender  Skin: clear  Extremity: She has moderate ecchymosis throughout the arm.  Elbow wrist and hand are moving well.  She can rotate to 10 degrees with soreness.  Pain with any abduction.  No numbness or tingling.  Contralateral exam is normal for strength, motion, stability and neurovascular assessment.    Radiographs: Recent XR of the left shoulder shows a well aligned proximal humerus fracture.      Assessment: Stable left proximal humerus fracture and dislocation, 4 days out     Plan: She is here as a new patient today. We discussed several options for treatment and I recommended non-operative management at this time. We gave her a new sling today. She will continue to aggressively ice the shoulder. She can use her hand but cannot do any active motion. We discussed that this could potentially cause permanent stiffness. She understands this. We will see her back 2-3 weeks to assess progress with 2 view shoulder x-rays. If doing well we will start some therapy, likely 30/90/90 P ROM for 3 weeks followed by full P ROM.  She understands the long-term risk of stiffness with these injuries. She declines the need for any work restrictions today.   All questions were answered today with the patient.    Faustino  Attestation  By signing my name below, IBeth Scribe   attest that this documentation has been prepared under the direction and in the presence of Ronnie Roque MD.

## 2025-04-04 NOTE — TELEPHONE ENCOUNTER
Pt had Breast Cancer in her Right Breast. Earlier this week she fractured her shoulder. They offered her a lidocain patch but she wanted to check with Dr. Gustafson to see if she is okay with her doing this medication. Please Advise.

## 2025-04-04 NOTE — DISCHARGE INSTRUCTIONS
If any diminished sensation, numbness tingling in the fingertips, discoloration of the fingers, uncontrolled pain or your blood pressure is severely elevated with associated headache, blurred vision, chest pain, shortness of breath etc. return to the emergency room.  Follow-up with orthopedics as an outpatient.    We have given you the phone number and address to the orthopedic walk-in clinic.  They are open Monday through Friday, 8 AM through 5 PM.  Please follow-up as soon as you can or on Monday as scheduled.

## 2025-04-04 NOTE — NURSING NOTE
Called to bedside for procedural sedation for reduction of L shoulder.  Pt placed on capnography and 2 lpm. O2 sat remained above 96% and ETCO2 remained above 26 throughout procedure.

## 2025-04-07 ENCOUNTER — OFFICE VISIT (OUTPATIENT)
Dept: ORTHOPEDIC SURGERY | Facility: CLINIC | Age: 70
End: 2025-04-07
Payer: MEDICARE

## 2025-04-07 DIAGNOSIS — S42.252A CLOSED DISPLACED FRACTURE OF GREATER TUBEROSITY OF LEFT HUMERUS, INITIAL ENCOUNTER: ICD-10-CM

## 2025-04-07 DIAGNOSIS — S43.005A DISLOCATION OF LEFT SHOULDER JOINT, INITIAL ENCOUNTER: ICD-10-CM

## 2025-04-07 PROCEDURE — 1159F MED LIST DOCD IN RCRD: CPT | Performed by: ORTHOPAEDIC SURGERY

## 2025-04-07 PROCEDURE — 1036F TOBACCO NON-USER: CPT | Performed by: ORTHOPAEDIC SURGERY

## 2025-04-07 PROCEDURE — 99213 OFFICE O/P EST LOW 20 MIN: CPT | Mod: 25 | Performed by: ORTHOPAEDIC SURGERY

## 2025-04-07 PROCEDURE — L3670 SO ACRO/CLAV CAN WEB PRE OTS: HCPCS | Performed by: ORTHOPAEDIC SURGERY

## 2025-04-07 PROCEDURE — 23620 CLTX GR HMRL TBRS FX WO MNPJ: CPT | Performed by: ORTHOPAEDIC SURGERY

## 2025-04-07 PROCEDURE — 99203 OFFICE O/P NEW LOW 30 MIN: CPT | Performed by: ORTHOPAEDIC SURGERY

## 2025-04-07 PROCEDURE — 1123F ACP DISCUSS/DSCN MKR DOCD: CPT | Performed by: ORTHOPAEDIC SURGERY

## 2025-04-08 ENCOUNTER — TELEPHONE (OUTPATIENT)
Dept: PRIMARY CARE | Facility: CLINIC | Age: 70
End: 2025-04-08
Payer: MEDICARE

## 2025-04-08 NOTE — TELEPHONE ENCOUNTER
Pt asking if it is ok to use bengay inbetween 12 hrs on, 12 hrs off of Prescription Lidocaine patches?

## 2025-04-23 ENCOUNTER — HOSPITAL ENCOUNTER (OUTPATIENT)
Dept: RADIOLOGY | Facility: HOSPITAL | Age: 70
Discharge: HOME | End: 2025-04-23
Payer: MEDICARE

## 2025-04-23 ENCOUNTER — APPOINTMENT (OUTPATIENT)
Dept: ORTHOPEDIC SURGERY | Facility: CLINIC | Age: 70
End: 2025-04-23
Payer: MEDICARE

## 2025-04-23 DIAGNOSIS — S42.252A CLOSED DISPLACED FRACTURE OF GREATER TUBEROSITY OF LEFT HUMERUS, INITIAL ENCOUNTER: ICD-10-CM

## 2025-04-23 PROCEDURE — 99024 POSTOP FOLLOW-UP VISIT: CPT | Performed by: PHYSICIAN ASSISTANT

## 2025-04-23 PROCEDURE — 73030 X-RAY EXAM OF SHOULDER: CPT | Mod: LEFT SIDE | Performed by: RADIOLOGY

## 2025-04-23 PROCEDURE — 1123F ACP DISCUSS/DSCN MKR DOCD: CPT | Performed by: PHYSICIAN ASSISTANT

## 2025-04-23 PROCEDURE — 73030 X-RAY EXAM OF SHOULDER: CPT | Mod: LT

## 2025-04-23 PROCEDURE — 3044F HG A1C LEVEL LT 7.0%: CPT | Performed by: PHYSICIAN ASSISTANT

## 2025-04-23 NOTE — PROGRESS NOTES
History: Cortney is here for her left shoulder.  She is 3 weeks out from a proximal humerus fracture.  Her pain is improving.  She has been wearing her sling.    Physical Exam: The skin is intact throughout.  Her swelling and ecchymosis are improving.  She is able to make a full fist with the hand.  She has appropriate passive range of motion at the side.  No numbness or tingling.     Radiographs: 2 view left shoulder x-rays show good alignment of the proximal humerus fracture with no change in positioning noted.    Assessment: Stable left proximal humerus fracture, 3 weeks out.    Plan: She is going to continue to wear the sling except for hygiene purposes and therapy exercises.  She was given a therapy order today and can do 30 9090 passive range of motion for 3 weeks followed by full passive range of motion until follow-up.  She can work on active elbow and hand motion.  She is going to continue with an icing regimen.  We will see her back in 3 to 4 weeks to assess progress with 2 view left shoulder x-rays.  If doing well at the time we can begin to wean from the sling and advance her therapy.  All questions were answered with the patient.   This note was generated with voice recognition software and may contain grammatical errors.

## 2025-05-01 ENCOUNTER — APPOINTMENT (OUTPATIENT)
Dept: PHYSICAL THERAPY | Facility: CLINIC | Age: 70
End: 2025-05-01
Payer: MEDICARE

## 2025-05-02 ENCOUNTER — EVALUATION (OUTPATIENT)
Dept: PHYSICAL THERAPY | Facility: CLINIC | Age: 70
End: 2025-05-02
Payer: MEDICARE

## 2025-05-02 DIAGNOSIS — S42.252A CLOSED DISPLACED FRACTURE OF GREATER TUBEROSITY OF LEFT HUMERUS: ICD-10-CM

## 2025-05-02 PROCEDURE — 97110 THERAPEUTIC EXERCISES: CPT | Mod: GP

## 2025-05-02 PROCEDURE — 97161 PT EVAL LOW COMPLEX 20 MIN: CPT | Mod: GP

## 2025-05-02 ASSESSMENT — ENCOUNTER SYMPTOMS
DEPRESSION: 0
LOSS OF SENSATION IN FEET: 0
OCCASIONAL FEELINGS OF UNSTEADINESS: 1

## 2025-05-02 NOTE — PROGRESS NOTES
"Patient Name: Cortney Crawley  MRN: [id number]  Time Calculation  Start Time: 1043  Stop Time: 1120  Time Calculation (min): 37 min  PT Evaluation Time Entry  PT Evaluation (Low) Time Entry: 25  PT Therapeutic Procedures Time Entry  Therapeutic Exercise Time Entry: 12        Current Problem  1. Closed displaced fracture of greater tuberosity of left humerus  Referral to Physical Therapy    Follow Up In Physical Therapy        Insurance    Visit #1  [employer] MEDICARE BOA COPAY 25 DED 0  COVERAGE 90 OOP 3900(74) AUTH IS REQ AFTER EVAL       Subjective     General: Pt is a 69 y.o female presenting to clinic with c/o L shoulder pain s/t to humerus fracture from fall and catch in doorway on 4/3/25. She notes injury occurring when tripping over shoes and falling towards doorway and catching herself with L UE against from above head and hearing a \"crack\" both with catch and push away. She has been in sling for the past few weeks and is free to progressively wean as tolerated. She notes able to wean from sling both in shower and sleeping. She states she is not waking up from pain. Elevated in bed from pillows. Notes some R foot catching sometimes and states having to constantly look down at ground as result . Notes some R upper trap pain since fall s/t to favoring R shoulder completely. Pt notes occasional ache around L shoulder  Denies numbness/tingling into hand. Currently having difficulty with overhead reaching and moving arm outside of position of sling position. Main goal with therapy at this time is to return to as close to PLOF as possible without pain or limitations.    Home Set-up: 2 story home with 1st floor set-up, Tub with grab bars    PMHx: Vertigo (occasional), T2DM    Precautions:  According to most recent follow up 4/23/25:  \"30-90-90 passive range of motion for 3 weeks followed by full passive range of motion until follow-up. She can work on active elbow and hand motion \"  Pain:  Current:  1/10  High: " "10/10  Low: 0/10  Avg:  3/10    Alleviating Factors; Rest, Ice  Aggravating Factors: Prolonged positioning    Reviewed medical screening form with pt and medical screening assessed    Imaging:   X-Ray L shoulder 4/23/25:  \"IMPRESSION:  Healing comminuted fracture of the proximal humerus.\"  Objective     Shoulder Musculoskeletal Exam    Inspection    Right      Right shoulder inspection is normal.    Left      Ecchymosis: moderate      Atrophy: mild    Palpation    Right      Right shoulder palpation is normal.      Tenderness: none    Left      Tenderness: present        Rotator cuff: severe        Greater tuberosity: moderate        Lateral arm: mild    Range of Motion    Right      Active forward elevation: 110.       Shoulder active abduction: 130.       Active external rotation at side: 70.       Active external rotation in abduction: 80.       Active internal rotation in abduction: 80.       Internal rotation: L1.     Left      Active ROM: abnormal and pain.       Passive ROM: abnormal and pain.       Active forward elevation: 20.       Passive forward elevation: 90.       Shoulder active abduction: 30.       Passive abduction: 30.       Active external rotation at side: 10.     Strength    Right      External rotation: 4+/5.       Internal rotation: 4+/5.       Abduction: 4-/5.       Biceps: 4+/5.       Triceps: 4+/5.     Left      External rotation: 3/5. External rotation is affected by pain.       Internal rotation: 3/5. Internal rotation is affected by pain.       Abduction: 3/5. Abduction is affected by pain.       Biceps: 4+/5.       Triceps: 4+/5.     Strength additional comments: R/L Shoulder FF: 4-/5/3/5    Neurovascular    Right      Right shoulder nerve sensation is normal.    Left      Left shoulder nerve sensation is normal.    Scapula    Right      Position: lateral    Left       Position: lateral    Special Tests    Right      Right shoulder special tests are normal.    Left     Rotator Cuff " Signs      External rotation lag sign: negative       Belly press test: negative       Painful arc test: positive       Lift-off sign: negative     Outcome Measures:  QuickDASH:  81.82% (47 raw score)     Treatment: See HEP below  Pendulum rot/side-to-side/back and forth :30 sec ea direction  Scapular squeeze x12 3 sec hold  Elbow flexion/extension AROM with dowl x12  Theraputty squeeze with Red hand x 60 sec    EDUCATION/HEP:  Pt was educated to continue to get out of sling as often as possible to promote muscle relaxation and elongation. She was encouraged to utilize pendulums and other provided mobility exercises to decrease sensitivity prior to returning to therapy. She was instructed on the POC and expected timeline including potential motion restrictions long-term s/t to humeral fracture. Pt acknowledged understanding and was in agreement with plan.    Assessment:   Pt is a 69 y.o female coming to clinic with primary complaint of L shoulder pain s/t to proximal humerus fracture on 4/3/25 with non-operative rehab approach . On exam is demonstrating  tenderness to touch at RTC, decreased AROM and PROM along with pain upon testing and decreased strength globally. These deficits have lead to functional impairments with lifting/carrying objects/sleeping/home care/self care/driving/participation in leisure activities . Recommend skilled PT to address the aforementioned deficits and allow pt to restore PLOF and maximize functional capacity. Anticipate fair to good prognosis given active nature, support system, acute nature of condition, age and number of co-morbidities. Patient would benefit from PT services to address current impairments and facilitate improvement in current activity limits. Educated patient on current POC, initial HEP and current examination findings. Patient verbalized understanding of all education and instruction provided today.    Clinical Presentation: Stable     Level of Complexity: Low      Goals:  Patient will improve L/R shoulder flexion strength to >/=4-/5 for improved overhead lifting performance.    Patient will improve L/R shoulder abduction strength to >/=4-/5 for improved overhead lifting.    Patient will improve L/R shoulder ER strength to >/=4+/5 for improved shoulder stability with ADL performance.    Patient will improve L/R shoulder flexion AROM to >/=0-100 deg for improved overhead shoulder mobility with reaching.    Patient will improve L/R shoulder abduction AROM to >/=0-100 deg for improved lateral shoulder mobility.    Patient will improve QUICKDASH score to </= 50% to demonstrate increased independence and tolerance to all IADLs/ADLs.    Patient will be independent with HEP.    Patient will report </=1/10 shoulder pain with ADL performance.      Plan  1x/week for 8 visits     30-90-90 precautions for first 3 weeks. Progress accordingly according to protocol afterwards focusing on mobility and global UE strength and stability.     Planned interventions include: aquatic therapy, biofeedback, cryotherapy, dry needling, edema control, education/instruction, electrical stimulation, gait training, home program, hot pack, kinesiotaping, manual therapy, neuromuscular re-education, self care/home management, therapeutic activities, therapeutic exercises, ultrasound and vasopneumatic device w/ cold.

## 2025-05-08 ENCOUNTER — TELEPHONE (OUTPATIENT)
Dept: PHYSICAL THERAPY | Facility: CLINIC | Age: 70
End: 2025-05-08
Payer: MEDICARE

## 2025-05-08 NOTE — TELEPHONE ENCOUNTER
CALLED AND LEFT PT A VM TO CALL BACK AND SCHEDULE [facility] APPROVED FOLLOW UP VISITS [employer] ASKED FOR 1X 8V [facility] APPROVAL SCANNED IN MEDIA     [facility] MEDICARE APPROVED  10  PT  VISITS  5-2-25 THRU 7-11-25  AUTH# [id number]  [id number]    THANKS   PAM

## 2025-05-14 ENCOUNTER — TREATMENT (OUTPATIENT)
Dept: PHYSICAL THERAPY | Facility: CLINIC | Age: 70
End: 2025-05-14
Payer: MEDICARE

## 2025-05-14 DIAGNOSIS — S42.252A CLOSED DISPLACED FRACTURE OF GREATER TUBEROSITY OF LEFT HUMERUS: ICD-10-CM

## 2025-05-14 PROCEDURE — 97110 THERAPEUTIC EXERCISES: CPT | Mod: GP,CQ

## 2025-05-14 PROCEDURE — 97140 MANUAL THERAPY 1/> REGIONS: CPT | Mod: GP,CQ

## 2025-05-14 ASSESSMENT — PAIN SCALES - GENERAL: PAINLEVEL_OUTOF10: 2

## 2025-05-14 ASSESSMENT — PAIN DESCRIPTION - DESCRIPTORS: DESCRIPTORS: SORE

## 2025-05-14 ASSESSMENT — PAIN - FUNCTIONAL ASSESSMENT: PAIN_FUNCTIONAL_ASSESSMENT: 0-10

## 2025-05-14 NOTE — PROGRESS NOTES
"Physical Therapy Treatment    Patient Name: Cortney Crawley  MRN: [id number]  Today's Date: 5/14/2025  Time Calculation  Start Time: 1038  Stop Time: 1123  Time Calculation (min): 45 min    Insurance  Visit #2  [facility] MEDICARE BOA COPAY 25 DED 0  COVERAGE 90 OOP 3900(74) AUTH IS REQ AFTER EVAL       Current Problem  1. Closed displaced fracture of greater tuberosity of left humerus  Follow Up In Physical Therapy          Subjective    General   Pt reports feeling more pain during initial visit.  However, reports after initial visit she had no c/o pain at all.  States shoulder felt good for the remainder of the day.  She has been performing the ex's given initial visit, 2x/day.  States she has not been wearing her sling when at home & states this is also helping her shoulder to feel better.  No new complaints.  Mild soreness only today.     Precautions  30-90-90 precautions for first 3 weeks. Progress accordingly according to protocol afterwards focusing on mobility and global UE strength and stability.     Precautions  Precautions Comment: No recent falls reported    Pain  Pain Assessment  Pain Assessment: 0-10  0-10 (Numeric) Pain Score: 2  Pain Location: Shoulder  Pain Orientation: Left  Pain Radiating Towards: none reported  Pain Descriptors: Sore  Pain Frequency: Intermittent  Clinical Progression: Gradually improving  Effect of Pain on Daily Activities: Reaching, Lifting, Washing Dishes, Sleep    Objective   No c/o numbness or tingling  Fair Posture    Treatments:  Therapeutic Exercises (15931): 20 Minutes, 1 Units  Manual Therapy (73774): 23 Minutes, 2 Units    Activities:  Pendulum cw/ccw, side-to-side & back and forth: 2x30 sec ea direction  Scapular Retraction: 5\" x20  Chin Tuck: 5\" x20  Elbow flexion/extension AROM w/wand 2x10  Theraputty squeeze with Red hand x 60 sec (reviewe for HEP)    PROM: 90-90-30 to tolerance, 23 min    Assessment:   Reviewed activities initiated first visit, adding chin tuck to the " activities performed for improved postural strengthening.  She exhibits good recall of the current activities & had good follow through to any verbal, visual & tactile cues provided to reduce guarding, improve postural awareness & perform activities with correct technique.  Initiated PROM this visit, within 90-90-30 protocol, to improve overall mobility of L shoulder.  Pt has some difficulty relaxing & vc's provided to help reduce guarding, decrease any c/o pain.  Initially, presents with decreased PROM from initial visit with flexion.  However, as pt is able to relax, motion improves & she was eventually able to achieve 90 deg of flexion with minimal c/o pain.  More PROM exhibited with abduction from initial visit, with pt achieving 70 deg this visit before reporting increased discomfort.  Good ability to achieve 30 deg of ER.  Overall, good ability to complete session.  Anticipate she will be able to continue with POC & progress as tolerated, within protocol.    Plan:   Continue with POC to increase ROM/mobility of L shoulder & progress as tolerated, within protocol, to increase strength, stability and return to PLOF with daily activities.    OP EDUCATION:   Access Code: [email or web address]  URL: [email or web address]  Date: 05/14/2025  Prepared by: Rissa Smith    Exercises  - Seated Passive Cervical Retraction  - 2-3 x daily - 7 x weekly - 3 sets - 10 reps - 5 hold

## 2025-05-20 NOTE — PROGRESS NOTES
History: Cortney is here for her left  shoulder. She is 7 weeks out from a proximal humerus fracture. Her shoulder is doing well. She has been working with therapy and is making good progress. She has not been wearing her sling.     Past medical history: Multiple  Medications: Multiple  Allergies: No known drug allergies    Please refer to the intake H&P regarding the patient's review of systems, family history and social history as was done today    HEENT: Normal  Lungs: Clear to auscultation  Heart: RRR  Abdomen: Soft, nontender  Skin: clear  Extremity: She can externally rotate to about 25 degrees.  She can hold the arm at 80 degrees of abduction with mild soreness.  Elbow and hand are moving well.  Mild swelling and ecchymosis remain in the shoulder.  No numbness or tingling.  Contralateral exam is normal for strength, motion, stability and neurovascular assessment.    Radiographs: 2 view left shoulder x-rays show good alignment of the proximal humerus fracture with no change in positioning noted.     Assessment: Stable left proximal humerus fracture, 7 weeks out.     Plan: Overall her shoulder is making good progress. She can continue without the sling. She will continue working with therapy and we updated the order today. She can work on full active and passive range of motion with no weight greater than 1-2 pounds. She will try to avoid any interval falls in the meantime. We will see her back in 5-6 weeks to assess progress with repeat x-rays. If doing well at that time she can be released to her normal activity.   All questions were answered today with the patient.    Scribe Attestation:  By signing my name below, IBeth Scribe   attest that this documentation has been prepared under the direction and in the presence of Ronnie Roque MD.    Provider Attestation - Scribe documentation:  All medical record entries made by Beth Herr were at my direction and personally dictated by me. I  have reviewed the chart and agree that the record is accurate and I confirmed that it reflects my personal performance of the history, physical exam, discussion, and plan.

## 2025-05-21 ENCOUNTER — OFFICE VISIT (OUTPATIENT)
Dept: ORTHOPEDIC SURGERY | Facility: CLINIC | Age: 70
End: 2025-05-21
Payer: MEDICARE

## 2025-05-21 ENCOUNTER — HOSPITAL ENCOUNTER (OUTPATIENT)
Dept: RADIOLOGY | Facility: CLINIC | Age: 70
Discharge: HOME | End: 2025-05-21
Payer: MEDICARE

## 2025-05-21 DIAGNOSIS — S42.252A CLOSED DISPLACED FRACTURE OF GREATER TUBEROSITY OF LEFT HUMERUS, INITIAL ENCOUNTER: Primary | ICD-10-CM

## 2025-05-21 DIAGNOSIS — S42.252A CLOSED DISPLACED FRACTURE OF GREATER TUBEROSITY OF LEFT HUMERUS, INITIAL ENCOUNTER: ICD-10-CM

## 2025-05-21 PROCEDURE — 73030 X-RAY EXAM OF SHOULDER: CPT | Mod: LT

## 2025-05-21 PROCEDURE — 1159F MED LIST DOCD IN RCRD: CPT | Performed by: ORTHOPAEDIC SURGERY

## 2025-05-21 PROCEDURE — 3044F HG A1C LEVEL LT 7.0%: CPT | Performed by: ORTHOPAEDIC SURGERY

## 2025-05-21 PROCEDURE — 99212 OFFICE O/P EST SF 10 MIN: CPT | Performed by: ORTHOPAEDIC SURGERY

## 2025-05-21 PROCEDURE — 99024 POSTOP FOLLOW-UP VISIT: CPT | Performed by: ORTHOPAEDIC SURGERY

## 2025-05-21 PROCEDURE — 1036F TOBACCO NON-USER: CPT | Performed by: ORTHOPAEDIC SURGERY

## 2025-05-21 PROCEDURE — 73030 X-RAY EXAM OF SHOULDER: CPT | Mod: LEFT SIDE | Performed by: ORTHOPAEDIC SURGERY

## 2025-05-23 ENCOUNTER — TREATMENT (OUTPATIENT)
Dept: PHYSICAL THERAPY | Facility: CLINIC | Age: 70
End: 2025-05-23
Payer: MEDICARE

## 2025-05-23 DIAGNOSIS — S42.252A CLOSED DISPLACED FRACTURE OF GREATER TUBEROSITY OF LEFT HUMERUS: ICD-10-CM

## 2025-05-23 PROCEDURE — 97140 MANUAL THERAPY 1/> REGIONS: CPT | Mod: GP,CQ

## 2025-05-23 PROCEDURE — 97110 THERAPEUTIC EXERCISES: CPT | Mod: GP,CQ

## 2025-05-23 ASSESSMENT — PAIN - FUNCTIONAL ASSESSMENT: PAIN_FUNCTIONAL_ASSESSMENT: 0-10

## 2025-05-23 ASSESSMENT — PAIN SCALES - GENERAL: PAINLEVEL_OUTOF10: 0 - NO PAIN

## 2025-05-23 NOTE — PROGRESS NOTES
"Physical Therapy Treatment    Patient Name: Cortney Crawley  MRN: [id number]  Today's Date: 5/23/2025  Time Calculation  Start Time: 1021  Stop Time: 1112  Time Calculation (min): 51 min    Insurance  Visit #3  [facility] MEDICARE BOA COPAY 25 DED 0  COVERAGE 90 OOP 3900(74) AUTH IS REQ AFTER EVAL       Current Problem  1. Closed displaced fracture of greater tuberosity of left humerus  Follow Up In Physical Therapy          Subjective    General   Pt reports she has not been wearing the sling any longer & her shoulder \"feels a lot better\".  She does still keep with her for when she might have to go to store or when feeling a little more discomfort to help relax her arm.    She reports having recent follow up with MD & states visit went well.  She is able to progress per protocol.    Pt reports she was able to drive again for the first time today & states she did well.  No current complaints.  Pt reports normally having to ice for several days following PT appts.     Precautions  From 5/21/25 MD Follow Up:  She can work on full active and passive range of motion with no weight greater than 1-2 pounds.     Precautions  Precautions Comment: No recent falls reported    Pain  Pain Assessment  Pain Assessment: 0-10  0-10 (Numeric) Pain Score: 0 - No pain  Pain Location: Shoulder  Pain Orientation: Left  Pain Radiating Towards: none reported  Clinical Progression: Gradually improving  Effect of Pain on Daily Activities: Reaching, Lifting, Washing Dishes, Sleep    Objective   No c/o numbness, tingling    Treatments:  Therapeutic Exercises (71982): 33 Minutes, 2 Units  Manual Therapy (15037): 15 Minutes, 1 Units    Activities:  Scapular Retraction: 5\" x20  Chin Tuck: 5\" x20  Elbow flexion/extension AROM, 3way, 1# x15 each (no weight w/palm down)  Table Slide: Flexion x15  Table Slide: Scaption: --> Add NV  Supine CP w/wand: x15  Supine Punch: --> Add NV  Supine Flexion w/wand: x15  Supine ER w/wand: x15  Supine abduction w/wand: " --> Add NV     PROM: 90-90-30 to full PROM - to tolerance, 15 min    NOT THIS VISIT   Pendulum cw/ccw, side-to-side & back and forth: 2x30 sec ea direction (HEP)  Theraputty squeeze with Red hand x 60 sec (reviewe for HEP)    Assessment:   Pt able to achieve 90-90-30 this visit with less difficulty & decreased c/o pain.  Per protocol, she is able to progress to full PROM this visit, to tolerance.  Fair tolerance for progression of PROM.  Introduced AAROM activities this visit to improve shoulder ROM/mobility.  Table slides (flexion) & wand activities (flexion, CP, ER) were added to POC this visit and updated HEP provided.  She was able to complete with mild difficulty, discomfort noted.  Other progressions were made to current activities, as noted.  She exhibits improved postural awareness & better form with chin tucks, scapular retractions today, completing with minimal difficulty.  She is progressing appropriately with POC & anticipate she will be able to continue progressing as tolerated, per protocol.    Plan:   Continue to progress with rehab POC as tolerated, per protocol.    OP EDUCATION:   Access Code: [email or web address]  URL: [email or web address]  Date: 05/23/2025  Prepared by: Rissa Smith    Exercises  - Seated Bilateral Shoulder Flexion Towel Slide at Table Top  - 1-2 x daily - 7 x weekly - 3 sets - 10 reps - 5 hold  - Seated Shoulder Towel Slides Scaption at Table  - 1-2 x daily - 7 x weekly - 3 sets - 10 reps - 5 hold  - Supine Shoulder Press with Dowel  - 1-2 x daily - 7 x weekly - 3 sets - 10 reps  - Supine Shoulder Flexion Extension AAROM with Dowel  - 1-2 x daily - 7 x weekly - 3 sets - 10 reps  - Supine Shoulder External Rotation with Dowel  - 1-2 x daily - 7 x weekly - 3 sets - 10 reps

## 2025-05-25 PROCEDURE — RXMED WILLOW AMBULATORY MEDICATION CHARGE

## 2025-05-28 NOTE — PROGRESS NOTES
"Physical Therapy Treatment    Patient Name: Cortney Crawley  MRN: [id number]  Today's Date: 5/29/2025  Time Calculation  Start Time: 1049  Stop Time: 1131  Time Calculation (min): 42 min     PT Therapeutic Procedures Time Entry  Manual Therapy Time Entry: 15  Therapeutic Exercise Time Entry: 25                 Current Problem  1. Closed displaced fracture of greater tuberosity of left humerus  Follow Up In Physical Therapy      2. Closed displaced fracture of greater tuberosity of left humerus with routine healing, subsequent encounter            Insurance:  Visit #4  [facility] MEDICARE BOA COPAY 25 DED 0  COVERAGE 90 OOP 3900(74) AUTH IS REQ AFTER EVAL       Precautions   From 5/21/25 MD Follow Up:  She can work on full active and passive range of motion with no weight greater than 1-2 pounds.            Subjective   Subjective:   Pt reports that her shoulder is doing okay. She doesn't have pain at rest. Pt is still unable to raise her arm  Pain   0/10    Objective   Supine flex AAROM w/ wand 115*  Supine ER AAROM w/wand 31*  Treatments:  Supine CP w/wand: x15  Supine alphabet 1x  Supine Flexion w/wand: x15  Supine ER w/wand: x15  Seated L shoulder flexion w/ elbow bent and R UE assist.   Table Slide: Flexion/scaption x15ea  Standing pulleys flexion 15x  Shoulder rows YTB 15x  Shoulder ext YTB 15x  Supine abduction w/wand: --> Add NV  Scapular Retraction: 5\" x20---  Chin Tuck: 5\" x20---  Elbow flexion/extension AROM, 3way, 1# x15 each (no weight w/palm down)---     PROM L shoulder 15 min     NOT THIS VISIT   Pendulum cw/ccw, side-to-side & back and forth: 2x30 sec ea direction (HEP)  Theraputty squeeze with Red hand x 60 sec (reviewe for HEP)     OP EDUCATION:     Access Code: [email or web address]  URL: [email or web address]  Date: 05/29/2025  Prepared by: Sumi Rodriguez    Exercises  - Standing Bilateral Low Shoulder Row with Anchored Resistance  - 1 x daily - 7 x weekly - 2 sets - 10 reps  - Shoulder Extension " with Resistance  - 1 x daily - 7 x weekly - 2 sets - 10 reps  - Seated Bicep Curls Supinated with Dumbbells  - 1 x daily - 7 x weekly - 2 sets - 10 reps    Assessment:   Pt tolerated shoulder PROM well, as didn't c/o increased shoulder pain, however, is tight at end ranges. Pt able to do supine wand ex without much muscular shaking. Added alphabet for gentle shoulder stabilization. Introduced pulleys to aid with more shoulder flexion. Tactile cues given with wand Er to avoid just extending the elbows. Pt given cues with pulleys and table slides to not allow the shoulder to hike up. Reminders for scap retraction with TB rows and ext    Plan:   Increase L shoulder ROM and strength.

## 2025-05-29 ENCOUNTER — PHARMACY VISIT (OUTPATIENT)
Dept: PHARMACY | Facility: CLINIC | Age: 70
End: 2025-05-29
Payer: COMMERCIAL

## 2025-05-29 ENCOUNTER — TREATMENT (OUTPATIENT)
Dept: PHYSICAL THERAPY | Facility: CLINIC | Age: 70
End: 2025-05-29
Payer: MEDICARE

## 2025-05-29 DIAGNOSIS — S42.252D CLOSED DISPLACED FRACTURE OF GREATER TUBEROSITY OF LEFT HUMERUS WITH ROUTINE HEALING, SUBSEQUENT ENCOUNTER: ICD-10-CM

## 2025-05-29 DIAGNOSIS — S42.252A CLOSED DISPLACED FRACTURE OF GREATER TUBEROSITY OF LEFT HUMERUS: Primary | ICD-10-CM

## 2025-05-29 PROCEDURE — 97140 MANUAL THERAPY 1/> REGIONS: CPT | Mod: GP,CQ

## 2025-05-29 PROCEDURE — 97110 THERAPEUTIC EXERCISES: CPT | Mod: GP,CQ

## 2025-06-02 ENCOUNTER — TELEPHONE (OUTPATIENT)
Dept: ORTHOPEDIC SURGERY | Facility: CLINIC | Age: 70
End: 2025-06-02
Payer: MEDICARE

## 2025-06-02 NOTE — TELEPHONE ENCOUNTER
Patient called and left a VM stating she wanted Dr. Roque to know that she had to but PT on hold for her arm due to a recent fall onto her knees. She stated she did not re injure her arm in the fall.

## 2025-06-05 ENCOUNTER — APPOINTMENT (OUTPATIENT)
Dept: PHYSICAL THERAPY | Facility: CLINIC | Age: 70
End: 2025-06-05
Payer: MEDICARE

## 2025-06-12 ENCOUNTER — APPOINTMENT (OUTPATIENT)
Dept: PHYSICAL THERAPY | Facility: CLINIC | Age: 70
End: 2025-06-12
Payer: MEDICARE

## 2025-06-17 ENCOUNTER — APPOINTMENT (OUTPATIENT)
Dept: PHYSICAL THERAPY | Facility: CLINIC | Age: 70
End: 2025-06-17
Payer: MEDICARE

## 2025-06-18 NOTE — PROGRESS NOTES
"Physical Therapy Treatment    Patient Name: Cortney Crawley  MRN: [id number]  Today's Date: 6/19/2025  Time Calculation  Start Time: 1050  Stop Time: 1134  Time Calculation (min): 44 min     PT Therapeutic Procedures Time Entry  Therapeutic Exercise Time Entry: 30  Manual Therapy Time Entry: 12                 Current Problem  1. Closed displaced fracture of greater tuberosity of left humerus with routine healing, subsequent encounter        2. Closed displaced fracture of greater tuberosity of left humerus  Follow Up In Physical Therapy          Insurance:  Visit #5  [facility] MEDICARE BOA COPAY 25 DED 0  COVERAGE 90 OOP 3900(74) AUTH IS REQ AFTER EVAL   Precautions    From 5/21/25 MD Follow Up:  She can work on full active and passive range of motion with no weight greater than 1-2 pounds.            Subjective   Subjective:   Pt reports that she hasn't been to therapy for a couple of weeks, due to falling and hurting both of her knees. She couldn't really move much to drive to appts. Pt says her shoulder was okay and not injured. Pt reports her shoulder is sore today, but normally at rest doesn't hurt at all.  Pain   Soreness    Objective   Supine flex AAROM w/ wand 120*  Supine ER AAROM w/wand 32*  Treatments:  Supine CP w/wand: x15  Supine Flexion w/wand: x15  Supine ER w/wand: x15  Supine active shoulder flexion 5x2  Seated L shoulder flexion w/ elbow bent 10x  Seated 0# MB chest press,  15x ea    PROM L shoulder 12 min      Not Today:  Table Slide: Flexion/scaption x15ea  Standing pulleys flexion 15x  Supine alphabet 1x  Shoulder rows YTB 15x  Shoulder ext YTB 15x  Supine abduction w/wand: --> Add NV  Scapular Retraction: 5\" x20---  Chin Tuck: 5\" x20---  Elbow flexion/extension AROM, 3way, 1# x15 each (no weight w/palm down)---          NOT THIS VISIT   Pendulum cw/ccw, side-to-side & back and forth: 2x30 sec ea direction (HEP)  Theraputty squeeze with Red hand x 60 sec (reviewe for HEP)  OP EDUCATION:   " Supine active flexion  Seated active flexion  MB ex      Assessment:   Pt maintained shoulder ROM, despite not having therapy for a couple weeks. Introduced active supine flexion, with cues to not allow the elbow to bend or move into much abd. Also tried sitting active flexion, but with elbow bent. Pt tolerated these fairly well. Didn't do any s/l ex, due to pt knees hurting her. Good overall completion of ex given.     Plan:   Increase L shoulder ROM and strength.

## 2025-06-19 ENCOUNTER — TREATMENT (OUTPATIENT)
Dept: PHYSICAL THERAPY | Facility: CLINIC | Age: 70
End: 2025-06-19
Payer: MEDICARE

## 2025-06-19 ENCOUNTER — APPOINTMENT (OUTPATIENT)
Dept: PHYSICAL THERAPY | Facility: CLINIC | Age: 70
End: 2025-06-19
Payer: MEDICARE

## 2025-06-19 DIAGNOSIS — S42.252A CLOSED DISPLACED FRACTURE OF GREATER TUBEROSITY OF LEFT HUMERUS: ICD-10-CM

## 2025-06-19 DIAGNOSIS — S42.252D CLOSED DISPLACED FRACTURE OF GREATER TUBEROSITY OF LEFT HUMERUS WITH ROUTINE HEALING, SUBSEQUENT ENCOUNTER: Primary | ICD-10-CM

## 2025-06-19 PROCEDURE — 97110 THERAPEUTIC EXERCISES: CPT | Mod: GP,CQ

## 2025-06-19 PROCEDURE — 97140 MANUAL THERAPY 1/> REGIONS: CPT | Mod: GP,CQ

## 2025-06-26 ENCOUNTER — TREATMENT (OUTPATIENT)
Dept: PHYSICAL THERAPY | Facility: CLINIC | Age: 70
End: 2025-06-26
Payer: MEDICARE

## 2025-06-26 DIAGNOSIS — S42.252A CLOSED DISPLACED FRACTURE OF GREATER TUBEROSITY OF LEFT HUMERUS: ICD-10-CM

## 2025-06-26 PROCEDURE — 97140 MANUAL THERAPY 1/> REGIONS: CPT | Mod: GP

## 2025-06-26 PROCEDURE — 97110 THERAPEUTIC EXERCISES: CPT | Mod: GP

## 2025-06-26 NOTE — PROGRESS NOTES
"Physical Therapy Treatment    Patient Name: Cortney Crawley  MRN: [id number]  Today's Date: 6/26/2025  Time Calculation  Start Time: 1040  Stop Time: 1120  Time Calculation (min): 40 min     PT Therapeutic Procedures Time Entry  Therapeutic Exercise Time Entry: 28  Manual Therapy Time Entry: 12                 Current Problem  1. Closed displaced fracture of greater tuberosity of left humerus  Follow Up In Physical Therapy          Insurance:  Visit #6  [facility] MEDICARE BOA COPAY 25 DED 0  COVERAGE 90 OOP 3900(74) AUTH IS REQ AFTER EVAL   Precautions    From 5/21/25 MD Follow Up:  She can work on full active and passive range of motion with no weight greater than 1-2 pounds.       Subjective   Subjective:   Pt reports shoulder is doing good and states less pain and notes consistency with HEP during hold. She notes no L shoulder injury with recent fall on 5/29/25 but states continued knee pain that is slowly improving. She notes she is able to lift shoulder to shoulder height both in front and out to the side without discomfort. She notes she is able to reach cross-body and behind back with less limitation and minimal to no discomfort. She notes most limitations is in reaching overhead and has follow-up with MD next week. She is stating she is concerned with balance but has noted improvements with WBQC use and taking slower steps with focus on wider base. Otherwise, no new concerns mentioned.    Pain   1-2/10    Objective   Supine flex AAROM w/ wand 130*  Supine ER AAROM w/wand 40*    Seated AROM flex: 90 deg  Seated AROM abd: 80 deg    Treatments:  Therapeutic Exercise 93566:  Supine CP + overhead flexion w/ 1# bar: 2x15 (added to HEP)  Supine Flexion/ER/abduction w/wand: x15 5 sec hold  Supine punch with 1# bar 2x12  Seated iso ER/flex vs YTB 10\" x5 (added to HEP)  Seated 0# MB chest press,  15x ea  Supine alphabet 1x  Shoulder rows/ext RTB 15x (added to HEP)  Iso add ball squeeze + OH press 0# MB " x10      Manual [id number]:  PROM L shoulder 12 min    OP EDUCATION:   Pt encouraged to continue with current HEP and progress ROM as tolerated. She was encouraged to rest between sets and break into intervals as needed. She was encouraged to continue to use heat or ice as needed to manage pain. Pt acknowledged good understanding and was in agreement to all above information.        Assessment:   Pt demonstrated great improvements in AROM this date despite hold in POC. Despite this, she continues to display ROM limitations and strength limitations in all motions overhead (flexion/abduction) along with external rotation. She notes still having limitations with most ADLs requiring modification especially showering. She will continue to benefit from therapy and add'l visits focusing on improving overhead motion and strength.    Plan:   Cont per POC and Increase L shoulder ROM and strength.

## 2025-06-30 NOTE — PROGRESS NOTES
History: Cortney is here for her left  shoulder. She is 3 months out from a proximal humerus fracture. Her shoulder is making some progress. She had a fall on 5/29/25 and has missed a few sessions of therapy. She is worried her balance is off.     Past medical history: Multiple  Medications: Multiple  Allergies: No known drug allergies    Please refer to the intake H&P regarding the patient's review of systems, family history and social history as was done today    HEENT: Normal  Lungs: Clear to auscultation  Heart: RRR  Abdomen: Soft, nontender  Skin: clear  Extremity: Left shoulder is clear with mild swelling only.  Has a bruising in the right shoulder and knees.  She is good passive motion of the left shoulder at the side.  She hold the arm well at 90 degrees with slight scapular elevation.  Elbow wrist and hand are moving nicely.  Contralateral exam is normal for strength, motion, stability and neurovascular assessment.    Radiographs: 2 view left shoulder x-rays show good alignment of the proximal humerus fracture with no change in positioning noted.     Assessment: Stable left proximal humerus fracture, 3 months out.     Plan: Overall she is making some progress in her shoulder. We will have her continue working with therapy and the order was updated today. She can work on full active and passive motion with no weights greater than 2 pounds. We will see her back in 6-8 weeks to assess progress with 2 view x-rays.  Once she finishes shoulder therapy she will call us to set up balance therapy.   All questions were answered today with the patient.    Scribe Attestation:  By signing my name below, IBeth Scribe   attest that this documentation has been prepared under the direction and in the presence of Ronnie Roque MD.    Provider Attestation - Scribe documentation:  All medical record entries made by Beth Herr were at my direction and personally dictated by me. I have reviewed the chart  and agree that the record is accurate and I confirmed that it reflects my personal performance of the history, physical exam, discussion, and plan.

## 2025-07-02 ENCOUNTER — OFFICE VISIT (OUTPATIENT)
Dept: ORTHOPEDIC SURGERY | Facility: CLINIC | Age: 70
End: 2025-07-02
Payer: MEDICARE

## 2025-07-02 ENCOUNTER — HOSPITAL ENCOUNTER (OUTPATIENT)
Dept: RADIOLOGY | Facility: CLINIC | Age: 70
Discharge: HOME | End: 2025-07-02
Payer: MEDICARE

## 2025-07-02 DIAGNOSIS — S42.252A CLOSED DISPLACED FRACTURE OF GREATER TUBEROSITY OF LEFT HUMERUS, INITIAL ENCOUNTER: ICD-10-CM

## 2025-07-02 PROCEDURE — 99212 OFFICE O/P EST SF 10 MIN: CPT | Performed by: ORTHOPAEDIC SURGERY

## 2025-07-02 PROCEDURE — 73030 X-RAY EXAM OF SHOULDER: CPT | Mod: LT

## 2025-07-03 ENCOUNTER — TREATMENT (OUTPATIENT)
Dept: PHYSICAL THERAPY | Facility: CLINIC | Age: 70
End: 2025-07-03
Payer: MEDICARE

## 2025-07-03 DIAGNOSIS — S42.252A CLOSED DISPLACED FRACTURE OF GREATER TUBEROSITY OF LEFT HUMERUS: ICD-10-CM

## 2025-07-03 PROCEDURE — 97140 MANUAL THERAPY 1/> REGIONS: CPT | Mod: GP

## 2025-07-03 PROCEDURE — 97110 THERAPEUTIC EXERCISES: CPT | Mod: GP

## 2025-07-03 NOTE — PROGRESS NOTES
"Physical Therapy Treatment/Re-check    Patient Name: Cortney Crawley  MRN: [id number]  Today's Date: 7/3/2025  Time Calculation  Start Time: 1045  Stop Time: 1126  Time Calculation (min): 41 min     PT Therapeutic Procedures Time Entry  Therapeutic Exercise Time Entry: 31  Manual Therapy Time Entry: 10        Current Problem  1. Closed displaced fracture of greater tuberosity of left humerus  Follow Up In Physical Therapy          Insurance:  Visit #7  [employer] MEDICARE BOA COPAY 25 DED 0  COVERAGE 90 OOP 3900(74) AUTH IS REQ AFTER EVAL     [employer] MEDICARE APPROVED  10  PT  VISITS  5-2-25 THRU 7-11-25  AUTH# [id number]  [id number]/ALL    [employer] MEDICARE APPROVED  8  PT  VISITS  7-7-25 THRU 9-1-25  AUTH# [id number]  [id number]/ALL  Precautions    From 5/21/25 MD Follow Up:  She can work on full active and passive range of motion with no weight greater than 1-2 pounds.     From 7/2/25 MD Follow up: \"She can work on full active and passive motion with no weights greater than 2 pounds.\"    Subjective   Subjective:   Pt notes general improvement since starting therapy. She notes almost no pain lately only soreness. She notes seeing MD yesterday and states he is happy with progress and she is cleared to continue with strengthening protocol up to 2 pounds. She still notes all motion overhead is difficult. She notes improvement in strength and motion beneath shoulder level height. She notes feeling steadier on feet since cane height and direction was adjusted last visit. She otherwise notes no new concerns this date.    Pain   0/10    Objective     Shoulder Musculoskeletal Exam    Inspection    Right      Right shoulder inspection is normal.    Left      Left shoulder inspection is normal.    Palpation    Right      Right shoulder palpation is normal.      Tenderness: none    Left      Left shoulder palpation is normal.      Tenderness: none    Range of Motion    Right      Right shoulder range of motion is normal.     Left      Active ROM: " abnormal and no pain.       Passive ROM: abnormal and no pain.       Active forward elevation: 90.       Passive forward elevation: 110.       Shoulder active abduction: 80.       Passive abduction: 90.       Active external rotation in abduction: 30.       Active internal rotation in abduction: 70.       Internal rotation: L4.     Strength    Right      External rotation: 4+/5.       Internal rotation: 4+/5.       Abduction: 4+/5.       Biceps: 4+/5.       Triceps: 4+/5.     Left      External rotation: 4-/5.       Internal rotation: 4-/5.       Abduction: 4-/5.       Biceps: 4+/5.       Triceps: 4+/5.     Strength additional comments: Shoulder FF: 4+/5 R, 4-/5 L    Neurovascular    Right      Right shoulder nerve sensation is normal.    Left      Left shoulder nerve sensation is normal.    Scapula    Right      Right shoulder scapula is normal.    Left       Left shoulder scapula is normal.    Special Tests    Right      Right shoulder special tests are normal.    Left      Left shoulder special tests are normal.    QuickDASH: 65.91% (40 raw score)     Treatments:  Reassessment of objective measures, discussion of plan of care moving forward, discussion/modification of up to date HEP, education regarding rationale of plan for continue progression toward desired level of function 15 minutes     Therapeutic Exercise 91475:  Supine CP + overhead flexion w/ 1# bar: 2x15   Supine punch with 1# bar 2x12  Seated iso ER vs YTB 5 sec x10  Seated iso flex vs YTB 10 sec x5   Seated 0# MB chest press/OH press,  15x ea (added to HEP)  Supine alphabet 1x    Manual 66688:  PROM L shoulder 10 min    OP EDUCATION:   Pt encouraged to continue with current HEP and progress ROM as tolerated.  She was encouraged to being adding more functional tasks in with shoulder as tolerated at or below shoulder height to maintain and improve functional mobility.. Pt acknowledged good understanding and was in agreement to all above  information.        Assessment:   Re-check performed this date. Pt demonstrated great improvements in AROM this date despite hold in POC. Despite this, she continues to display ROM limitations and strength limitations in all motions overhead (flexion/abduction) along with external rotation. She demonstrates improvement in global strength but still demonstrates limitation in strength limiting functional capabilities at or above shoulder height. She notes still having limitations with most ADLs requiring modification especially showering as noted with QuickDASH performed this date. She has demonstrated progress 5/8 goals and has met 3/8 goals. See section below for more information.  She will continue to benefit from therapy and add'l visits focusing on improving overhead motion and strength.    Plan:   Cont per POC for and add'l 1x/wk for 8 weeks and Increase L shoulder ROM and strength.     Goals:  Patient will improve L/R shoulder flexion strength to >/=4-/5 for improved overhead lifting performance. (Progressing - pt demonstrates 4-/5 strength in shoulder flexion of L shoulder up to shoulder height only)     Patient will improve L/R shoulder abduction strength to >/=4-/5 for improved overhead lifting. (Progressing - pt demonstrates 4+/5 strength in shoulder abduction of L shoulder up to shoulder height only)     Patient will improve L/R shoulder ER strength to >/=4+/5 for improved shoulder stability with ADL performance. (MET - pt demonstrates 4+/5 strength in ER testing)     Patient will improve L/R shoulder flexion AROM to >/=0-100 deg for improved overhead shoulder mobility with reaching. (Progressing - pt demonstrates 0-90 deg AROM flexion of L shoulder this date)     Patient will improve L/R shoulder abduction AROM to >/=0-100 deg for improved lateral shoulder mobility. (Progressing - pt demonstrates 0-80 deg abduction AROM of L shoulder this date)     Patient will improve QUICKDASH score to </= 50% to  demonstrate increased independence and tolerance to all IADLs/ADLs. (Progressing - pt demonstrates improvement in QuickDASH score to 65.91% reported this date)     Patient will be independent with HEP. (MET - pt has great recall and confidence in progressing HEP independently at home with no concerns)       Patient will report </=1/10 shoulder pain with ADL performance. (MET - pt reports 0/10 or 1/10 pain at worst with all ADL performance)

## 2025-07-10 ENCOUNTER — TREATMENT (OUTPATIENT)
Dept: PHYSICAL THERAPY | Facility: CLINIC | Age: 70
End: 2025-07-10
Payer: MEDICARE

## 2025-07-10 ENCOUNTER — APPOINTMENT (OUTPATIENT)
Dept: PHYSICAL THERAPY | Facility: CLINIC | Age: 70
End: 2025-07-10
Payer: MEDICARE

## 2025-07-10 DIAGNOSIS — S42.252A CLOSED DISPLACED FRACTURE OF GREATER TUBEROSITY OF LEFT HUMERUS: ICD-10-CM

## 2025-07-10 DIAGNOSIS — S42.252D CLOSED DISPLACED FRACTURE OF GREATER TUBEROSITY OF LEFT HUMERUS WITH ROUTINE HEALING, SUBSEQUENT ENCOUNTER: Primary | ICD-10-CM

## 2025-07-10 PROCEDURE — 97140 MANUAL THERAPY 1/> REGIONS: CPT | Mod: GP

## 2025-07-10 PROCEDURE — 97110 THERAPEUTIC EXERCISES: CPT | Mod: GP

## 2025-07-10 NOTE — PROGRESS NOTES
"Physical Therapy Treatment/Re-check    Patient Name: Cortney Crawley  MRN: [id number]  Today's Date: 7/10/2025  Time Calculation  Start Time: 1045  Stop Time: 1125  Time Calculation (min): 40 min     PT Therapeutic Procedures Time Entry  Therapeutic Exercise Time Entry: 30  Manual Therapy Time Entry: 10        Current Problem  1. Closed displaced fracture of greater tuberosity of left humerus with routine healing, subsequent encounter        2. Closed displaced fracture of greater tuberosity of left humerus  Follow Up In Physical Therapy          Insurance:  Visit #7  [employer] MEDICARE BOA COPAY 25 DED 0  COVERAGE 90 OOP 3900(74) AUTH IS REQ AFTER EVAL     [employer] MEDICARE APPROVED  10  PT  VISITS  5-2-25 THRU 7-11-25  AUTH# [id number]  [id number]/ALL    [employer] MEDICARE APPROVED  8  PT  VISITS  7-7-25 THRU 9-1-25  AUTH# [id number]  [id number]/ALL  Precautions    From 5/21/25 MD Follow Up:  She can work on full active and passive range of motion with no weight greater than 1-2 pounds.     From 7/2/25 MD Follow up: \"She can work on full active and passive motion with no weights greater than 2 pounds.\"    Subjective   Subjective:  Pt states her shoulder is doing okay. She states she was a little sore after her last visit.     Pain   0/10    Objective       Treatments:    Therapeutic Exercise 10640:  Supine CP + overhead flexion w/ 1# bar: 2x15   Supine punch with 1# bar 2x15  Supine Shoulder flexion x10  Seated iso ER vs YTB 2x10 5s  Seated iso flex vs YTB 2x10 5s  Seated 0# MB series  Supine alphabet 1x  Row 2x10 YTB  Seated shoulder flexion x10  Wall slide FF x10  Manual 05658:  PROM L shoulder 10 min    OP EDUCATION:    Access Code: [email or web address]  URL: [email or web address]  Date: 07/10/2025  Prepared by: Ni Santana    Exercises  - Shoulder Flexion Wall Slide with Towel  - 1 x daily - 7 x weekly - 1 sets - 10 reps      Assessment:  Pt tolerated treatment session well this date. Able to add seated shoulder flexion " AROM and flexion wall slides which were both fatiguing for pt. She had no pain with exercises or PROM this date. Pt continues to be mostly limited in abduction but this was improved passively in comparison to recheck measurements. Pt will continue to benefit from skilled therapy in order to reach her goals.    Plan:  Continue to progress as tolerated with focus on AROM

## 2025-07-15 ENCOUNTER — OFFICE VISIT (OUTPATIENT)
Dept: ORTHOPEDIC SURGERY | Facility: CLINIC | Age: 70
End: 2025-07-15
Payer: MEDICARE

## 2025-07-15 DIAGNOSIS — M79.89 SWELLING OF BOTH LOWER EXTREMITIES: ICD-10-CM

## 2025-07-15 DIAGNOSIS — R60.0 BILATERAL LOWER EXTREMITY EDEMA: ICD-10-CM

## 2025-07-15 PROCEDURE — 3044F HG A1C LEVEL LT 7.0%: CPT | Performed by: FAMILY MEDICINE

## 2025-07-15 PROCEDURE — 99212 OFFICE O/P EST SF 10 MIN: CPT | Performed by: FAMILY MEDICINE

## 2025-07-15 PROCEDURE — 99213 OFFICE O/P EST LOW 20 MIN: CPT | Performed by: FAMILY MEDICINE

## 2025-07-15 PROCEDURE — 1159F MED LIST DOCD IN RCRD: CPT | Performed by: FAMILY MEDICINE

## 2025-07-15 PROCEDURE — 1036F TOBACCO NON-USER: CPT | Performed by: FAMILY MEDICINE

## 2025-07-15 NOTE — PROGRESS NOTES
Acute Injury New Patient Visit  Assessment & Plan  Bilateral lower extremity edema  Likely due to fluid overload, possibly related to cardiac function or medication side effects. Symmetric edema without pain suggests a systemic cause. Not suspected to be orthopedic or related to a blood clot.  - Recommend compression stockings.  - Provide foot and ankle exercises.  - Advise leg elevation above heart when sitting or resting.  - Instruct to follow up with primary care for evaluation of cardiac function and medication review.    Breast cancer  Bilateral edema and lack of lymph node involvement make lymphedema related to breast cancer less likely.  - Discuss with primary care to rule out lymphedema related to breast cancer.  No orders of the defined types were placed in this encounter.    Procedures   At the conclusion of the visit there were no further questions by the patient/family regarding their plan of care.  Patient was instructed to call or return with any issues, questions, or concerns regarding their injury and/or treatment plan described above.    PHYSICAL EXAM:  General:  Patient is awake, alert, and oriented to person place and time.  Patient appears well nourished and well kept.  Affect Calm, Not Acutely Distressed.  Heent:  Normocephalic, Atraumatic, EOMI  Cardiovascular:  Hemodynamically stable.  Respiratory:  Normal respirations with unlabored breathing.  Neuro: Gross sensation intact to the lower extremities bilaterally.  Extremity: Bilateral lower extremity exam:  Physical Exam  EXTREMITIES: 1-2+ pitting edema up to pre-tibia and anterior tibia plateau bilaterally. Calves soft and non-tender. Full range of motion in plantar flexion, dorsiflexion, eversion, and inversion. No midfoot or distal metatarsal pain, negative field tapioca. No pain to medial or lateral malleolus. Swelling equal and symmetric bilaterally. Swelling noted on intravenous aspect of right shin.    IMAGING:   Results  No new  imaging today        Patient ID: Cortney Crawley is a 69 y.o. female who presents for Pain of the Left Foot and Pain of the Right Foot.  History of Present Illness  Cortney Crawley is a 69 year old female who presents with right foot and ankle swelling following a fall six weeks ago.    She experienced swelling in her right foot and ankle after a fall on May 29, 2025. Initially, she injured her knees and foot, but swelling developed later, with bruising appearing almost three weeks post-injury on the sides of both feet. The swelling in her feet persists.    There is no pain associated with the swelling. No history of peripheral neuropathy, blood clots, or deep vein thrombosis. She can feel touch sensations and move her ankle without pain. This type of swelling has not occurred before. Compression stockings were tried but caused discomfort due to knee soreness.    The swelling does not significantly decrease with elevation, although wearing a non-compression sleeve seemed to help reduce it. She has a history of breast cancer with surgery and lymph node evaluation showing no cancer.        This medical note was created with the assistance of artificial intelligence (AI) for documentation purposes. The content has been reviewed and confirmed by the healthcare provider for accuracy and completeness. Patient consented to the use of audio recording and use of AI during their visit.   07/15/25 at 5:33 PM - Cole C Budinsky, MD  Office:  [phone]

## 2025-07-17 ENCOUNTER — TREATMENT (OUTPATIENT)
Dept: PHYSICAL THERAPY | Facility: CLINIC | Age: 70
End: 2025-07-17
Payer: MEDICARE

## 2025-07-17 DIAGNOSIS — S42.252A CLOSED DISPLACED FRACTURE OF GREATER TUBEROSITY OF LEFT HUMERUS: ICD-10-CM

## 2025-07-17 PROCEDURE — 97110 THERAPEUTIC EXERCISES: CPT | Mod: GP

## 2025-07-17 PROCEDURE — 97140 MANUAL THERAPY 1/> REGIONS: CPT | Mod: GP

## 2025-07-17 NOTE — PROGRESS NOTES
"Physical Therapy Treatment    Patient Name: Cortney Crawley  MRN: [id number]  Today's Date: 7/17/2025  Time Calculation  Start Time: 1042  Stop Time: 1122  Time Calculation (min): 40 min     PT Therapeutic Procedures Time Entry  Therapeutic Exercise Time Entry: 30  Manual Therapy Time Entry: 10        Current Problem  1. Closed displaced fracture of greater tuberosity of left humerus  Follow Up In Physical Therapy          Insurance:  Visit #8  [employer] MEDICARE BOA COPAY 25 DED 0  COVERAGE 90 OOP 3900(74) AUTH IS REQ AFTER EVAL     [employer] MEDICARE APPROVED  10  PT  VISITS  5-2-25 THRU 7-11-25  AUTH# [id number]  [id number]/ALL    [employer] MEDICARE APPROVED  8  PT  VISITS  7-7-25 THRU 9-1-25  AUTH# [id number]  [id number]/ALL  Precautions    From 5/21/25 MD Follow Up:  She can work on full active and passive range of motion with no weight greater than 1-2 pounds.     From 7/2/25 MD Follow up: \"She can work on full active and passive motion with no weights greater than 2 pounds.\"    Subjective   Subjective:  Pt notes no pain this date and states noticing improvement in shoulder ROM week to week. She states she is able to achieve shoulder level height without any discomfort both when reaching to side and in front. She notes most difficult activity still is reaching behind head when showering. Otherwise, no new concerns mentioned this date.    Pain   0/10    Objective   AROM L shoulder flex 100 deg  AROM L shoulder abd 90 deg  Treatments:  Therapeutic Exercise 20404:  Supine CP + overhead flexion w/ 1# bar: 2x15   Supine punch with 1# bar 2x15  Supine Shoulder flexion with 1# bar 2x10  Seated ER/flex vs YTB 2x10 ea (added to HEP)  Seated 1# MB series  Standing I, T, Y 0# added x10 ea   Row 2x10 RTB  Jobes 2x10 ea (added to HEP)  Wall slide FF x10 3 sec hold    Manual 11309:  PROM L shoulder 10 min    OP EDUCATION:  Pt encouraged to continue with current HEP and focus on mobility exercises daily to maintain and improve current mobility. She " was encouraged to focus on functional use of L shoulder as much as possible to build functional strength and maintain mobility. Pt acknowledged good understanding and was in agreement to all above information.      Assessment:  Pt tolerated session well demonstrating improved A/PROM in all planes. She continues to demonstrate most restrictions in ER. She tolerated all exercises well especially with advancements in resistance to each exercise. Added 1# bar to supine flexion with good pt tolerance and appropriate fatigue by end of session. Added I, T,Y to improve multiplanar strength of shoulder with good pt tolerance. Pt will continue to benefit from therapy focusing on improving ROM and functional strength s/t to humeral fracture.    Plan:  Continue to progress as tolerated with focus on AROM

## 2025-07-21 ENCOUNTER — APPOINTMENT (OUTPATIENT)
Dept: PRIMARY CARE | Facility: CLINIC | Age: 70
End: 2025-07-21
Payer: MEDICARE

## 2025-07-24 ENCOUNTER — APPOINTMENT (OUTPATIENT)
Dept: PHYSICAL THERAPY | Facility: CLINIC | Age: 70
End: 2025-07-24
Payer: MEDICARE

## 2025-07-31 ENCOUNTER — APPOINTMENT (OUTPATIENT)
Dept: PRIMARY CARE | Facility: CLINIC | Age: 70
End: 2025-07-31
Payer: MEDICARE

## 2025-07-31 VITALS
HEART RATE: 76 BPM | OXYGEN SATURATION: 99 % | BODY MASS INDEX: 39.21 KG/M2 | HEIGHT: 66 IN | RESPIRATION RATE: 14 BRPM | WEIGHT: 244 LBS

## 2025-07-31 DIAGNOSIS — E78.5 HYPERLIPIDEMIA, UNSPECIFIED HYPERLIPIDEMIA TYPE: ICD-10-CM

## 2025-07-31 DIAGNOSIS — Z00.00 MEDICARE ANNUAL WELLNESS VISIT, SUBSEQUENT: Primary | ICD-10-CM

## 2025-07-31 DIAGNOSIS — E11.9 DIABETES MELLITUS WITHOUT COMPLICATION: ICD-10-CM

## 2025-07-31 DIAGNOSIS — E11.69 TYPE 2 DIABETES MELLITUS WITH OTHER SPECIFIED COMPLICATION, WITHOUT LONG-TERM CURRENT USE OF INSULIN: ICD-10-CM

## 2025-07-31 DIAGNOSIS — E55.9 MILD VITAMIN D DEFICIENCY: ICD-10-CM

## 2025-07-31 DIAGNOSIS — E53.8 VITAMIN B12 DEFICIENCY: ICD-10-CM

## 2025-07-31 LAB — POC HEMOGLOBIN A1C: 5.9 % (ref 4.2–6.5)

## 2025-07-31 PROCEDURE — 83036 HEMOGLOBIN GLYCOSYLATED A1C: CPT | Performed by: INTERNAL MEDICINE

## 2025-07-31 PROCEDURE — 99214 OFFICE O/P EST MOD 30 MIN: CPT | Performed by: INTERNAL MEDICINE

## 2025-07-31 PROCEDURE — 1124F ACP DISCUSS-NO DSCNMKR DOCD: CPT | Performed by: INTERNAL MEDICINE

## 2025-07-31 PROCEDURE — 1170F FXNL STATUS ASSESSED: CPT | Performed by: INTERNAL MEDICINE

## 2025-07-31 PROCEDURE — 3008F BODY MASS INDEX DOCD: CPT | Performed by: INTERNAL MEDICINE

## 2025-07-31 PROCEDURE — 1159F MED LIST DOCD IN RCRD: CPT | Performed by: INTERNAL MEDICINE

## 2025-07-31 PROCEDURE — 3044F HG A1C LEVEL LT 7.0%: CPT | Performed by: INTERNAL MEDICINE

## 2025-07-31 RX ORDER — PRAVASTATIN SODIUM 10 MG/1
10 TABLET ORAL DAILY
Qty: 90 TABLET | Refills: 3 | Status: SHIPPED | OUTPATIENT
Start: 2025-07-31

## 2025-07-31 ASSESSMENT — ENCOUNTER SYMPTOMS
MYALGIAS: 0
CHILLS: 0
ARTHRALGIAS: 1
COUGH: 0
VOMITING: 0
SHORTNESS OF BREATH: 0
DIAPHORESIS: 0
CONSTIPATION: 0
FEVER: 0
JOINT SWELLING: 0
DIARRHEA: 0
NAUSEA: 0

## 2025-07-31 ASSESSMENT — ACTIVITIES OF DAILY LIVING (ADL)
BATHING: INDEPENDENT
DOING_HOUSEWORK: INDEPENDENT
MANAGING_FINANCES: INDEPENDENT
DRESSING: INDEPENDENT
TAKING_MEDICATION: INDEPENDENT
GROCERY_SHOPPING: INDEPENDENT

## 2025-07-31 NOTE — ASSESSMENT & PLAN NOTE
Orders:    pravastatin (Pravachol) 10 mg tablet; Take 1 tablet (10 mg) by mouth once daily.    Lipid Panel; Future    TSH with reflex to Free T4 if abnormal; Future    Comprehensive Metabolic Panel; Future    CBC; Future

## 2025-07-31 NOTE — PATIENT INSTRUCTIONS
FASTING LABS ARE ORDERED FOR YOU    2.  THE RIGHT BREAST ULTRASOUND NEEDS TO BE COMPLETED, WE WILL CALL AND CLARIFY WITH THE BREAST CENTER AT [facility] ON [address]    3.  A1C LEVEL TODAY IS 5.9% = EXCELLENT DIABETIC CONTROL.  GOAL IS <7.0%.  CONTINUE PRESENT MEDS FOR NOW    4.  YOU ARE OTHERWISE CAUGHT UP FROM A HEALTH SCREENING AND MAINTENANCE STANDPOINT FOR MEDICARE    5.  I AGREE WITH BALANCE THERAPY AFTER  SHOULDER PT IS DONE FOR THE LEFT HUMERUS FRACTURE WHICH APPEARS TO BE HEALING NICELY PER THE XRAY 7/2/25    6.  REGULAR EYE EXAMS  ARE RECOMMENDED FOR YOU    7.  NEXT COLONOSCOPY 2029    8.  FOLLOW UP 12 MONTHS OR AS NEEDED.

## 2025-08-01 ENCOUNTER — TREATMENT (OUTPATIENT)
Dept: PHYSICAL THERAPY | Facility: CLINIC | Age: 70
End: 2025-08-01
Payer: MEDICARE

## 2025-08-01 DIAGNOSIS — S42.252A CLOSED DISPLACED FRACTURE OF GREATER TUBEROSITY OF LEFT HUMERUS: ICD-10-CM

## 2025-08-01 PROCEDURE — 97110 THERAPEUTIC EXERCISES: CPT | Mod: GP

## 2025-08-01 PROCEDURE — 97140 MANUAL THERAPY 1/> REGIONS: CPT | Mod: GP

## 2025-08-01 PROCEDURE — RXMED WILLOW AMBULATORY MEDICATION CHARGE

## 2025-08-01 NOTE — PROGRESS NOTES
Physical Therapy Treatment    Patient Name: Cortney Crawley  MRN: [id number]  Time Calculation  Start Time: 0950  Stop Time: 1028  Time Calculation (min): 38 min                         Insurance:  Visit #9/13 (2/8 for new POC)   [facility] MEDICARE BOA COPAY 25 DED 0  COVERAGE 90 OOP [phone]) AUTH IS REQ AFTER EVAL        [employer] MEDICARE APPROVED  8  PT  VISITS  7-7-25 THRU 9-1-25  AUTH# [id number]  [id number]/ALL    Current Problem  1. Closed displaced fracture of greater tuberosity of left humerus  Follow Up In Physical Therapy          Subjective   General  Pt states no significant changes     Precautions  Full A/AROM/PROM  1-2#     Pain  0/10    Objective     Treatments:  UBE 2/2 fwd/bwd     Manual 76123:  PROM L shoulder 10 min    Supine overhead flexion w/ 2# bar: 2 x 10  Supine CP ER x 20  Supine punch with 2# bar 2x15  Seated cane abduction 1# x 20  PB wall ball x 15  Wall slides + lift flex/scap/abd x 12   ER door way stretch 2 x 20s   Row 2x10 RTB  Shoulder ext RTB 2 x 10  IR/Er 2 x 10 red TB       Assessment   Pt still displaying more limitation in PROM vs AROM. Tolerating resistance without increase in pain. Encouraged pt to still continue AAROM exercises at home. Pt stated increased mm soreness after wall slide variations. Pt continues to benefit from skilled PT in order to keep progressing towards their goals.       Plan   Continue current POC, progress as tolerated

## 2025-08-04 ENCOUNTER — PHARMACY VISIT (OUTPATIENT)
Dept: PHARMACY | Facility: CLINIC | Age: 70
End: 2025-08-04
Payer: COMMERCIAL

## 2025-08-06 ENCOUNTER — HOSPITAL ENCOUNTER (OUTPATIENT)
Dept: RADIOLOGY | Facility: CLINIC | Age: 70
Discharge: HOME | End: 2025-08-06
Payer: MEDICARE

## 2025-08-06 ENCOUNTER — OFFICE VISIT (OUTPATIENT)
Dept: ORTHOPEDIC SURGERY | Facility: CLINIC | Age: 70
End: 2025-08-06
Payer: MEDICARE

## 2025-08-06 DIAGNOSIS — S42.252A CLOSED DISPLACED FRACTURE OF GREATER TUBEROSITY OF LEFT HUMERUS, INITIAL ENCOUNTER: ICD-10-CM

## 2025-08-06 PROCEDURE — 1159F MED LIST DOCD IN RCRD: CPT | Performed by: ORTHOPAEDIC SURGERY

## 2025-08-06 PROCEDURE — 73030 X-RAY EXAM OF SHOULDER: CPT | Mod: LT

## 2025-08-06 PROCEDURE — 3044F HG A1C LEVEL LT 7.0%: CPT | Performed by: ORTHOPAEDIC SURGERY

## 2025-08-06 PROCEDURE — 99213 OFFICE O/P EST LOW 20 MIN: CPT | Performed by: ORTHOPAEDIC SURGERY

## 2025-08-06 PROCEDURE — 73030 X-RAY EXAM OF SHOULDER: CPT | Mod: LEFT SIDE | Performed by: ORTHOPAEDIC SURGERY

## 2025-08-06 NOTE — PROGRESS NOTES
History: Cortney is here for her left shoulder.  She is 4 months out from a proximal humerus fracture.  She continues to work with therapy and feels she is making progress.  Her pain is improving.  She has been having some falls.    Past medical history: Multiple  Medications: Multiple  Allergies: No known drug allergies    Please refer to the intake H&P regarding the patient's review of systems, family history and social history as was done today    HEENT: Normal  Lungs: Clear to auscultation  Heart: RRR  Abdomen: Soft, nontender  Skin: clear  Extremity: She can get the arm overhead to 140 degrees.  She has decent passive external rotation at the side.  Internal rotation is to the sacrum.  She has 5 out of 5 strength in all groups tested.  Elbow and hand are moving well.  No numbness or tingling.  Contralateral exam is normal for strength, motion, stability and neurovascular assessment.    Radiographs: 4 view left shoulder x-rays show continued healing of her proximal humerus fracture with slight valgus alignment.  Good alignment of the glenohumeral joint.    Assessment: Healing left proximal humerus fracture, 4 months out    Plan: Her x-rays look good today.  She still has a bit of weakness and will continue to finish out her formal therapy program.  She has visit scheduled out for the next month.  Then she can convert to more of a balance training program to help decrease her falls.  Would be happy to see her back as needed.  All questions were answered today with the patient.    For complete plan and/or surgical details, please refer to Dr. Roque's portion of this split/shared dictation.     Audrey Tena PA-C    In a face-to-face encounter today, DAGMAR Roque MD performed a history and physical examination, discussed pertinent diagnostic studies if indicated, and discussed diagnosis and management strategies with both the patient and the midlevel provider.  I reviewed the midlevel's note and  agree with the documented findings and plan of care.  Greater than 50% of the evaluation and treatment decision was performed by the physician myself during today's visit.    Overall Cortney continues to improve.  She is getting better function and motion in her shoulder and her pain is improved.  I would recommend that she finish out her therapy to have some additional strengthening.  She can then convert to home program.  She also like to do some balance training as her gait continues to be off.  Otherwise if her shoulder is stable we can see her back as needed.    This note was generated with voice recognition software and may contain grammatical errors.

## 2025-08-07 ENCOUNTER — TREATMENT (OUTPATIENT)
Dept: PHYSICAL THERAPY | Facility: CLINIC | Age: 70
End: 2025-08-07
Payer: MEDICARE

## 2025-08-07 DIAGNOSIS — S42.252D CLOSED DISPLACED FRACTURE OF GREATER TUBEROSITY OF LEFT HUMERUS WITH ROUTINE HEALING, SUBSEQUENT ENCOUNTER: Primary | ICD-10-CM

## 2025-08-07 DIAGNOSIS — S42.252A CLOSED DISPLACED FRACTURE OF GREATER TUBEROSITY OF LEFT HUMERUS: ICD-10-CM

## 2025-08-07 PROCEDURE — 97110 THERAPEUTIC EXERCISES: CPT | Mod: GP,CQ

## 2025-08-07 PROCEDURE — 97140 MANUAL THERAPY 1/> REGIONS: CPT | Mod: GP,CQ

## 2025-08-07 NOTE — PROGRESS NOTES
Physical Therapy Treatment    Patient Name: Cortney Crawley  MRN: [id number]  Today's Date: 8/7/2025                          Current Problem  1. Closed displaced fracture of greater tuberosity of left humerus with routine healing, subsequent encounter        2. Closed displaced fracture of greater tuberosity of left humerus  Follow Up In Physical Therapy          Physical Therapy Treatment    Patient Name: Cortney Crawley  MRN: [id number]  YOB: 1955  Encounter Date: 8/7/2025    Physical Therapy Treatment    Patient Name: Cortney Crawley  MRN: [id number]  YOB: 1955  Encounter Date: 8/7/2025    Time Entry:  Time Calculation  Start Time: 1045  Stop Time: 1133  Time Calculation (min): 48 min     PT Therapeutic Procedures Time Entry  Therapeutic Exercise Time Entry: 35  Manual Therapy Time Entry: 10                   Rehab Insurance Information:   Visit Count: 2  POC Visits: 8  Auth Required: Yes  Authorized Visits: 8  Auth Date Range: 07/07/25  Through: 09/01/25        Additional Authorization/Insurance Information: [facility] MEDICARE APPROVED  8  PT  VISITS  7-7-25 THRU 9-1-25  AUTH# [id number]  [id number]/ALL  Total visit# 9/13    Rehab Falls Risk Assessment:  Fall Risk Indicated: Yes  Factors for High Risk for Falls: Multiple falls in the past 6 months      Problem List Items Addressed This Visit           ICD-10-CM    Closed displaced fracture of greater tuberosity of left humerus - Primary S42.252A       Precautions       Additional Precautions and Protocol Details: Full A/AROM/PROM 1-2#  , MD note 8/6/25: bit of weakness and will continue to finish out her formal therapy program.    Subjective   Pt reports that her shoulder is doing pretty good. She is not having too much difficulty with anything around the house. She saw MD yesterday, who told her to finish up her therapy visits, and then maybe do some balance therapy, as she has had some falls..  Pain reported as 0.           Objective                Activities   Therapeutic Exercise  Therapeutic Exercise Performed: Yes  Therapeutic Exercise Activity 1: UBE 2/2 fwd/bwd  Therapeutic Exercise Activity 2: Supine overhead flexion w/ 2# bar: 2 x 10  Therapeutic Exercise Activity 3: supine active flexion 0# 15x  Therapeutic Exercise Activity 4: Supine active abduction 0# 15x  Therapeutic Exercise Activity 5: supine shoulder horz abd RTB 2x10  Therapeutic Exercise Activity 6: Supine B ER YTB 2x10  Therapeutic Exercise Activity 7: Seated active shoulder flexion with elbow flexed 10x2  Therapeutic Exercise Activity 8: Shoulder rows/ext GTB 2x10  Therapeutic Exercise Activity 9: Wall slides + lift flex  Therapeutic Exercise Activity 10: wall push up 15x  Therapeutic Exercise Activity 11: seated MB CP, OH, bus 2.5# x 20 ea  Therapeutic Exercise Activity 12: Seated bicep curl GTb 2x10               Manual Therapy  Manual Therapy Performed: Yes  Manual Therapy Activity 1: PROM L shoulder 10'                                       Education  Education was done with Patient.           Access Code: [email or web address] URL: [email or web address] Date: 08/07/2025 Prepared by: Sumi Rodriguez Exercises - Supine Shoulder Flexion Extension Full Range AROM  - 1 x daily - 7 x weekly - 2 sets - 10 reps - Supine Shoulder Abduction AROM  - 1 x daily - 7 x weekly - 2 sets - 10 reps - Supine Bilateral Shoulder External Rotation with Resistance around Wrists  - 1 x daily - 7 x weekly - 2 sets - 10 reps - Supine Shoulder Horizontal Abduction with Resistance  - 1 x daily - 7 x weekly - 2 sets - 10 reps - Standing Single Arm Elbow Flexion with Resistance  - 1 x daily - 7 x weekly - 2 sets - 10 reps      Assessment/Plan   Assessment: Pt displays improving shoulder motion with stretches. Did well with supine active flexion of the shoulder. Pt displayed limited strength with abd, as only able to get to just over 90*. Added supine horz abd for gentle strengthening without hiking  up the shoulders.Able to increase tension for TB rows/ext. Still has some shoulder hiking with active shoulder flexion. Good form utilized with TB ER. Pt fatigued with wall push ups, Had difficulty completing full ROM for bicep curl due to weakness.        Plan: Cont to increase shoulder strength, limit shoulder hiking when standing                            Rehab Insurance Information:   Visit Count: 2  POC Visits: 8  Auth Required: Yes  Authorized Visits: 8  Auth Date Range: 07/07/25  Through: 09/01/25        Additional Authorization/Insurance Information: [facility] MEDICARE APPROVED  8  PT  VISITS  7-7-25 THRU 9-1-25  AUTH# [id number]  [id number]/ALL  Total visit# 9/13    Rehab Falls Risk Assessment:  Fall Risk Indicated: Yes  Factors for High Risk for Falls: Multiple falls in the past 6 months      Problem List Items Addressed This Visit           ICD-10-CM    Closed displaced fracture of greater tuberosity of left humerus - Primary S42.252A       Precautions       Additional Precautions and Protocol Details: Full A/AROM/PROM 1-2#  , MD note 8/6/25: bit of weakness and will continue to finish out her formal therapy program.    Subjective   Pt reports that her shoulder is doing pretty good. She is not having too much difficulty with anything around the house. She saw MD yesterday, who told her to finish up her therapy visits, and then maybe do some balance therapy, as she has had some falls..  Pain reported as 0.           Objective               Activities   Therapeutic Exercise  Therapeutic Exercise Performed: Yes  Therapeutic Exercise Activity 1: UBE 2/2 fwd/bwd  Therapeutic Exercise Activity 2: Supine overhead flexion w/ 2# bar: 2 x 10  Therapeutic Exercise Activity 3: supine active flexion 0# 15x  Therapeutic Exercise Activity 4: Supine active abduction 0# 15x  Therapeutic Exercise Activity 5: supine shoulder horz abd RTB 2x10  Therapeutic Exercise Activity 6: Supine B ER YTB 2x10  Therapeutic Exercise Activity 7:  Seated active shoulder flexion with elbow flexed 10x2  Therapeutic Exercise Activity 8: Shoulder rows/ext GTB 2x10  Therapeutic Exercise Activity 9: Wall slides + lift flex  Therapeutic Exercise Activity 10: wall push up 15x  Therapeutic Exercise Activity 11: seated MB CP, OH, bus 2.5# x 20 ea  Therapeutic Exercise Activity 12: Seated bicep curl GTb 2x10               Manual Therapy  Manual Therapy Performed: Yes  Manual Therapy Activity 1: PROM L shoulder 10'                                       Education  Education was done with Patient.           Access Code: [email or web address] URL: [email or web address] Date: 08/07/2025 Prepared by: Sumi Rodriguez Exercises - Supine Shoulder Flexion Extension Full Range AROM  - 1 x daily - 7 x weekly - 2 sets - 10 reps - Supine Shoulder Abduction AROM  - 1 x daily - 7 x weekly - 2 sets - 10 reps - Supine Bilateral Shoulder External Rotation with Resistance around Wrists  - 1 x daily - 7 x weekly - 2 sets - 10 reps - Supine Shoulder Horizontal Abduction with Resistance  - 1 x daily - 7 x weekly - 2 sets - 10 reps - Standing Single Arm Elbow Flexion with Resistance  - 1 x daily - 7 x weekly - 2 sets - 10 reps      Assessment/Plan   Assessment: Pt displays improving shoulder motion with stretches. Did well with supine active flexion of the shoulder. Pt displayed limited strength with abd, as only able to get to just over 90*. Added supine horz abd for gentle strengthening without hiking up the shoulders.Able to increase tension for TB rows/ext. Still has some shoulder hiking with active shoulder flexion. Good form utilized with TB ER. Pt fatigued with wall push ups, Had difficulty completing full ROM for bicep curl due to weakness.        Plan: Cont to increase shoulder strength, limit shoulder hiking when standing

## 2025-08-14 ENCOUNTER — TREATMENT (OUTPATIENT)
Dept: PHYSICAL THERAPY | Facility: CLINIC | Age: 70
End: 2025-08-14
Payer: MEDICARE

## 2025-08-14 DIAGNOSIS — S42.252D CLOSED DISPLACED FRACTURE OF GREATER TUBEROSITY OF LEFT HUMERUS WITH ROUTINE HEALING, SUBSEQUENT ENCOUNTER: Primary | ICD-10-CM

## 2025-08-14 DIAGNOSIS — S42.252A CLOSED DISPLACED FRACTURE OF GREATER TUBEROSITY OF LEFT HUMERUS: ICD-10-CM

## 2025-08-14 PROCEDURE — 97140 MANUAL THERAPY 1/> REGIONS: CPT | Mod: GP,CQ

## 2025-08-14 PROCEDURE — 97110 THERAPEUTIC EXERCISES: CPT | Mod: GP,CQ

## 2025-08-21 ENCOUNTER — HOSPITAL ENCOUNTER (OUTPATIENT)
Dept: WOMENS IMAGING | Age: 70
Discharge: HOME | End: 2025-08-23
Payer: MEDICARE

## 2025-08-21 ENCOUNTER — HOSPITAL ENCOUNTER (OUTPATIENT)
Dept: ULTRASOUND IMAGING | Age: 70
Discharge: HOME | End: 2025-08-23
Payer: MEDICARE

## 2025-08-21 ENCOUNTER — TREATMENT (OUTPATIENT)
Dept: PHYSICAL THERAPY | Facility: CLINIC | Age: 70
End: 2025-08-21
Payer: MEDICARE

## 2025-08-21 DIAGNOSIS — Z17.0 CARCINOMA OF UPPER-OUTER QUADRANT OF RIGHT BREAST IN FEMALE, ESTROGEN RECEPTOR POSITIVE (HCC): ICD-10-CM

## 2025-08-21 DIAGNOSIS — S42.252A CLOSED DISPLACED FRACTURE OF GREATER TUBEROSITY OF LEFT HUMERUS: ICD-10-CM

## 2025-08-21 DIAGNOSIS — N63.41 SUBAREOLAR MASS OF RIGHT BREAST: ICD-10-CM

## 2025-08-21 DIAGNOSIS — N64.9 BREAST DISORDER: ICD-10-CM

## 2025-08-21 DIAGNOSIS — C50.411 CARCINOMA OF UPPER-OUTER QUADRANT OF RIGHT BREAST IN FEMALE, ESTROGEN RECEPTOR POSITIVE (HCC): ICD-10-CM

## 2025-08-21 PROCEDURE — 97110 THERAPEUTIC EXERCISES: CPT | Mod: GP

## 2025-08-21 PROCEDURE — 77065 DX MAMMO INCL CAD UNI: CPT

## 2025-08-28 ENCOUNTER — TREATMENT (OUTPATIENT)
Dept: PHYSICAL THERAPY | Facility: CLINIC | Age: 70
End: 2025-08-28
Payer: MEDICARE

## 2025-08-28 ENCOUNTER — RESULTS FOLLOW-UP (OUTPATIENT)
Age: 70
End: 2025-08-28

## 2025-08-28 DIAGNOSIS — S42.252A CLOSED DISPLACED FRACTURE OF GREATER TUBEROSITY OF LEFT HUMERUS: ICD-10-CM

## 2025-08-28 DIAGNOSIS — Z12.31 ENCOUNTER FOR SCREENING MAMMOGRAM FOR BREAST CANCER: Primary | ICD-10-CM

## 2025-08-28 PROCEDURE — 97110 THERAPEUTIC EXERCISES: CPT | Mod: GP

## 2025-09-26 ENCOUNTER — APPOINTMENT (OUTPATIENT)
Dept: PHARMACY | Facility: HOSPITAL | Age: 70
End: 2025-09-26
Payer: MEDICARE

## 2026-08-12 ENCOUNTER — APPOINTMENT (OUTPATIENT)
Dept: PRIMARY CARE | Facility: CLINIC | Age: 71
End: 2026-08-12
Payer: MEDICARE

## (undated) DEVICE — PLASMABLADE X PS210-030S-LIGHT 3.0SL: Brand: PLASMABLADE™ X

## (undated) DEVICE — SYRINGE MED 10ML LUERLOCK TIP W/O SFTY DISP

## (undated) DEVICE — TRAP POLYP BALEEN

## (undated) DEVICE — TUBE SET 96 MM 64 MM H2O PERISTALTIC STD AUX CHANNEL

## (undated) DEVICE — SNARE ENDOSCP DIA9MM SHTH DIA2.4MM CLD FOR POLYP EXACTO

## (undated) DEVICE — COUNTER NDL 40 COUNT HLD 70 FOAM BLK ADH W/ MAG

## (undated) DEVICE — YANKAUER,BULB TIP,W/O VENT,RIGID,STERILE: Brand: MEDLINE

## (undated) DEVICE — FORCEPS BX L240CM JAW DIA2.4MM ORNG L CAP W/ NDL DISP RAD

## (undated) DEVICE — SINGLE PORT MANIFOLD: Brand: NEPTUNE 2

## (undated) DEVICE — GLOVE SURG SZ 65 THK91MIL LTX FREE SYN POLYISOPRENE

## (undated) DEVICE — GEL ULTRASOUND 20 GM STRL

## (undated) DEVICE — GLOVE ORANGE PI 8 1/2   MSG9085

## (undated) DEVICE — BRUSH ENDO CLN L90.5IN SHTH DIA1.7MM BRIST DIA5-7MM 2-6MM

## (undated) DEVICE — GAUZE,SPONGE,FLUFF,6"X6.75",STRL,10/TRAY: Brand: MEDLINE

## (undated) DEVICE — SUTURE VCRL SZ 3-0 L27IN ABSRB UD L26MM SH 1/2 CIR J416H

## (undated) DEVICE — TUBING IRRIGATION 140/160/180/190 SER GI ENDOSCP SMARTCAP

## (undated) DEVICE — SPONGE,LAP,18"X18",DLX,XR,ST,5/PK,40/PK: Brand: MEDLINE

## (undated) DEVICE — ELECTRODE PT RET AD L9FT HI MOIST COND ADH HYDRGEL CORDED

## (undated) DEVICE — ADAPTER FLSH PMP FLD MGMT GI IRRIG OFP 2 DISPOSABLE

## (undated) DEVICE — GOWN,AURORA,NONREINFORCED,LARGE: Brand: MEDLINE

## (undated) DEVICE — LABEL MED MINI W/ MARKER

## (undated) DEVICE — Device: Brand: ENDO SMARTCAP

## (undated) DEVICE — TUBING, SUCTION, 9/32" X 12', STRAIGHT: Brand: MEDLINE INDUSTRIES, INC.

## (undated) DEVICE — GOWN,AURORA,NONRNF,XL,30/CS: Brand: MEDLINE

## (undated) DEVICE — MARKER SURG SKIN GENTIAN VLT REG TIP W/ 6IN RUL

## (undated) DEVICE — FORCEPS BX L240CM JAW DIA2.8MM L CAP W/ NDL MIC MESH TOOTH

## (undated) DEVICE — TOWEL,OR,DSP,ST,BLUE,STD,4/PK,20PK/CS: Brand: MEDLINE

## (undated) DEVICE — GLOVE ORTHO 8   MSG9480

## (undated) DEVICE — SYRINGE IRRIG 60ML SFT PLIABLE BLB EZ TO GRP 1 HND USE W/

## (undated) DEVICE — BRA SURGICAL 3-ROW HOOK AND EYE XXLARGE

## (undated) DEVICE — SUTURE PERMAHAND SZ 3-0 L30IN NONABSORBABLE BLK SH L26MM K832H

## (undated) DEVICE — PROVE COVER: Brand: UNBRANDED

## (undated) DEVICE — SHEET,DRAPE,53X77,STERILE: Brand: MEDLINE

## (undated) DEVICE — TUBING, SUCTION, 1/4" X 10', STRAIGHT: Brand: MEDLINE

## (undated) DEVICE — SNARE ENDOSCP AD L240CM LOOP W10MM SHTH DIA2.4MM RND INSUL

## (undated) DEVICE — PACK,LAPAROTOMY,NO GOWNS: Brand: MEDLINE

## (undated) DEVICE — ENDO CARRY-ON PROCEDURE KIT: Brand: ENDO CARRY-ON PROCEDURE KIT

## (undated) DEVICE — GLOVE ORANGE PI 7   MSG9070

## (undated) DEVICE — 4-PORT MANIFOLD: Brand: NEPTUNE 2

## (undated) DEVICE — APPLICATOR  COTTON-TIPPED 6 IN WOOD STRL

## (undated) DEVICE — PAD N ADH W3XL4IN POLY COT SFT PERF FLM EASILY CUT ABSRB

## (undated) DEVICE — APPLICATOR MEDICATED 26 CC SOLUTION HI LT ORNG CHLORAPREP

## (undated) DEVICE — ADHESIVE SKIN CLSR 0.7ML TOP DERMBND ADV